# Patient Record
Sex: FEMALE | Race: WHITE | NOT HISPANIC OR LATINO | Employment: OTHER | URBAN - METROPOLITAN AREA
[De-identification: names, ages, dates, MRNs, and addresses within clinical notes are randomized per-mention and may not be internally consistent; named-entity substitution may affect disease eponyms.]

---

## 2017-01-03 ENCOUNTER — OFFICE VISIT (OUTPATIENT)
Dept: PEDIATRICS | Facility: CLINIC | Age: 67
End: 2017-01-03
Payer: COMMERCIAL

## 2017-01-03 VITALS
DIASTOLIC BLOOD PRESSURE: 74 MMHG | SYSTOLIC BLOOD PRESSURE: 118 MMHG | BODY MASS INDEX: 33.47 KG/M2 | HEIGHT: 62 IN | TEMPERATURE: 98.9 F | OXYGEN SATURATION: 97 % | HEART RATE: 65 BPM | WEIGHT: 181.9 LBS

## 2017-01-03 DIAGNOSIS — R53.83 FATIGUE, UNSPECIFIED TYPE: ICD-10-CM

## 2017-01-03 DIAGNOSIS — I10 HYPERTENSION GOAL BP (BLOOD PRESSURE) < 140/90: ICD-10-CM

## 2017-01-03 DIAGNOSIS — E04.1 THYROID NODULE: ICD-10-CM

## 2017-01-03 DIAGNOSIS — N18.30 CKD (CHRONIC KIDNEY DISEASE) STAGE 3, GFR 30-59 ML/MIN (H): ICD-10-CM

## 2017-01-03 DIAGNOSIS — F41.1 GENERALIZED ANXIETY DISORDER: ICD-10-CM

## 2017-01-03 DIAGNOSIS — R73.01 IFG (IMPAIRED FASTING GLUCOSE): ICD-10-CM

## 2017-01-03 DIAGNOSIS — E78.5 HYPERLIPIDEMIA LDL GOAL <100: ICD-10-CM

## 2017-01-03 DIAGNOSIS — E66.9 NON MORBID OBESITY, UNSPECIFIED OBESITY TYPE: ICD-10-CM

## 2017-01-03 DIAGNOSIS — Z00.00 ENCOUNTER FOR ROUTINE ADULT HEALTH EXAMINATION WITHOUT ABNORMAL FINDINGS: Primary | ICD-10-CM

## 2017-01-03 DIAGNOSIS — Z23 NEED FOR PROPHYLACTIC VACCINATION AND INOCULATION AGAINST INFLUENZA: ICD-10-CM

## 2017-01-03 PROCEDURE — 90471 IMMUNIZATION ADMIN: CPT | Performed by: PEDIATRICS

## 2017-01-03 PROCEDURE — 99396 PREV VISIT EST AGE 40-64: CPT | Mod: 25 | Performed by: PEDIATRICS

## 2017-01-03 PROCEDURE — 90686 IIV4 VACC NO PRSV 0.5 ML IM: CPT | Performed by: PEDIATRICS

## 2017-01-03 RX ORDER — PRAVASTATIN SODIUM 20 MG
20 TABLET ORAL DAILY
Qty: 90 TABLET | Refills: 3 | Status: SHIPPED
Start: 2017-01-03 | End: 2017-07-24

## 2017-01-03 RX ORDER — CITALOPRAM HYDROBROMIDE 40 MG/1
40 TABLET ORAL DAILY
Qty: 90 TABLET | Refills: 3 | Status: SHIPPED
Start: 2017-01-03 | End: 2017-07-24

## 2017-01-03 RX ORDER — CALCITRIOL 0.25 UG/1
0.25 CAPSULE, LIQUID FILLED ORAL DAILY
Qty: 90 CAPSULE | Refills: 3 | Status: SHIPPED
Start: 2017-01-03 | End: 2017-07-24

## 2017-01-03 NOTE — MR AVS SNAPSHOT
After Visit Summary   1/3/2017    Angela Story    MRN: [id number]           Patient Information     Date Of Birth          1950        Visit Information        Provider Department      1/3/2017 12:00 PM Tahira [place] MD [facility]        Today's Diagnoses     Encounter for routine adult health examination without abnormal findings    -  1     Hypertension goal BP (blood pressure) < 140/90         Generalized anxiety disorder         IFG (impaired fasting glucose)         CKD (chronic kidney disease) stage 3, GFR 30-59 ml/min         Hyperlipidemia LDL goal <100           Care Instructions    Continue current medications.     Ramp up your exercise as you can.    Plan on urine test at our next visit.    Keep an eye on fatigue and any lung symptoms - come to see me when you get home if things aren't better.  Otherwise, let's meet in June.       Flu shot today    Think about shingles shot.    Pneumonia shot at your next visit        Preventive Health Recommendations  Female Ages 50 - 64    Yearly exam: See your health care provider every year in order to  o Review health changes.   o Discuss preventive care.    o Review your medicines if your doctor has prescribed any.      Get a Pap test every three years (unless you have an abnormal result and your provider advises testing more often).    If you get Pap tests with HPV test, you only need to test every 5 years, unless you have an abnormal result.     You do not need a Pap test if your uterus was removed (hysterectomy) and you have not had cancer.    You should be tested each year for STDs (sexually transmitted diseases) if you're at risk.     Have a mammogram every 1 to 2 years.    Have a colonoscopy at age 50, or have a yearly FIT test (stool test). These exams screen for colon cancer.      Have a cholesterol test every 5 years, or more often if advised.    Have a diabetes test (fasting glucose) every three years. If you are at  risk for diabetes, you should have this test more often.     If you are at risk for osteoporosis (brittle bone disease), think about having a bone density scan (DEXA).    Shots: Get a flu shot each year. Get a tetanus shot every 10 years.    Nutrition:     Eat at least 5 servings of fruits and vegetables each day.    Eat whole-grain bread, whole-wheat pasta and brown rice instead of white grains and rice.    Talk to your provider about Calcium and Vitamin D.     Lifestyle    Exercise at least 150 minutes a week (30 minutes a day, 5 days a week). This will help you control your weight and prevent disease.    Limit alcohol to one drink per day.    No smoking.     Wear sunscreen to prevent skin cancer.     See your dentist every six months for an exam and cleaning.    See your eye doctor every 1 to 2 years.            Follow-ups after your visit        Future tests that were ordered for you today     Open Future Orders        Priority Expected Expires Ordered    Albumin Random Urine Quantitative Routine  1/3/2018 1/3/2017            Who to contact     If you have questions or need follow up information about today's clinic visit or your schedule please contact [facility] directly at [phone].  Normal or non-critical lab and imaging results will be communicated to you by NPRhart, letter or phone within 4 business days after the clinic has received the results. If you do not hear from us within 7 days, please contact the clinic through The Social Coin SLt or phone. If you have a critical or abnormal lab result, we will notify you by phone as soon as possible.  Submit refill requests through SecurActive or call your pharmacy and they will forward the refill request to us. Please allow 3 business days for your refill to be completed.          Additional Information About Your Visit        NPRharJooobz! Information     SecurActive gives you secure access to your electronic health record. If you see a primary care provider, you can  "also send messages to your care team and make appointments. If you have questions, please call your primary care clinic.  If you do not have a primary care provider, please call [phone] and they will assist you.        Care EveryWhere ID     This is your Care EveryWhere ID. This could be used by other organizations to access your [place] medical records  [id number]        Your Vitals Were     Pulse Temperature Height BMI (Body Mass Index) Pulse Oximetry Last Period    65 98.9  F (37.2  C) (Tympanic) 5' 1.61\" (1.565 m) 33.69 kg/m2 97% 07/23/2004       Blood Pressure from Last 3 Encounters:   01/03/17 118/74   12/07/16 126/80   11/21/16 150/76    Weight from Last 3 Encounters:   01/03/17 181 lb 14.4 oz (82.509 kg)   12/07/16 176 lb 2 oz (79.89 kg)   11/21/16 177 lb 4.8 oz (80.423 kg)                 Where to get your medicines      These medications [facility] sent to [facility] [place]  [address], [facility] [address]     Phone:  [phone]    - calcitRIOL 0.25 MCG capsule  - citalopram 40 MG tablet  - pravastatin 20 MG tablet       Primary Care Provider Office Phone # Fax #    Tahira [place] [phone]       [facility] [address] [place] [address]        Thank you!     Thank you for choosing [facility]  for your care. Our goal is always to provide you with excellent care. Hearing back from our patients is one way we can continue to improve our services. Please take a few minutes to complete the written survey that you may receive in the mail after your visit with us. Thank you!             Your Updated Medication List - Protect others around you: Learn how to safely use, store and throw away your medicines at www.disposemymeds.org.          This list is accurate as of: 1/3/17 12:40 PM.  Always use your most recent med list.                   Brand Name Dispense Instructions for use    Blood Pressure " Monitoring Kit     1 kit    1 kit daily       calcitRIOL 0.25 MCG capsule    ROCALTROL    90 capsule    Take 1 capsule (0.25 mcg) by mouth daily       cholecalciferol 1000 UNIT tablet    vitamin D    100 tablet    Take 1 tablet by mouth daily.       citalopram 40 MG tablet    celeXA    90 tablet    Take 1 tablet (40 mg) by mouth daily       fluticasone 50 MCG/ACT spray    FLONASE    1 Bottle    Spray 1 spray into both nostrils daily       losartan 50 MG tablet    COZAAR    90 tablet    Take 1 tablet (50 mg) by mouth daily       pravastatin 20 MG tablet    PRAVACHOL    90 tablet    Take 1 tablet (20 mg) by mouth daily

## 2017-01-03 NOTE — PATIENT INSTRUCTIONS
Continue current medications.     Ramp up your exercise as you can.    Plan on urine test at our next visit.    Keep an eye on fatigue and any lung symptoms - come to see me when you get home if things aren't better.  Otherwise, let's meet in June.       Flu shot today    Think about shingles shot.    Pneumonia shot at your next visit        Preventive Health Recommendations  Female Ages 50 - 64    Yearly exam: See your health care provider every year in order to  o Review health changes.   o Discuss preventive care.    o Review your medicines if your doctor has prescribed any.      Get a Pap test every three years (unless you have an abnormal result and your provider advises testing more often).    If you get Pap tests with HPV test, you only need to test every 5 years, unless you have an abnormal result.     You do not need a Pap test if your uterus was removed (hysterectomy) and you have not had cancer.    You should be tested each year for STDs (sexually transmitted diseases) if you're at risk.     Have a mammogram every 1 to 2 years.    Have a colonoscopy at age 50, or have a yearly FIT test (stool test). These exams screen for colon cancer.      Have a cholesterol test every 5 years, or more often if advised.    Have a diabetes test (fasting glucose) every three years. If you are at risk for diabetes, you should have this test more often.     If you are at risk for osteoporosis (brittle bone disease), think about having a bone density scan (DEXA).    Shots: Get a flu shot each year. Get a tetanus shot every 10 years.    Nutrition:     Eat at least 5 servings of fruits and vegetables each day.    Eat whole-grain bread, whole-wheat pasta and brown rice instead of white grains and rice.    Talk to your provider about Calcium and Vitamin D.     Lifestyle    Exercise at least 150 minutes a week (30 minutes a day, 5 days a week). This will help you control your weight and prevent disease.    Limit alcohol to one  drink per day.    No smoking.     Wear sunscreen to prevent skin cancer.     See your dentist every six months for an exam and cleaning.    See your eye doctor every 1 to 2 years.

## 2017-01-03 NOTE — NURSING NOTE
"Chief Complaint   Patient presents with     Physical       Initial /74 mmHg  Pulse 65  Temp(Src) 98.9  F (37.2  C) (Tympanic)  Ht 5' 1.61\" (1.565 m)  Wt 181 lb 14.4 oz (82.509 kg)  BMI 33.69 kg/m2  SpO2 97%  LMP 07/23/2004 Estimated body mass index is 33.69 kg/(m^2) as calculated from the following:    Height as of this encounter: 5' 1.61\" (1.565 m).    Weight as of this encounter: 181 lb 14.4 oz (82.509 kg).  BP completed using cuff size: large'  "

## 2017-01-03 NOTE — PROGRESS NOTES
SUBJECTIVE:     CC: Angela Story is an 66 year old woman who presents for preventive health visit.     Healthy Habits:    Do you get at least three servings of calcium containing foods daily (dairy, green leafy vegetables, etc.)? yes    Amount of exercise or daily activities, outside of work: none currently, recent foot surgery     Problems taking medications regularly No    Medication side effects: No    Have you had an eye exam in the past two years? yes    Do you see a dentist twice per year? Once a year   Do you have sleep apnea, excessive snoring or daytime drowsiness?no      - fatigue   - recent chest xray, review results    Today's PHQ-2 Score:   PHQ-2 ( 1999 Pfizer) 1/3/2017 12/31/2015   Q1: Little interest or pleasure in doing things 0 0   Q2: Feeling down, depressed or hopeless 0 0   PHQ-2 Score 0 0       Abuse: Current or Past(Physical, Sexual or Emotional)- No  Do you feel safe in your environment - Yes    Social History   Substance Use Topics     Smoking status: Never Smoker      Smokeless tobacco: Never Used     Alcohol Use: Yes      Comment: 1 per week     The patient does not drink >3 drinks per day nor >7 drinks per week.    Recent Labs   Lab Test  09/22/16   0952  12/31/15   0757  12/24/14   0717  04/08/14   0741   CHOL  152  211*  182  190   HDL  61  70  74  66   LDL  76  117*  83  98   TRIG  73  118  125  127   CHOLHDLRATIO   --    --   2.5  2.9   NHDL  91  141*   --    --        Reviewed orders with patient.  Reviewed health maintenance and updated orders accordingly - Yes    Mammo Decision Support:  Patient over age 50, mutual decision to screen reflected in health maintenance.    Pertinent mammograms are reviewed under the imaging tab.  History of abnormal Pap smear: NO - age 30-65 PAP every 5 years with negative HPV co-testing recommended  All Histories reviewed and updated in Epic.    HPI:    Recent right foot surgery - healing well - starting to be able walk and exercise more.  Plans to  "restart her exercise classes in near future.    Back to [place] on Sunday - plans to stay for 3 months - has exercise classes arranged.    Fatigue - currently living with friend who has widely metastatic cancer - emotionally exhausted.  Started to feel more tired after her foot surgery.  Also had a lung infection - reviewed Chest X ray results today - suggests emphesematous changes and hyperinflation.  Patient doesn't think she has every had issues with recurrent bronchitis or wheezing.  Has never been a smoker, but was exposed to a lot of second hand smoke as a child.   No current issues with cough.    Reviewed recent labs - has been doing well.   Medications stable.    HTN - well controlled on current medications without side effects.  No cardiac symptoms.    Anxiety - well controlled on SSRI without side effects.    HL - doing well on statin, no side effects.      ROS:  C: NEGATIVE for fever, chills, change in weight  I: NEGATIVE for worrisome rashes, moles or lesions  E: NEGATIVE for vision changes or irritation  ENT: NEGATIVE for ear, mouth and throat problems  R: NEGATIVE for significant cough or SOB  B: NEGATIVE for masses, tenderness or discharge  CV: NEGATIVE for chest pain, palpitations or peripheral edema  GI: NEGATIVE for nausea, abdominal pain, heartburn, or change in bowel habits  : NEGATIVE for unusual urinary or vaginal symptoms. No vaginal bleeding.  M: NEGATIVE for significant arthralgias or myalgia  N: NEGATIVE for weakness, dizziness or paresthesias  P: NEGATIVE for changes in mood or affect     Problem list, Medication list, Allergies, and Medical/Social/Surgical histories reviewed in [facility] and updated as appropriate.  OBJECTIVE:     /74 mmHg  Pulse 65  Temp(Src) 98.9  F (37.2  C) (Tympanic)  Ht 5' 1.61\" (1.565 m)  Wt 181 lb 14.4 oz (82.509 kg)  BMI 33.69 kg/m2  SpO2 97%  LMP 07/23/2004  EXAM:  GENERAL: healthy, alert and no distress  EYES: Eyes grossly normal to inspection, PERRL and " conjunctivae and sclerae normal  HENT: ear canals and TM's normal, nose and mouth without ulcers or lesions  NECK: no adenopathy, no asymmetry, masses, or scars and thyroid normal to palpation  RESP: lungs clear to auscultation - no rales, rhonchi or wheezes  CV: regular rate and rhythm, normal S1 S2, no S3 or S4, no murmur, click or rub, no peripheral edema and peripheral pulses strong  ABDOMEN: soft, nontender, no hepatosplenomegaly, no masses and bowel sounds normal  MS: no gross musculoskeletal defects noted, no edema  SKIN: no suspicious lesions or rashes  NEURO: Normal strength and tone, mentation intact and speech normal  PSYCH: mentation appears normal, affect normal/bright    ASSESSMENT/PLAN:         ICD-10-CM    1. Encounter for routine adult health examination without abnormal findings Z00.00 Reviewed recent lab work, plan on PTH, vitamin D with next lab draw   2. Hypertension goal BP (blood pressure) < 140/90 I10 Albumin Random Urine Quantitative  Well controlled, continue current medications     3. Generalized anxiety disorder F41.1 citalopram (CELEXA) 40 MG tablet  Well controlled, continue current medications     4. IFG (impaired fasting glucose) R73.01 Continue to follow over time    5. CKD (chronic kidney disease) stage 3, GFR 30-59 ml/min N18.3 calcitRIOL (ROCALTROL) 0.25 MCG capsule   6. Non morbid obesity, unspecified obesity type E66.9 Discussed lifestyle change   7. Fatigue, unspecified type R53.83 Likely multifactorial - caring for friend with advanced cancer, recent lung infection (now recovered), and recent surgery.  No sleep or mood concerns.  Plan [place] and she will MyChart message me or return if symptoms not improving over next few months.   8. Hyperlipidemia LDL goal <100 E78.5 pravastatin (PRAVACHOL) 20 MG tablet  Well controlled, continue current medications     9. Thyroid nodule E04.1 Recently imaged - tiny cyst noted   10. Need for  "prophylactic vaccination and inoculation against influenza Z23 FLU VAC, SPLIT VIRUS IM > 3 YO (QUADRIVALENT) [97946]     Vaccine Administration, Initial [83971]       COUNSELING:   Special attention given to:        Regular exercise       Healthy diet/nutrition       Vision screening       Vaccinated for: Influenza       Osteoporosis Prevention/Bone Health         reports that she has never smoked. She has never used smokeless tobacco.    Estimated body mass index is 33.69 kg/(m^2) as calculated from the following:    Height as of this encounter: 5' 1.61\" (1.565 m).    Weight as of this encounter: 181 lb 14.4 oz (82.509 kg).   Weight management plan: Discussed healthy diet and exercise guidelines and patient will follow up in 6 months in clinic to re-evaluate.    Counseling Resources:  ATP IV Guidelines  Pooled Cohorts Equation Calculator  Breast Cancer Risk Calculator  FRAX Risk Assessment  ICSI Preventive Guidelines  Dietary Guidelines for Americans, 2010  USDA's MyPlate  ASA Prophylaxis  Lung CA Screening    Tahira [place] MD  [facility]  "

## 2017-02-07 DIAGNOSIS — I10 HYPERTENSION GOAL BP (BLOOD PRESSURE) < 140/90: Primary | ICD-10-CM

## 2017-02-07 NOTE — TELEPHONE ENCOUNTER
Cozaar 50mg  Last Written Prescription Date: 12/9/16  Last Fill Quantity: 90, # refills: 3  Last Office Visit with [facility], [facility] or [facility] prescribing provider: 1/3/16       POTASSIUM   Date Value Ref Range Status   09/22/2016 4.2 3.4 - 5.3 mmol/L Final     CREATININE   Date Value Ref Range Status   09/22/2016 0.95 0.52 - 1.04 mg/dL Final     BP Readings from Last 3 Encounters:   01/03/17 118/74   12/07/16 126/80   11/21/16 150/76     *Patient is in [place] for a few months and forgot her medication at home. Pharmacy is requesting that a new script be sent to them to cover her vacation fill. Patient would like enough for 6 weeks.   Demetra Kiser,   [facility]

## 2017-02-10 RX ORDER — LOSARTAN POTASSIUM 50 MG/1
50 TABLET ORAL DAILY
Qty: 60 TABLET | Refills: 0 | Status: CANCELLED | OUTPATIENT
Start: 2017-02-10

## 2017-02-10 NOTE — TELEPHONE ENCOUNTER
Left voicemail for patient to call back the clinic. [facility], does she want mail order or a local pharmacy nearby? Will need to await response before sending.

## 2017-02-10 NOTE — TELEPHONE ENCOUNTER
Patient calling back, she called [facility], they figured it out and sent her the medication already. No further action needed.

## 2017-06-19 ENCOUNTER — MYC MEDICAL ADVICE (OUTPATIENT)
Dept: PEDIATRICS | Facility: CLINIC | Age: 67
End: 2017-06-19

## 2017-07-24 ENCOUNTER — OFFICE VISIT (OUTPATIENT)
Dept: PEDIATRICS | Facility: CLINIC | Age: 67
End: 2017-07-24
Payer: COMMERCIAL

## 2017-07-24 VITALS
BODY MASS INDEX: 33.68 KG/M2 | WEIGHT: 183 LBS | DIASTOLIC BLOOD PRESSURE: 78 MMHG | OXYGEN SATURATION: 97 % | SYSTOLIC BLOOD PRESSURE: 126 MMHG | HEIGHT: 62 IN | HEART RATE: 70 BPM | TEMPERATURE: 98.8 F

## 2017-07-24 DIAGNOSIS — N18.30 CKD (CHRONIC KIDNEY DISEASE) STAGE 3, GFR 30-59 ML/MIN (H): ICD-10-CM

## 2017-07-24 DIAGNOSIS — Z23 NEED FOR TETANUS BOOSTER: ICD-10-CM

## 2017-07-24 DIAGNOSIS — F41.1 GENERALIZED ANXIETY DISORDER: ICD-10-CM

## 2017-07-24 DIAGNOSIS — Z23 NEED FOR PROPHYLACTIC VACCINATION AGAINST STREPTOCOCCUS PNEUMONIAE (PNEUMOCOCCUS): ICD-10-CM

## 2017-07-24 DIAGNOSIS — E78.5 HYPERLIPIDEMIA LDL GOAL <100: ICD-10-CM

## 2017-07-24 DIAGNOSIS — I10 HYPERTENSION GOAL BP (BLOOD PRESSURE) < 140/90: Primary | ICD-10-CM

## 2017-07-24 LAB
ALBUMIN SERPL-MCNC: 4.1 G/DL (ref 3.4–5)
ALP SERPL-CCNC: 73 U/L (ref 40–150)
ALT SERPL W P-5'-P-CCNC: 25 U/L (ref 0–50)
ANION GAP SERPL CALCULATED.3IONS-SCNC: 4 MMOL/L (ref 3–14)
AST SERPL W P-5'-P-CCNC: 22 U/L (ref 0–45)
BILIRUB SERPL-MCNC: 0.7 MG/DL (ref 0.2–1.3)
BUN SERPL-MCNC: 13 MG/DL (ref 7–30)
CALCIUM SERPL-MCNC: 9.2 MG/DL (ref 8.5–10.1)
CHLORIDE SERPL-SCNC: 107 MMOL/L (ref 94–109)
CO2 SERPL-SCNC: 28 MMOL/L (ref 20–32)
CREAT SERPL-MCNC: 1.04 MG/DL (ref 0.52–1.04)
CREAT UR-MCNC: 28 MG/DL
GFR SERPL CREATININE-BSD FRML MDRD: 53 ML/MIN/1.7M2
GLUCOSE SERPL-MCNC: 108 MG/DL (ref 70–99)
MICROALBUMIN UR-MCNC: <5 MG/L
MICROALBUMIN/CREAT UR: NORMAL MG/G CR (ref 0–25)
POTASSIUM SERPL-SCNC: 4.6 MMOL/L (ref 3.4–5.3)
PROT SERPL-MCNC: 7.4 G/DL (ref 6.8–8.8)
SODIUM SERPL-SCNC: 139 MMOL/L (ref 133–144)

## 2017-07-24 PROCEDURE — 80053 COMPREHEN METABOLIC PANEL: CPT | Performed by: PEDIATRICS

## 2017-07-24 PROCEDURE — G0009 ADMIN PNEUMOCOCCAL VACCINE: HCPCS | Mod: 59 | Performed by: PEDIATRICS

## 2017-07-24 PROCEDURE — 90471 IMMUNIZATION ADMIN: CPT | Performed by: PEDIATRICS

## 2017-07-24 PROCEDURE — 90670 PCV13 VACCINE IM: CPT | Performed by: PEDIATRICS

## 2017-07-24 PROCEDURE — 99214 OFFICE O/P EST MOD 30 MIN: CPT | Mod: 25 | Performed by: PEDIATRICS

## 2017-07-24 PROCEDURE — 36415 COLL VENOUS BLD VENIPUNCTURE: CPT | Performed by: PEDIATRICS

## 2017-07-24 PROCEDURE — 90715 TDAP VACCINE 7 YRS/> IM: CPT | Performed by: PEDIATRICS

## 2017-07-24 PROCEDURE — 82043 UR ALBUMIN QUANTITATIVE: CPT | Performed by: PEDIATRICS

## 2017-07-24 RX ORDER — PRAVASTATIN SODIUM 20 MG
20 TABLET ORAL DAILY
Qty: 90 TABLET | Refills: 3 | Status: SHIPPED | OUTPATIENT
Start: 2017-07-24 | End: 2018-03-23

## 2017-07-24 RX ORDER — CITALOPRAM HYDROBROMIDE 40 MG/1
40 TABLET ORAL DAILY
Qty: 90 TABLET | Refills: 3 | Status: SHIPPED | OUTPATIENT
Start: 2017-07-24 | End: 2018-03-23

## 2017-07-24 RX ORDER — CALCITRIOL 0.25 UG/1
0.25 CAPSULE, LIQUID FILLED ORAL DAILY
Qty: 90 CAPSULE | Refills: 3 | Status: SHIPPED | OUTPATIENT
Start: 2017-07-24 | End: 2018-03-23

## 2017-07-24 RX ORDER — LOSARTAN POTASSIUM 50 MG/1
50 TABLET ORAL DAILY
Qty: 90 TABLET | Refills: 3 | Status: SHIPPED | OUTPATIENT
Start: 2017-07-24 | End: 2018-03-23

## 2017-07-24 NOTE — NURSING NOTE
"Chief Complaint   Patient presents with     Anxiety     Hypertension     Lipids     Recheck Medication       Initial /78 (BP Location: Right arm, Patient Position: Right side, Cuff Size: Adult Regular)  Pulse 70  Temp 98.8  F (37.1  C) (Tympanic)  Ht 5' 1.6\" (1.565 m)  Wt 183 lb (83 kg)  LMP 07/23/2004  SpO2 97%  BMI 33.91 kg/m2 Estimated body mass index is 33.91 kg/(m^2) as calculated from the following:    Height as of this encounter: 5' 1.6\" (1.565 m).    Weight as of this encounter: 183 lb (83 kg).  Medication Reconciliation: complete  "

## 2017-07-24 NOTE — MR AVS SNAPSHOT
After Visit Summary   7/24/2017    Angela Story    MRN: [id number]           Patient Information     Date Of Birth          1950        Visit Information        Provider Department      7/24/2017 10:20 AM Tahira [place] MD [facility]        Today's Diagnoses     Hypertension goal BP (blood pressure) < 140/90    -  1    Need for prophylactic vaccination against Streptococcus pneumoniae (pneumococcus)        CKD (chronic kidney disease) stage 3, GFR 30-59 ml/min        Generalized anxiety disorder        Hyperlipidemia LDL goal <100        Need for tetanus booster          Care Instructions    Tetanus (with whooping cough booster) and pneumonia vaccines today    Labs today on the first floor    Medication refills sent to [facility]    Keep up your great exercise!          Follow-ups after your visit        Who to contact     If you have questions or need follow up information about today's clinic visit or your schedule please contact [facility] directly at [phone].  Normal or non-critical lab and imaging results will be communicated to you by MyChart, letter or phone within 4 business days after the clinic has received the results. If you do not hear from us within 7 days, please contact the clinic through Streaming Erahart or phone. If you have a critical or abnormal lab result, we will notify you by phone as soon as possible.  Submit refill requests through AchieveIt Online or call your pharmacy and they will forward the refill request to us. Please allow 3 business days for your refill to be completed.          Additional Information About Your Visit        MyChart Information     AchieveIt Online gives you secure access to your electronic health record. If you see a primary care provider, you can also send messages to your care team and make appointments. If you have questions, please call your primary care clinic.  If you do not have a primary care provider, please call  "[phone] and they will assist you.        Care EveryWhere ID     This is your Care EveryWhere ID. This could be used by other organizations to access your [place] medical records  [id number]        Your Vitals Were     Pulse Temperature Height Last Period Pulse Oximetry BMI (Body Mass Index)    70 98.8  F (37.1  C) (Tympanic) 5' 1.6\" (1.565 m) 07/23/2004 97% 33.91 kg/m2       Blood Pressure from Last 3 Encounters:   07/24/17 126/78   01/03/17 118/74   12/07/16 126/80    Weight from Last 3 Encounters:   07/24/17 183 lb (83 kg)   01/03/17 181 lb 14.4 oz (82.5 kg)   12/07/16 176 lb 2 oz (79.9 kg)              We Performed the Following     ADMIN: Vaccine, Initial (62879)     Albumin Random Urine Quantitative     Comprehensive metabolic panel     Pneumococcal vaccine 13 valent PCV13 IM (Prevnar) [58284]     TDAP VACCINE (ADACEL)     VACCINE ADMINISTRATION, EACH ADDITIONAL          Where to get your medicines      These medications were sent to [facility] - [place], [place] - [address] AT [facility]  [address], [facility] [address]     Phone:  [phone]     calcitRIOL 0.25 MCG capsule    citalopram 40 MG tablet    losartan 50 MG tablet    pravastatin 20 MG tablet          Primary Care Provider Office Phone # Fax #    Tahira [place] [phone]       [facility] [address] [place] [address] [address] [place] [address]        Equal Access to Services     [facility]: Hadii johnson minor Sojerry, waaxda luqadaha, qaybta kaalmayandy dos santos . So [facility] [phone].    ATENCIÓN: Si habla español, tiene a stevens disposición servicios gratuitos de asistencia lingüística. Llame al [phone].    We comply with applicable federal civil rights laws and [place] laws. We do not discriminate on the basis of race, color, national origin, age, disability sex, sexual orientation or gender identity.            Thank you!  "    Thank you for choosing [facility]  for your care. Our goal is always to provide you with excellent care. Hearing back from our patients is one way we can continue to improve our services. Please take a few minutes to complete the written survey that you may receive in the mail after your visit with us. Thank you!             Your Updated Medication List - Protect others around you: Learn how to safely use, store and throw away your medicines at www.disposemymeds.org.          This list is accurate as of: 7/24/17 10:57 AM.  Always use your most recent med list.                   Brand Name Dispense Instructions for use Diagnosis    Blood Pressure Monitoring Kit     1 kit    1 kit daily    Hypertension goal BP (blood pressure) < 140/90       calcitRIOL 0.25 MCG capsule    ROCALTROL    90 capsule    Take 1 capsule (0.25 mcg) by mouth daily    CKD (chronic kidney disease) stage 3, GFR 30-59 ml/min       cholecalciferol 1000 UNIT tablet    vitamin D    100 tablet    Take 1 tablet by mouth daily.    Routine general medical examination at a health care facility       citalopram 40 MG tablet    celeXA    90 tablet    Take 1 tablet (40 mg) by mouth daily    Generalized anxiety disorder       fluticasone 50 MCG/ACT spray    FLONASE    1 Bottle    Spray 1 spray into both nostrils daily    Cough       losartan 50 MG tablet    COZAAR    90 tablet    Take 1 tablet (50 mg) by mouth daily    Hypertension goal BP (blood pressure) < 140/90       pravastatin 20 MG tablet    PRAVACHOL    90 tablet    Take 1 tablet (20 mg) by mouth daily    Hyperlipidemia LDL goal <100

## 2017-07-24 NOTE — PROGRESS NOTES
SUBJECTIVE:                                                    Angela Story is a 67 year old female who presents to clinic today for the following health issues:      Hyperlipidemia Follow-Up      Rate your low fat/cholesterol diet?: fair    Taking statin?  Yes, no muscle aches from statin    Other lipid medications/supplements?:  none    Hypertension Follow-up      Outpatient blood pressures are being checked at home.  Results are 130/80's.    Low Salt Diet: low salt    Anxiety Follow-Up    Status since last visit: stable    Other associated symptoms:None    Complicating factors:   Significant life event: No   Current substance abuse: None  Depression symptoms: No  CICI-7 SCORE 6/23/2014 6/26/2014 7/7/2014   Total Score 12 11 12       GAD7          [place].  Grandson recently born - now 5 weeks old.    Has increased exercising.  Weight stable.    Fatigue from last year has resolved - believes it was related to a combination of life events.    Anxiety - well controlled.  No side effects on medications.  Exercise helps with mood too.    Foot feeling good after surgery last year.  Ambulation not limited.    Due for urine protein check for CKD.    No new cardiac symptoms - denies chest pain, palpitations, LE edema, breathing changes.          Amount of exercise or physical activity: 5-6 days for 1-2 hours daily     Problems taking medications regularly: No    Medication side effects: none  Diet: low salt and low fat/cholesterol      Problem list and histories reviewed & adjusted, as indicated.  Additional history: as documented      Reviewed and updated as needed this visit by clinical staff     Reviewed and updated as needed this visit by Provider         ROS:  Constitutional, psych, cardiovascular, pulmonary, derm, msk systems are negative, except as otherwise noted.      OBJECTIVE:   /78 (BP Location: Right arm, Patient Position: Right side, Cuff Size:  "Adult Regular)  Pulse 70  Temp 98.8  F (37.1  C) (Tympanic)  Ht 5' 1.6\" (1.565 m)  Wt 183 lb (83 kg)  LMP 07/23/2004  SpO2 97%  BMI 33.91 kg/m2  Body mass index is 33.91 kg/(m^2).  GENERAL: healthy, alert and no distress  RESP: lungs clear to auscultation - no rales, rhonchi or wheezes  CV: regular rate and rhythm, normal S1 S2, no S3 or S4, no murmur, click or rub, no peripheral edema and peripheral pulses strong  PSYCH: mentation appears normal, affect normal/bright    Diagnostic Test Results:  pending    ASSESSMENT/PLAN:       ICD-10-CM    1. Hypertension goal BP (blood pressure) < 140/90 I10 Albumin Random Urine Quantitative     losartan (COZAAR) 50 MG tablet     Comprehensive metabolic panel  Well controlled, continue current medications.  Encouraged in lifestyle changes - dietary changes and exercise.     2. CKD (chronic kidney disease) stage 3, GFR 30-59 ml/min N18.3 calcitRIOL (ROCALTROL) 0.25 MCG capsule  Recheck lab work today, including microalbumin   3. Generalized anxiety disorder F41.1 citalopram (CELEXA) 40 MG tablet  Well controlled, continue current medications     4. Hyperlipidemia LDL goal <100 E78.5 pravastatin (PRAVACHOL) 20 MG tablet  Well controlled, continue current medications     5. Need for tetanus booster Z23 TDAP VACCINE (ADACEL)     VACCINE ADMINISTRATION, EACH ADDITIONAL   6. Need for prophylactic vaccination against Streptococcus pneumoniae (pneumococcus) Z23 Pneumococcal vaccine 13 valent PCV13 IM (Prevnar) [33603]     ADMIN: Vaccine, Initial (57731)       Follow up for physical in January.    Tahira Wallis MD  [facility]    "

## 2017-07-24 NOTE — NURSING NOTE
Screening Questionnaire for Adult Immunization    Are you sick today?   No   Do you have allergies to medications, food, a vaccine component or latex?   Yes   Have you ever had a serious reaction after receiving a vaccination?   No   Do you have a long-term health problem with heart disease, lung disease, asthma, kidney disease, metabolic disease (e.g. diabetes), anemia, or other blood disorder?   No   Do you have cancer, leukemia, HIV/AIDS, or any other immune system problem?   No   In the past 3 months, have you taken medications that affect  your immune system, such as prednisone, other steroids, or anticancer drugs; drugs for the treatment of rheumatoid arthritis, Crohn s disease, or psoriasis; or have you had radiation treatments?   No   Have you had a seizure, or a brain or other nervous system problem?   No   During the past year, have you received a transfusion of blood or blood     products, or been given immune (gamma) globulin or antiviral drug?   No   For women: Are you pregnant or is there a chance you could become        pregnant during the next month?   No   Have you received any vaccinations in the past 4 weeks?   No     Immunization questionnaire was positive for at least one answer.  Notified provider.      MNVFC doesn't apply on this patient    Per orders of Dr. Wallis, injection of PCV13 and TDAP given by Danae Rodgers. Patient instructed to remain in clinic for 15 minutes afterwards, and to report any adverse reaction to me immediately.       Screening performed by Danae Rodgers on 7/24/2017 at 11:14 AM.

## 2017-07-24 NOTE — PATIENT INSTRUCTIONS
Tetanus (with whooping cough booster) and pneumonia vaccines today    Labs today on the first floor    [facility]    Keep up your great exercise!

## 2017-10-04 ENCOUNTER — OFFICE VISIT (OUTPATIENT)
Dept: PEDIATRICS | Facility: CLINIC | Age: 67
End: 2017-10-04
Payer: COMMERCIAL

## 2017-10-04 VITALS
DIASTOLIC BLOOD PRESSURE: 84 MMHG | OXYGEN SATURATION: 97 % | TEMPERATURE: 98.2 F | SYSTOLIC BLOOD PRESSURE: 136 MMHG | HEART RATE: 74 BPM | WEIGHT: 190 LBS | HEIGHT: 62 IN | BODY MASS INDEX: 34.96 KG/M2

## 2017-10-04 DIAGNOSIS — Z23 NEED FOR PROPHYLACTIC VACCINATION AND INOCULATION AGAINST INFLUENZA: ICD-10-CM

## 2017-10-04 DIAGNOSIS — R05.9 COUGH: Primary | ICD-10-CM

## 2017-10-04 PROCEDURE — 90662 IIV NO PRSV INCREASED AG IM: CPT | Performed by: INTERNAL MEDICINE

## 2017-10-04 PROCEDURE — 90471 IMMUNIZATION ADMIN: CPT | Performed by: INTERNAL MEDICINE

## 2017-10-04 PROCEDURE — 99213 OFFICE O/P EST LOW 20 MIN: CPT | Mod: GC | Performed by: INTERNAL MEDICINE

## 2017-10-04 RX ORDER — FLUTICASONE PROPIONATE 50 MCG
1-2 SPRAY, SUSPENSION (ML) NASAL DAILY
Qty: 1 BOTTLE | Refills: 1 | Status: SHIPPED | OUTPATIENT
Start: 2017-10-04 | End: 2019-01-21

## 2017-10-04 NOTE — PROGRESS NOTES
Injectable Influenza Immunization Documentation    1.  Is the person to be vaccinated sick today?   No    2. Does the person to be vaccinated have an allergy to a component   of the vaccine?   No - but sensitivity to eggs    3. Has the person to be vaccinated ever had a serious reaction   to influenza vaccine in the past?   No    4. Has the person to be vaccinated ever had Guillain-Barré syndrome?   No    Form completed by pt/Neda Gloria MA 10/4/2017 1:57 PM

## 2017-10-04 NOTE — PATIENT INSTRUCTIONS
- start flonase 2 sprays in each nostril daily for 7 days  - if your symptoms are better in 7 days, change to 1 spray in each nostril for 23 more days (1 full month)  - take 20 mg of omeprazole daily for 2 months  - see Dr. Wallis if your symptoms are not better in 2 weeks  - come back sooner if your symptoms acutely worsen (new chest pain, difficulty breathing, new fevers...)  Cough, Chronic, Uncertain Cause (Adult)    Everyone has had a cough as part of the common cold, flu, or bronchitis. This kind of cough occurs along with an achy feeling, low-grade fever, nasal and sinus congestion, and a scratchy or sore throat. This usually gets better in 2 to 3 weeks. A cough that lasts longer than 3 weeks may be due to other causes.  If your cough does not improve over the next 2 weeks, further testing may be needed. Follow up with your healthcare provider as advised. Cough suppressants may be recommended. Based on your exam today, the exact cause of your cough is not certain. Below are some common causes for persistent cough.  Smokers cough  Smoker s cough doesn t go away. If you continue to smoke, it only gets worse. The cough is from irritation in the air passages. Talk to your healthcare provider about quitting. Medicines or nicotine-replacement products, like gum or the patch, may make quitting easier.  Postnasal drip  A cough that is worse at night may be due to postnasal drip. Excess mucus in the nose drains from the back of your nose to your throat. This triggers the cough reflex. Postnasal drip may be due to a sinus infection or allergy. Common allergens include dust, tobacco smoke (both inhaled and secondhand smoke), environmental pollutants, pollen, mold, pets, cleaning agents, room deodorizers, and chemical fumes. Over-the-counter antihistamines or decongestants may be helpful for allergies. A sinus infection may requires antibiotic treatment. See your healthcare provider if symptoms  continue.  Medicines  Certain prescribed medicines can cause a chronic cough in some people:    ACE inhibitors for high blood pressure. These include benazepril, captopril, enalapril, fosinopril, lisinopril, quinapril, ramipril, and others.    Beta-blockers for high blood pressure and other conditions. These include propranolol, atenolol, metoprolol, nadolol, and others.  Let your healthcare provider know if you are taking any of these.  Asthma  Cough may be the only sign of mild asthma. You may have tests to find out if asthma is causing your cough. You may also take asthma medicine on a trial basis.  Acid reflux (heartburn, GERD)  The esophagus is the tube that carries food from the mouth to the stomach. A valve at its lower end prevents stomach acids from flowing upward. If this valve does not work properly, acid from the stomach enters the esophagus. This may cause a burning pain in the upper abdomen or lower chest, belching, or cough. Symptoms are often worse when lying flat. Avoid eating or drinking before bedtime. Try using extra pillows to raise your upper body, or place 4-inch blocks under the head of your bed. You may try an over-the-counter antacid or an acid-blocking medicine such as famotidine, cimetidine, ranitidine, esomeprazole, lansoprazole, or omeprazole. Stronger medicines for this condition can be prescribed by your healthcare provider.  Follow-up care  Follow up with your healthcare provider, or as advised, if your cough does not improve. Further testing may be needed.  Note: If an X-ray was taken, a specialist will review it. You will be notified of any new findings that may affect your care.  When to seek medical advice  Call your healthcare provider right away if any of these occur:    Mild wheezing or difficulty breathing    Fever of 100.4 F (38 C) or higher, or as directed by your healthcare provider    Unexpected weight loss    Coughing up large amounts of colored sputum    Night sweats  (sheets and pajamas get soaking wet)  Call 911, or get immediate medical care  Contact emergency services right away if any of these occur:    Coughing up blood    Moderate to severe trouble breathing or wheezing  Date Last Reviewed: 9/13/2015 2000-2017 [employer]. [address] [place] [address], [place] [address]. All rights reserved. This information is not intended as a substitute for professional medical care. Always follow your healthcare professional's instructions.

## 2017-10-04 NOTE — PROGRESS NOTES
"  SUBJECTIVE:   Angela Story is a 67 year old female who presents to clinic today for the following health issues:    RESPIRATORY SYMPTOMS    Patient presents for evaluation of cough. She has had a cough productive of green then clear sputum, mostly early AM, for the last 2-3 weeks. Phlegm is better throughout the day, mostly gone by afternoon. She denies worsening cough at night, but does wake \"around 0400\" sometimes with a cough. She denies cold like symptoms before this and even currently. She occasionally has a sensation of needing to clear her throat and possible \"throat tightening\" without any swallowing difficulty or respiratory distress associated with these events. She had one episode of dyspnea with exertion recently, but attributes this to \"walking with a friend who walks fast\" after \"about 3/4 of a mile.\" She denies dyspnea at rest or with ADLs or when \"pushing a shopping cart around.\" She thinks she had a wheeze the other day, but denies this currently. Recent travel includes time split between here and [facility] She returned from [place] recently, but had symptoms while in [place]. No known sick contacts. No known allergies, but + post nasal drip. Had some GERD like symptoms recently. Taking prilosec the last few days with some improvement overall in her symptoms. Symptoms are not worsening. She denies fevers, chills, itchy/watery eyes, otalgia, rhinorrhea, congestions, tooth pain sore throat, chest pain, abd pain, N/V/D, changes in urination, constipation, new rashes, edema. She is a non smoker. No hx of asthma.    Problem list and histories reviewed & adjusted, as indicated.  Additional history: as documented    Patient Active Problem List   Diagnosis     HYPERLIPIDEMIA LDL GOAL <100     CKD (chronic kidney disease) stage 3, GFR 30-59 ml/min     Advanced directives, counseling/discussion     Hypertension goal BP (blood pressure) < 140/90     Esophageal reflux     Right shoulder pain     Osteoarthritis of " acromioclavicular joint     Bilateral bunions     Generalized anxiety disorder     Thyroid nodule     IFG (impaired fasting glucose)     Non morbid obesity, unspecified obesity type     Past Surgical History:   Procedure Laterality Date     BUNIONECTOMY  2014    Procedure: BUNIONECTOMY;  Surgeon: Kevin Rodas DPM;  Location: [facility] OR     C  DELIVERY ONLY      , Low Cervical     C NONSPECIFIC PROCEDURE      lumpectomy, lt     C NONSPECIFIC PROCEDURE      ob      C NONSPECIFIC PROCEDURE      Vaginal delivery x 1     EXCISE MASS BACK  2014    Procedure: EXCISE MASS BACK;  Surgeon: Chaim Lacy MD;  Location: [facility] OR     HC ARTHROTOMY W/OPEN MENISCUS REPAIR      lt knee     HC DILATION/CURETTAGE DIAG/THER NON OB      D & C     TONSILLECTOMY      tonsillectomy       Social History   Substance Use Topics     Smoking status: Never Smoker     Smokeless tobacco: Never Used     Alcohol use Yes      Comment: 1 per week     Family History   Problem Relation Age of Onset     Lipids Mother      Hypertension Mother      CANCER Father      pancreatic     Alcohol/Drug Father      etoh     HEART DISEASE Maternal Grandfather      CEREBROVASCULAR DISEASE Paternal Grandfather      Psychotic Disorder Sister      bi polar     Neurologic Disorder Sister      ms     Neurologic Disorder Sister      cidp         Current Outpatient Prescriptions   Medication Sig Dispense Refill     OMEPRAZOLE PO Take 20 mg by mouth every morning       citalopram (CELEXA) 40 MG tablet Take 1 tablet (40 mg) by mouth daily 90 tablet 3     pravastatin (PRAVACHOL) 20 MG tablet Take 1 tablet (20 mg) by mouth daily 90 tablet 3     calcitRIOL (ROCALTROL) 0.25 MCG capsule Take 1 capsule (0.25 mcg) by mouth daily 90 capsule 3     losartan (COZAAR) 50 MG tablet Take 1 tablet (50 mg) by mouth daily 90 tablet 3     fluticasone (FLONASE) 50 MCG/ACT spray Spray 1 spray into both nostrils daily 1 Bottle 11      "cholecalciferol (VITAMIN D) 1000 UNIT tablet Take 1 tablet by mouth daily. 100 tablet 12     Blood Pressure Monitoring KIT 1 kit daily 1 kit 0     Allergies   Allergen Reactions     Norco [Hydrocodone-Acetaminophen] Nausea and Vomiting     Dizzy     Eggs Nausea and Vomiting and Diarrhea     Lisinopril Cough     Mucinex Hives     Penicillins Rash     Sulfa Drugs Hives     Sulfites Nausea and Vomiting and Diarrhea     BP Readings from Last 3 Encounters:   10/04/17 136/84   07/24/17 126/78   01/03/17 118/74    Wt Readings from Last 3 Encounters:   10/04/17 190 lb (86.2 kg)   07/24/17 183 lb (83 kg)   01/03/17 181 lb 14.4 oz (82.5 kg)         Labs reviewed in EPIC    ROS:  10 point ROS completed and were negative aside from the pertinent findings noted in the HPI.    OBJECTIVE:     /84 (BP Location: Right arm, Cuff Size: Adult Large)  Pulse 74  Temp 98.2  F (36.8  C) (Oral)  Ht 5' 1.5\" (1.562 m)  Wt 190 lb (86.2 kg)  LMP 07/23/2004  SpO2 97%  BMI 35.32 kg/m2  Body mass index is 35.32 kg/(m^2).  GENERAL: healthy, alert and no distress  EYES: Eyes grossly normal to inspection, PERRL and conjunctivae and sclerae normal  HENT: scant clear fluid behind the TM on the left without any bulging or erythema, right TM normal, mild OP erythema, tonsils normal, no obvious cobblestoning  NECK: supple, thyroid normal to palpation, no adenopathy  RESP: lungs clear to auscultation - no rales, rhonchi or wheezes  CV: regular rate and rhythm, normal S1 S2, no S3 or S4, no murmur, click or rub, no peripheral edema and peripheral pulses strong  ABDOMEN: bowel sounds normal  MS: no gross musculoskeletal defects noted  NEURO: Normal strength and tone, mentation intact and speech normal  PSYCH: mentation appears normal, affect normal/bright    ASSESSMENT/PLAN:     (R05) Cough  (primary encounter diagnosis)  Differential includes post nasal drip vs allergies vs GERD vs infectious vs less likely asthma. No fevers, hypoxia or focal " lung findings to suggest PNA. Symptoms are better, including productive cough, as day progresses, thus less likely bronchitis. There was also no preceding URI like symptoms. No sinusitis symptoms. No edema or lung findings to suggest CHF. Given symptoms are worse in AM, possible there is post nasal drip +/- GERD, causing her ongoing symptoms. She is otherwise non toxic appearing, afebrile with stable vitals and feeling well.  Plan:  - flonase x 1 month  - prilosec x 2 months    (Z23) Need for prophylactic vaccination and inoculation against influenza  Plan: FLU VACCINE, INCREASED ANTIGEN, PRESV FREE, AGE        65+ [73208], Vaccine Administration, Initial         [[id number]]    Symptoms to seek immediate medical care reviewed with patient  For additional instructions, see AVS   Patient is traveling to [place] again in 2 weeks, instructed to follow up before then if symptoms are not improving  RTC sooner if symptoms acutely worsen    Pt seen and d/w attending physician, Dr. Wallis, who agrees with plan     Kp Whyte MD  PGY3 Med [facility]3] [place]    I have seen the patient, discussed with the resident and agree with the history, physical and plan as documented above.    Tahira Wallis MD  Internal Medicine - Pediatrics

## 2017-10-04 NOTE — MR AVS SNAPSHOT
After Visit Summary   10/4/2017    Angela Story    MRN: [id number]           Patient Information     Date Of Birth          1950        Visit Information        Provider Department      10/4/2017 1:15 PM Valeriy Whyte MD [facility]        Today's Diagnoses     Cough    -  1      Care Instructions      - start flonase 2 sprays in each nostril daily for 7 days  - if your symptoms are better in 7 days, change to 1 spray in each nostril for 23 more days (1 full month)  - take 20 mg of omeprazole daily for 2 months  - see Dr. Wallis if your symptoms are not better in 2 weeks  - come back sooner if your symptoms acutely worsen (new chest pain, difficulty breathing, new fevers...)  Cough, Chronic, Uncertain Cause (Adult)    Everyone has had a cough as part of the common cold, flu, or bronchitis. This kind of cough occurs along with an achy feeling, low-grade fever, nasal and sinus congestion, and a scratchy or sore throat. This usually gets better in 2 to 3 weeks. A cough that lasts longer than 3 weeks may be due to other causes.  If your cough does not improve over the next 2 weeks, further testing may be needed. Follow up with your healthcare provider as advised. Cough suppressants may be recommended. Based on your exam today, the exact cause of your cough is not certain. Below are some common causes for persistent cough.  Smokers cough  Smoker s cough doesn t go away. If you continue to smoke, it only gets worse. The cough is from irritation in the air passages. Talk to your healthcare provider about quitting. Medicines or nicotine-replacement products, like gum or the patch, may make quitting easier.  Postnasal drip  A cough that is worse at night may be due to postnasal drip. Excess mucus in the nose drains from the back of your nose to your throat. This triggers the cough reflex. Postnasal drip may be due to a sinus infection or allergy. Common allergens include dust, tobacco smoke  (both inhaled and secondhand smoke), environmental pollutants, pollen, mold, pets, cleaning agents, room deodorizers, and chemical fumes. Over-the-counter antihistamines or decongestants may be helpful for allergies. A sinus infection may requires antibiotic treatment. See your healthcare provider if symptoms continue.  Medicines  Certain prescribed medicines can cause a chronic cough in some people:    ACE inhibitors for high blood pressure. These include benazepril, captopril, enalapril, fosinopril, lisinopril, quinapril, ramipril, and others.    Beta-blockers for high blood pressure and other conditions. These include propranolol, atenolol, metoprolol, nadolol, and others.  Let your healthcare provider know if you are taking any of these.  Asthma  Cough may be the only sign of mild asthma. You may have tests to find out if asthma is causing your cough. You may also take asthma medicine on a trial basis.  Acid reflux (heartburn, GERD)  The esophagus is the tube that carries food from the mouth to the stomach. A valve at its lower end prevents stomach acids from flowing upward. If this valve does not work properly, acid from the stomach enters the esophagus. This may cause a burning pain in the upper abdomen or lower chest, belching, or cough. Symptoms are often worse when lying flat. Avoid eating or drinking before bedtime. Try using extra pillows to raise your upper body, or place 4-inch blocks under the head of your bed. You may try an over-the-counter antacid or an acid-blocking medicine such as famotidine, cimetidine, ranitidine, esomeprazole, lansoprazole, or omeprazole. Stronger medicines for this condition can be prescribed by your healthcare provider.  Follow-up care  Follow up with your healthcare provider, or as advised, if your cough does not improve. Further testing may be needed.  Note: If an X-ray was taken, a specialist will review it. You will be notified of any new findings that may affect your  care.  When to seek medical advice  Call your healthcare provider right away if any of these occur:    Mild wheezing or difficulty breathing    Fever of 100.4 F (38 C) or higher, or as directed by your healthcare provider    Unexpected weight loss    Coughing up large amounts of colored sputum    Night sweats (sheets and pajamas get soaking wet)  Call 911, or get immediate medical care  Contact emergency services right away if any of these occur:    Coughing up blood    Moderate to severe trouble breathing or wheezing  Date Last Reviewed: 9/13/2015 2000-2017 [employer]. [address] [place] [address]. All rights reserved. This information is not intended as a substitute for professional medical care. Always follow your healthcare professional's instructions.                Follow-ups after your visit        Who to contact     If you have questions or need follow up information about today's clinic visit or your schedule please contact [facility] directly at [phone].  Normal or non-critical lab and imaging results will be communicated to you by [employer]hart, letter or phone within 4 business days after the clinic has received the results. If you do not hear from us within 7 days, please contact the clinic through [employer] or phone. If you have a critical or abnormal lab result, we will notify you by phone as soon as possible.  Submit refill requests through [employer] or call your pharmacy and they will forward the refill request to us. Please allow 3 business days for your refill to be completed.          Additional Information About Your Visit        [employer] Information     [employer] gives you secure access to your electronic health record. If you see a primary care provider, you can also send messages to your care team and make appointments. If you have questions, please call your primary care clinic.  If you do not have a primary care provider, please call [phone] and they  "will assist you.        Care EveryWhere ID     This is your Care EveryWhere ID. This could be used by other organizations to access your [facility] medical records  [id number]        Your Vitals Were     Pulse Temperature Height Last Period Pulse Oximetry BMI (Body Mass Index)    74 98.2  F (36.8  C) (Oral) 5' 1.5\" (1.562 m) 07/23/2004 97% 35.32 kg/m2       Blood Pressure from Last 3 Encounters:   10/04/17 136/84   07/24/17 126/78   01/03/17 118/74    Weight from Last 3 Encounters:   10/04/17 190 lb (86.2 kg)   07/24/17 183 lb (83 kg)   01/03/17 181 lb 14.4 oz (82.5 kg)              Today, you had the following     No orders found for display         Today's Medication Changes          These changes are accurate as of: 10/4/17  1:52 PM.  If you have any questions, ask your nurse or doctor.               These medicines have changed or have updated prescriptions.        Dose/Directions    * fluticasone 50 MCG/ACT spray   Commonly known as:  FLONASE   This may have changed:  Another medication with the same name was added. Make sure you understand how and when to take each.   Used for:  Cough   Changed by:  Helena Price MD        Dose:  1 spray   Spray 1 spray into both nostrils daily   Quantity:  1 Bottle   Refills:  11       * fluticasone 50 MCG/ACT spray   Commonly known as:  FLONASE   This may have changed:  You were already taking a medication with the same name, and this prescription was added. Make sure you understand how and when to take each.   Used for:  Cough   Changed by:  Valeriy Whyte MD        Dose:  1-2 spray   Spray 1-2 sprays into both nostrils daily   Quantity:  1 Bottle   Refills:  1       * OMEPRAZOLE PO   This may have changed:  Another medication with the same name was added. Make sure you understand how and when to take each.   Changed by:  Valeriy Whyte MD        Dose:  20 mg   Take 20 mg by mouth every morning   Refills:  0       * omeprazole 20 MG CR capsule   Commonly " known as:  priLOSEC   This may have changed:  You were already taking a medication with the same name, and this prescription was added. Make sure you understand how and when to take each.   Used for:  Cough   Changed by:  Valeriy Whyte MD        Dose:  20 mg   Take 1 capsule (20 mg) by mouth daily   Quantity:  30 capsule   Refills:  1       * Notice:  This list has 4 medication(s) that are the same as other medications prescribed for you. Read the directions carefully, and ask your doctor or other care provider to review them with you.         Where to get your medicines      These medications were sent to [id number] - [place] - [address]  AT [address] [facility]  [address] [place] [address]     Phone:  [phone]     fluticasone 50 MCG/ACT spray    omeprazole 20 MG CR capsule                Primary Care Provider Office Phone # Fax #    Tahira Wallis [phone] [address] [place] [address]        Equal Access to Services     [facility]: Hadii aad ku hadasho Soomaali, waaxda luqadaha, qaybta kaalmada adeegyada, waxay idiin haykallin sharlene boss . So [facility] [phone].    ATENCIÓN: Si habla español, tiene a stevens disposición servicios gratuitos de asistencia lingüística. Llame al [phone].    We comply with applicable federal civil rights laws and [place] laws. We do not discriminate on the basis of race, color, national origin, age, disability, sex, sexual orientation, or gender identity.            Thank you!     Thank you for choosing [facility]  for your care. Our goal is always to provide you with excellent care. Hearing back from our patients is one way we can continue to improve our services. Please take a few minutes to complete the written survey that you may receive in the mail after your visit with us. Thank you!             Your Updated Medication List - Protect others around you:  Learn how to safely use, store and throw away your medicines at www.disposemymeds.org.          This list is accurate as of: 10/4/17  1:52 PM.  Always use your most recent med list.                   Brand Name Dispense Instructions for use Diagnosis    Blood Pressure Monitoring Kit     1 kit    1 kit daily    Hypertension goal BP (blood pressure) < 140/90       calcitRIOL 0.25 MCG capsule    ROCALTROL    90 capsule    Take 1 capsule (0.25 mcg) by mouth daily    CKD (chronic kidney disease) stage 3, GFR 30-59 ml/min       cholecalciferol 1000 UNIT tablet    vitamin D    100 tablet    Take 1 tablet by mouth daily.    Routine general medical examination at a health care facility       citalopram 40 MG tablet    celeXA    90 tablet    Take 1 tablet (40 mg) by mouth daily    Generalized anxiety disorder       * fluticasone 50 MCG/ACT spray    FLONASE    1 Bottle    Spray 1 spray into both nostrils daily    Cough       * fluticasone 50 MCG/ACT spray    FLONASE    1 Bottle    Spray 1-2 sprays into both nostrils daily    Cough       losartan 50 MG tablet    COZAAR    90 tablet    Take 1 tablet (50 mg) by mouth daily    Hypertension goal BP (blood pressure) < 140/90       * OMEPRAZOLE PO      Take 20 mg by mouth every morning        * omeprazole 20 MG CR capsule    priLOSEC    30 capsule    Take 1 capsule (20 mg) by mouth daily    Cough       pravastatin 20 MG tablet    PRAVACHOL    90 tablet    Take 1 tablet (20 mg) by mouth daily    Hyperlipidemia LDL goal <100       * Notice:  This list has 4 medication(s) that are the same as other medications prescribed for you. Read the directions carefully, and ask your doctor or other care provider to review them with you.

## 2017-10-04 NOTE — NURSING NOTE
"Chief Complaint   Patient presents with     URI       Initial /84 (BP Location: Right arm, Cuff Size: Adult Large)  Pulse 74  Temp 98.2  F (36.8  C) (Oral)  Ht 5' 1.5\" (1.562 m)  Wt 190 lb (86.2 kg)  LMP 07/23/2004  SpO2 97%  BMI 35.32 kg/m2 Estimated body mass index is 35.32 kg/(m^2) as calculated from the following:    Height as of this encounter: 5' 1.5\" (1.562 m).    Weight as of this encounter: 190 lb (86.2 kg).  Medication Reconciliation: complete     Vira Davidson MA   October 4, 2017,  1:21 PM    "

## 2018-03-23 ENCOUNTER — OFFICE VISIT (OUTPATIENT)
Dept: PEDIATRICS | Facility: CLINIC | Age: 68
End: 2018-03-23
Payer: COMMERCIAL

## 2018-03-23 VITALS
HEIGHT: 62 IN | OXYGEN SATURATION: 97 % | TEMPERATURE: 98.5 F | DIASTOLIC BLOOD PRESSURE: 70 MMHG | WEIGHT: 191.3 LBS | SYSTOLIC BLOOD PRESSURE: 126 MMHG | HEART RATE: 67 BPM | BODY MASS INDEX: 35.2 KG/M2

## 2018-03-23 DIAGNOSIS — F41.1 GENERALIZED ANXIETY DISORDER: ICD-10-CM

## 2018-03-23 DIAGNOSIS — Z12.11 SCREEN FOR COLON CANCER: ICD-10-CM

## 2018-03-23 DIAGNOSIS — R49.0 HOARSENESS: ICD-10-CM

## 2018-03-23 DIAGNOSIS — N18.30 CKD (CHRONIC KIDNEY DISEASE) STAGE 3, GFR 30-59 ML/MIN (H): ICD-10-CM

## 2018-03-23 DIAGNOSIS — E78.5 HYPERLIPIDEMIA LDL GOAL <100: ICD-10-CM

## 2018-03-23 DIAGNOSIS — Z00.00 HEALTHCARE MAINTENANCE: Primary | ICD-10-CM

## 2018-03-23 DIAGNOSIS — I10 HYPERTENSION GOAL BP (BLOOD PRESSURE) < 140/90: ICD-10-CM

## 2018-03-23 DIAGNOSIS — M25.50 ARTHRALGIA, UNSPECIFIED JOINT: ICD-10-CM

## 2018-03-23 DIAGNOSIS — R05.9 COUGH: ICD-10-CM

## 2018-03-23 DIAGNOSIS — K21.9 GASTROESOPHAGEAL REFLUX DISEASE, ESOPHAGITIS PRESENCE NOT SPECIFIED: ICD-10-CM

## 2018-03-23 LAB — HGB BLD-MCNC: 13 G/DL (ref 11.7–15.7)

## 2018-03-23 PROCEDURE — 82043 UR ALBUMIN QUANTITATIVE: CPT | Performed by: NURSE PRACTITIONER

## 2018-03-23 PROCEDURE — 99213 OFFICE O/P EST LOW 20 MIN: CPT | Mod: 25 | Performed by: NURSE PRACTITIONER

## 2018-03-23 PROCEDURE — 84439 ASSAY OF FREE THYROXINE: CPT | Performed by: NURSE PRACTITIONER

## 2018-03-23 PROCEDURE — 80048 BASIC METABOLIC PNL TOTAL CA: CPT | Performed by: NURSE PRACTITIONER

## 2018-03-23 PROCEDURE — 80061 LIPID PANEL: CPT | Performed by: NURSE PRACTITIONER

## 2018-03-23 PROCEDURE — G0402 INITIAL PREVENTIVE EXAM: HCPCS | Performed by: NURSE PRACTITIONER

## 2018-03-23 PROCEDURE — 84443 ASSAY THYROID STIM HORMONE: CPT | Performed by: NURSE PRACTITIONER

## 2018-03-23 PROCEDURE — 36415 COLL VENOUS BLD VENIPUNCTURE: CPT | Performed by: NURSE PRACTITIONER

## 2018-03-23 PROCEDURE — 85018 HEMOGLOBIN: CPT | Performed by: NURSE PRACTITIONER

## 2018-03-23 RX ORDER — LOSARTAN POTASSIUM 50 MG/1
50 TABLET ORAL DAILY
Qty: 90 TABLET | Refills: 3 | Status: SHIPPED | OUTPATIENT
Start: 2018-03-23 | End: 2019-01-21

## 2018-03-23 RX ORDER — FLUTICASONE PROPIONATE 50 MCG
1 SPRAY, SUSPENSION (ML) NASAL DAILY
Qty: 1 BOTTLE | Refills: 11 | Status: CANCELLED | OUTPATIENT
Start: 2018-03-23

## 2018-03-23 RX ORDER — CALCITRIOL 0.25 UG/1
0.25 CAPSULE, LIQUID FILLED ORAL DAILY
Qty: 90 CAPSULE | Refills: 3 | Status: SHIPPED | OUTPATIENT
Start: 2018-03-23 | End: 2019-04-22

## 2018-03-23 RX ORDER — CITALOPRAM HYDROBROMIDE 40 MG/1
40 TABLET ORAL DAILY
Qty: 90 TABLET | Refills: 3 | Status: SHIPPED | OUTPATIENT
Start: 2018-03-23 | End: 2019-01-21

## 2018-03-23 RX ORDER — PRAVASTATIN SODIUM 20 MG
20 TABLET ORAL DAILY
Qty: 90 TABLET | Refills: 3 | Status: SHIPPED | OUTPATIENT
Start: 2018-03-23 | End: 2019-01-21

## 2018-03-23 RX ORDER — FLUTICASONE PROPIONATE 50 MCG
1-2 SPRAY, SUSPENSION (ML) NASAL DAILY
Qty: 1 BOTTLE | Refills: 1 | Status: CANCELLED | OUTPATIENT
Start: 2018-03-23

## 2018-03-23 ASSESSMENT — ACTIVITIES OF DAILY LIVING (ADL)
CURRENT_FUNCTION: NO ASSISTANCE NEEDED
I_NEED_ASSISTANCE_FOR_THE_FOLLOWING_DAILY_ACTIVITIES:: NO ASSISTANCE IS NEEDED

## 2018-03-23 ASSESSMENT — ENCOUNTER SYMPTOMS
HEMATURIA: 0
PALPITATIONS: 0
WEAKNESS: 0
EYE PAIN: 0
DIZZINESS: 0
CHILLS: 0
FEVER: 0
FREQUENCY: 0
DIARRHEA: 0
ABDOMINAL PAIN: 0
SHORTNESS OF BREATH: 0
HEMATOCHEZIA: 0
NERVOUS/ANXIOUS: 0
HEARTBURN: 0
MYALGIAS: 0
ARTHRALGIAS: 1
COUGH: 0
CONSTIPATION: 0
DYSURIA: 0
SORE THROAT: 0
JOINT SWELLING: 0
HEADACHES: 0
PARESTHESIAS: 0
NAUSEA: 0

## 2018-03-23 NOTE — PROGRESS NOTES
SUBJECTIVE:   Angela Story is a 67 year old female who presents for Preventive Visit.      Are you in the first 12 months of your Medicare coverage?  Yes,  Visual Acuity:  Right Eye: 20/20   Left Eye: 20/20  Both Eyes: 20/16    Physical   Annual:     Getting at least 3 servings of Calcium per day::  Yes    Bi-annual eye exam::  Yes    Dental care twice a year::  Yes    Sleep apnea or symptoms of sleep apnea::  Daytime drowsiness    Diet::  Low salt, Low fat/cholesterol and Vegetarian/vegan    Frequency of exercise::  4-5 days/week    Duration of exercise::  45-60 minutes    Taking medications regularly::  Yes    Medication side effects::  None    Additional concerns today::  No    Ability to successfully perform activities of daily living: no assistance needed  Home Safety:  No safety concerns identified  Hearing Impairment: no hearing concerns        Fall risk:  Fallen 2 or more times in the past year?: No  Any fall with injury in the past year?: No    COGNITIVE SCREEN  1) Repeat 3 items (Banana, Sunrise, Chair)    2) Clock draw: NORMAL  3) 3 item recall: Recalls 2 objects   Results: NORMAL clock, 1-2 items recalled: COGNITIVE IMPAIRMENT LESS LIKELY    Mini-CogTM Copyright S Yolanda. Licensed by the author for use in [facility]; reprinted with permission (soharley@[facility]). All rights reserved.        Reviewed and updated as needed this visit by clinical staff         Reviewed and updated as needed this visit by Provider        Social History   Substance Use Topics     Smoking status: Never Smoker     Smokeless tobacco: Never Used     Alcohol use Yes      Comment: 1 per week       No flowsheet data found.        Hoarseness       Duration: 1 year    Description (location/character/radiation): hoarseness     Intensity:  mild    Accompanying signs and symptoms: productive green sputum with cough, runny nose and hoarseness, sensation in throat      History (similar episodes/previous evaluation): hx of getting  cold often in past 2 years     Precipitating or alleviating factors: maybe connected to GERD when drinking diet coke     Therapies tried and outcome: Prilosec, Flonase, liquids (cold treatments)      Comes and goes. Notices it when singing a song with her granddaughter. In the past year has started having episodes of productive cough lasting a few weeks at a time. Gets really hoarse with that.  Gets worse with drinking Diet Coke. Doesn't have any of the other symptoms of GERD.    Today's PHQ-2 Score:   PHQ-2 ( 1999 [employer]) 3/19/2018   Q1: Little interest or pleasure in doing things 0   Q2: Feeling down, depressed or hopeless 0   PHQ-2 Score 0   Q1: Little interest or pleasure in doing things Not at all   Q2: Feeling down, depressed or hopeless Not at all   PHQ-2 Score 0       Do you feel safe in your environment - Yes    Do you have a Health Care Directive?: Yes: Advance Directive has been received and scanned.    Current providers sharing in care for this patient include:   Patient Care Team:  Tahira Wallis MD as PCP - General (Internal Medicine)  Satish Scott as Other (see comments) (Therapist)    The following health maintenance items are reviewed in Epic and correct as of today:  Health Maintenance   Topic Date Due     TSH Q1 YEAR  09/22/2017     HEMOGLOBIN Q1 YR  09/22/2017     LIPID MONITORING Q1 YEAR  09/22/2017     COLON CANCER SCREEN (SYSTEM ASSIGNED)  12/17/2017     BMP Q1 YR  07/24/2018     MICROALBUMIN Q1 YEAR  07/24/2018     PNEUMOCOCCAL (2 of 2 - PPSV23) 07/24/2018     INFLUENZA VACCINE (SYSTEM ASSIGNED)  09/01/2018     MAMMO Q2 YR  09/20/2018     FALL RISK ASSESSMENT  10/04/2018     PNEUMO 5YR BOOST DUE AFTER AGE 65 (NO IB MSG) (2) 04/14/2019     PAP Q5 YEARS  12/31/2020     HPV Q5 YEARS (Complete with PAP)  12/31/2020     ADVANCE DIRECTIVE PLANNING Q5 YRS  12/07/2021     TETANUS IMMUNIZATION (SYSTEM ASSIGNED)  07/24/2027     DEXA SCAN SCREENING (SYSTEM ASSIGNED)  Completed      "HEPATITIS C SCREENING  Completed     Labs reviewed in EPIC    Due for second pneumovax 6/15.    Review of Systems   Constitutional: Negative for chills and fever.   HENT: Negative for congestion, ear pain, hearing loss and sore throat.    Eyes: Negative for pain and visual disturbance.   Respiratory: Negative for cough and shortness of breath.    Cardiovascular: Negative for chest pain, palpitations and peripheral edema.   Gastrointestinal: Negative for abdominal pain, constipation, diarrhea, heartburn, hematochezia and nausea.   Genitourinary: Negative for dysuria, frequency, genital sores, hematuria, pelvic pain, urgency, vaginal bleeding and vaginal discharge.   Musculoskeletal: Positive for arthralgias. Negative for joint swelling and myalgias.   Skin: Negative for rash.   Neurological: Negative for dizziness, weakness, headaches and paresthesias.   Psychiatric/Behavioral: Negative for mood changes. The patient is not nervous/anxious.          OBJECTIVE:   /70  Pulse 67  Temp 98.5  F (36.9  C) (Oral)  Ht 5' 1.54\" (1.563 m)  Wt 191 lb 4.8 oz (86.8 kg)  LMP 07/23/2004  SpO2 97%  BMI 35.52 kg/m2 Estimated body mass index is 35.52 kg/(m^2) as calculated from the following:    Height as of this encounter: 5' 1.54\" (1.563 m).    Weight as of this encounter: 191 lb 4.8 oz (86.8 kg).  Physical Exam  GENERAL: healthy, alert and no distress  EYES: Eyes grossly normal to inspection, PERRL and conjunctivae and sclerae normal  HENT: ear canals and TM's normal, nose and mouth without ulcers or lesions  NECK: no adenopathy, no asymmetry, masses, or scars and thyroid normal to palpation  RESP: lungs clear to auscultation - no rales, rhonchi or wheezes  BREAST: normal without masses, tenderness or nipple discharge and no palpable axillary masses or adenopathy  CV: regular rate and rhythm, normal S1 S2, no S3 or S4, no murmur, click or rub, no peripheral edema and peripheral pulses strong  ABDOMEN: soft, nontender, " no hepatosplenomegaly, no masses and bowel sounds normal  MS: no gross musculoskeletal defects noted, no edema  SKIN: no suspicious lesions or rashes  NEURO: Normal strength and tone, mentation intact and speech normal  PSYCH: mentation appears normal, affect normal/bright    ASSESSMENT / PLAN:   1. Healthcare maintenance  - Hemoglobin  - Lipid panel reflex to direct LDL Fasting  - TSH WITH FREE T4 REFLEX    2. Screen for colon cancer  Has colonoscopy scheduled.   - GASTROENTEROLOGY ADULT REF PROCEDURE ONLY    3. Hyperlipidemia LDL goal <100  Needs recheck  - pravastatin (PRAVACHOL) 20 MG tablet; Take 1 tablet (20 mg) by mouth daily  Dispense: 90 tablet; Refill: 3    4. CKD (chronic kidney disease) stage 3, GFR 30-59 ml/min  Needs monitoring.  - calcitRIOL (ROCALTROL) 0.25 MCG capsule; Take 1 capsule (0.25 mcg) by mouth daily  Dispense: 90 capsule; Refill: 3    5. Hypertension goal BP (blood pressure) < 140/90  Well controlled. Needs labs.   - Albumin Random Urine Quantitative with Creat Ratio  - Basic metabolic panel  - losartan (COZAAR) 50 MG tablet; Take 1 tablet (50 mg) by mouth daily  Dispense: 90 tablet; Refill: 3    6. Gastroesophageal reflux disease, esophagitis presence not specified  She isn't sure if she has true GERD or not. Only takes PPI because she has hoarse voice and thinks it may help. Planning to see ENT to eval for hoarse voice. If ENT thinks this is GERD related, I told her she should have a routine endoscopy. Discussed risks of long term PPI.    7. Generalized anxiety disorder  Well controlled  - citalopram (CELEXA) 40 MG tablet; Take 1 tablet (40 mg) by mouth daily  Dispense: 90 tablet; Refill: 3    8. Arthralgia, unspecified joint  Bilateral knee pain. H/O meniscal injury. States this feels the same.    9. Hoarseness  Longstanding issue. She thinks this may be somewhat relived by PPI but wants to get to the root of the issue. Recent CXR without signs of any masses. HEENT/Neck exam  "normal.  -Referred to [facility]    10. Cough  As above. Patient with intermittent productive cough. CXR showed signs of emphysema but patient without smoking history. No shortness of breath. If cough continues/worsens or she develops shortness of breath we discussed doing PFTs.   - omeprazole (PRILOSEC) 20 MG CR capsule; Take 1 capsule (20 mg) by mouth daily  Dispense: 90 capsule; Refill: 3  - fluticasone (FLONASE) 50 MCG/ACT spray; Spray 1-2 sprays into both nostrils daily  Dispense: 1 Bottle; Refill: 1  - fluticasone (FLONASE) 50 MCG/ACT spray; Spray 1 spray into both nostrils daily  Dispense: 1 Bottle; Refill: 11    End of Life Planning:  Patient currently has an advanced directive: No.  I have verified the patient's ablity to prepare an advanced directive/make health care decisions.  Literature was provided to assist patient in preparing an advanced directive.    COUNSELING:  Reviewed preventive health counseling, as reflected in patient instructions        Estimated body mass index is 35.32 kg/(m^2) as calculated from the following:    Height as of 10/4/17: 5' 1.5\" (1.562 m).    Weight as of 10/4/17: 190 lb (86.2 kg).  Weight management plan: Discussed healthy diet and exercise guidelines and patient will follow up in 12 months in clinic to re-evaluate.   reports that she has never smoked. She has never used smokeless tobacco.      Appropriate preventive services were discussed with this patient, including applicable screening as appropriate for cardiovascular disease, diabetes, osteopenia/osteoporosis, and glaucoma.  As appropriate for age/gender, discussed screening for colorectal cancer, prostate cancer, breast cancer, and cervical cancer. Checklist reviewing preventive services available has been given to the patient.    Reviewed patients plan of care and provided an AVS. The Basic Care Plan (routine screening as documented in Health Maintenance) for Angela meets the Care Plan requirement. This Care Plan has " been established and reviewed with the Patient.    Counseling Resources:  ATP IV Guidelines  Pooled Cohorts Equation Calculator  Breast Cancer Risk Calculator  FRAX Risk Assessment  ICSI Preventive Guidelines  Dietary Guidelines for Americans, 2010  USDA's MyPlate  ASA Prophylaxis  Lung CA Screening    KAVITHA Denny [facility] [place]  Answers for HPI/ROS submitted by the patient on 3/23/2018   PHQ-2 Score: 0

## 2018-03-23 NOTE — MR AVS SNAPSHOT
After Visit Summary   3/23/2018    Angela Story    MRN: [id number]           Patient Information     Date Of Birth          1950        Visit Information        Provider Department      3/23/2018 9:40 AM Sylvia Burks APRN [facility]        Today's Diagnoses     Healthcare maintenance    -  1    Screen for colon cancer        Hyperlipidemia LDL goal <100        CKD (chronic kidney disease) stage 3, GFR 30-59 ml/min        Hypertension goal BP (blood pressure) < 140/90        Gastroesophageal reflux disease, esophagitis presence not specified        Generalized anxiety disorder        Arthralgia, unspecified joint        Hoarseness        Cough          Care Instructions    -After 6/15 get your pneumonia shot  -See ENT (phone number below) regarding your hoarseness      Preventive Health Recommendations    Female Ages 65 +    Yearly exam:     See your health care provider every year in order to  o Review health changes.   o Discuss preventive care.    o Review your medicines if your doctor has prescribed any.      You no longer need a yearly Pap test unless you've had an abnormal Pap test in the past 10 years. If you have vaginal symptoms, such as bleeding or discharge, be sure to talk with your provider about a Pap test.      Every 1 to 2 years, have a mammogram.  If you are over 69, talk with your health care provider about whether or not you want to continue having screening mammograms.      Every 10 years, have a colonoscopy. Or, have a yearly FIT test (stool test). These exams will check for colon cancer.       Have a cholesterol test every 5 years, or more often if your doctor advises it.       Have a diabetes test (fasting glucose) every three years. If you are at risk for diabetes, you should have this test more often.       At age 65, have a bone density scan (DEXA) to check for osteoporosis (brittle bone disease).    Shots:    Get a flu shot each year.    Get a  tetanus shot every 10 years.    Talk to your doctor about your pneumonia vaccines. There are now two you should receive - Pneumovax (PPSV 23) and Prevnar (PCV 13).    Talk to your doctor about the shingles vaccine.    Talk to your doctor about the hepatitis B vaccine.    Nutrition:     Eat at least 5 servings of fruits and vegetables each day.      Eat whole-grain bread, whole-wheat pasta and brown rice instead of white grains and rice.      Talk to your provider about Calcium and Vitamin D.     Lifestyle    Exercise at least 150 minutes a week (30 minutes a day, 5 days a week). This will help you control your weight and prevent disease.      Limit alcohol to one drink per day.      No smoking.       Wear sunscreen to prevent skin cancer.       See your dentist twice a year for an exam and cleaning.      See your eye doctor every 1 to 2 years to screen for conditions such as glaucoma, macular degeneration and cataracts.          Follow-ups after your visit        Additional Services     OTOLARYNGOLOGY REFERRAL       Your provider has referred you to: [facility] [phone]   [email or web address]    Please be aware that coverage of these services is subject to the terms and limitations of your health insurance plan.  Call member services at your health plan with any benefit or coverage questions.      Please bring the following with you to your appointment:    (1) Any X-Rays, CTs or MRIs which have been performed.  Contact the facility where they were done to arrange for  prior to your scheduled appointment.   (2) List of current medications  (3) This referral request   (4) Any documents/labs given to you for this referral                  Your next 10 appointments already scheduled     Mar 27, 2018   Procedure with Greg Lizarraga MD   [facility] ([facility])    [address]  [place] [id number] [phone]         "   [facility] is located at [address] [place] [address] [place]              Who to contact     If you have questions or need follow up information about today's clinic visit or your schedule please contact [facility] directly at [phone].  Normal or non-critical lab and imaging results will be communicated to you by MyChart, letter or phone within 4 business days after the clinic has received the results. If you do not hear from us within 7 days, please contact the clinic through [employer] or phone. If you have a critical or abnormal lab result, we will notify you by phone as soon as possible.  Submit refill requests through [employer] or call your pharmacy and they will forward the refill request to us. Please allow 3 business days for your refill to be completed.          Additional Information About Your Visit        [employer] Information     [employer] gives you secure access to your electronic health record. If you see a primary care provider, you can also send messages to your care team and make appointments. If you have questions, please call your primary care clinic.  If you do not have a primary care provider, please call [phone] and they will assist you.        Care EveryWhere ID     This is your Care EveryWhere ID. This could be used by other organizations to access your [place] medical records  [id number]        Your Vitals Were     Pulse Temperature Height Last Period Pulse Oximetry BMI (Body Mass Index)    67 98.5  F (36.9  C) (Oral) 5' 1.54\" (1.563 m) 07/23/2004 97% 35.52 kg/m2       Blood Pressure from Last 3 Encounters:   03/23/18 126/70   10/04/17 136/84   07/24/17 126/78    Weight from Last 3 Encounters:   03/23/18 191 lb 4.8 oz (86.8 kg)   10/04/17 190 lb (86.2 kg)   07/24/17 183 lb (83 kg)              We Performed the Following     Albumin Random Urine Quantitative with Creat Ratio     Basic metabolic panel     Hemoglobin     Lipid panel reflex to direct LDL Fasting     " OTOLARYNGOLOGY REFERRAL     TSH WITH FREE T4 REFLEX          Today's Medication Changes          These changes are accurate as of 3/23/18 10:24 AM.  If you have any questions, ask your nurse or doctor.               These medicines have changed or have updated prescriptions.        Dose/Directions    omeprazole 20 MG CR capsule   Commonly known as:  priLOSEC   This may have changed:  Another medication with the same name was removed. Continue taking this medication, and follow the directions you see here.   Used for:  Cough   Changed by:  Sylvia Burks APRN CNP        Dose:  20 mg   Take 1 capsule (20 mg) by mouth daily   Quantity:  90 capsule   Refills:  3            Where to get your medicines      These medications were sent to [facility] - [place], [place] - [address] [facility]  [address], [facility] [address]     Phone:  [phone]     calcitRIOL 0.25 MCG capsule    citalopram 40 MG tablet    losartan 50 MG tablet    omeprazole 20 MG CR capsule    pravastatin 20 MG tablet                Primary Care Provider Office Phone # Fax #    Tahira Wallis [phone] [address] [place] [address]        Equal Access to Services     [facility] AH: Hadii aad ku hadasho Soomaali, waaxda luqadaha, qaybta kaalmada adeegyada, yandy laurent. So [facility] [phone].    ATENCIÓN: Si habla español, tiene a stevens disposición servicios gratuitos de asistencia lingüística. Luana al [phone].    We comply with applicable federal civil rights laws and [place] laws. We do not discriminate on the basis of race, color, national origin, age, disability, sex, sexual orientation, or gender identity.            Thank you!     Thank you for choosing [facility]  for your care. Our goal is always to provide you with excellent care. Hearing back from our patients is one way we can continue to improve our  services. Please take a few minutes to complete the written survey that you may receive in the mail after your visit with us. Thank you!             Your Updated Medication List - Protect others around you: Learn how to safely use, store and throw away your medicines at www.disposemymeds.org.          This list is accurate as of 3/23/18 10:24 AM.  Always use your most recent med list.                   Brand Name Dispense Instructions for use Diagnosis    Blood Pressure Monitoring Kit     1 kit    1 kit daily    Hypertension goal BP (blood pressure) < 140/90       calcitRIOL 0.25 MCG capsule    ROCALTROL    90 capsule    Take 1 capsule (0.25 mcg) by mouth daily    CKD (chronic kidney disease) stage 3, GFR 30-59 ml/min       cholecalciferol 1000 UNIT tablet    vitamin D3    100 tablet    Take 1 tablet by mouth daily.    Routine general medical examination at a health care facility       citalopram 40 MG tablet    celeXA    90 tablet    Take 1 tablet (40 mg) by mouth daily    Generalized anxiety disorder       * fluticasone 50 MCG/ACT spray    FLONASE    1 Bottle    Spray 1 spray into both nostrils daily    Cough       * fluticasone 50 MCG/ACT spray    FLONASE    1 Bottle    Spray 1-2 sprays into both nostrils daily    Cough       losartan 50 MG tablet    COZAAR    90 tablet    Take 1 tablet (50 mg) by mouth daily    Hypertension goal BP (blood pressure) < 140/90       omeprazole 20 MG CR capsule    priLOSEC    90 capsule    Take 1 capsule (20 mg) by mouth daily    Cough       pravastatin 20 MG tablet    PRAVACHOL    90 tablet    Take 1 tablet (20 mg) by mouth daily    Hyperlipidemia LDL goal <100       * Notice:  This list has 2 medication(s) that are the same as other medications prescribed for you. Read the directions carefully, and ask your doctor or other care provider to review them with you.

## 2018-03-23 NOTE — PATIENT INSTRUCTIONS
-After 6/15 get your pneumonia shot  -See ENT (phone number below) regarding your hoarseness      Preventive Health Recommendations    Female Ages 65 +    Yearly exam:     See your health care provider every year in order to  o Review health changes.   o Discuss preventive care.    o Review your medicines if your doctor has prescribed any.      You no longer need a yearly Pap test unless you've had an abnormal Pap test in the past 10 years. If you have vaginal symptoms, such as bleeding or discharge, be sure to talk with your provider about a Pap test.      Every 1 to 2 years, have a mammogram.  If you are over 69, talk with your health care provider about whether or not you want to continue having screening mammograms.      Every 10 years, have a colonoscopy. Or, have a yearly FIT test (stool test). These exams will check for colon cancer.       Have a cholesterol test every 5 years, or more often if your doctor advises it.       Have a diabetes test (fasting glucose) every three years. If you are at risk for diabetes, you should have this test more often.       At age 65, have a bone density scan (DEXA) to check for osteoporosis (brittle bone disease).    Shots:    Get a flu shot each year.    Get a tetanus shot every 10 years.    Talk to your doctor about your pneumonia vaccines. There are now two you should receive - Pneumovax (PPSV 23) and Prevnar (PCV 13).    Talk to your doctor about the shingles vaccine.    Talk to your doctor about the hepatitis B vaccine.    Nutrition:     Eat at least 5 servings of fruits and vegetables each day.      Eat whole-grain bread, whole-wheat pasta and brown rice instead of white grains and rice.      Talk to your provider about Calcium and Vitamin D.     Lifestyle    Exercise at least 150 minutes a week (30 minutes a day, 5 days a week). This will help you control your weight and prevent disease.      Limit alcohol to one drink per day.      No smoking.       Wear sunscreen to  prevent skin cancer.       See your dentist twice a year for an exam and cleaning.      See your eye doctor every 1 to 2 years to screen for conditions such as glaucoma, macular degeneration and cataracts.

## 2018-03-24 LAB
ANION GAP SERPL CALCULATED.3IONS-SCNC: 5 MMOL/L (ref 3–14)
BUN SERPL-MCNC: 17 MG/DL (ref 7–30)
CALCIUM SERPL-MCNC: 9.4 MG/DL (ref 8.5–10.1)
CHLORIDE SERPL-SCNC: 108 MMOL/L (ref 94–109)
CHOLEST SERPL-MCNC: 179 MG/DL
CO2 SERPL-SCNC: 27 MMOL/L (ref 20–32)
CREAT SERPL-MCNC: 0.9 MG/DL (ref 0.52–1.04)
CREAT UR-MCNC: 44 MG/DL
GFR SERPL CREATININE-BSD FRML MDRD: 63 ML/MIN/1.7M2
GLUCOSE SERPL-MCNC: 102 MG/DL (ref 70–99)
HDLC SERPL-MCNC: 64 MG/DL
LDLC SERPL CALC-MCNC: 90 MG/DL
MICROALBUMIN UR-MCNC: <5 MG/L
MICROALBUMIN/CREAT UR: NORMAL MG/G CR (ref 0–25)
NONHDLC SERPL-MCNC: 115 MG/DL
POTASSIUM SERPL-SCNC: 4.1 MMOL/L (ref 3.4–5.3)
SODIUM SERPL-SCNC: 140 MMOL/L (ref 133–144)
T4 FREE SERPL-MCNC: 0.91 NG/DL (ref 0.76–1.46)
TRIGL SERPL-MCNC: 123 MG/DL
TSH SERPL DL<=0.005 MIU/L-ACNC: 4.03 MU/L (ref 0.4–4)

## 2018-03-27 ENCOUNTER — HOSPITAL ENCOUNTER (OUTPATIENT)
Facility: CLINIC | Age: 68
Discharge: HOME | End: 2018-03-27
Attending: COLON & RECTAL SURGERY | Admitting: COLON & RECTAL SURGERY
Payer: MEDICARE

## 2018-03-27 ENCOUNTER — MYC MEDICAL ADVICE (OUTPATIENT)
Dept: PEDIATRICS | Facility: CLINIC | Age: 68
End: 2018-03-27

## 2018-03-27 ENCOUNTER — SURGERY (OUTPATIENT)
Age: 68
End: 2018-03-27

## 2018-03-27 VITALS
BODY MASS INDEX: 34.96 KG/M2 | HEIGHT: 62 IN | DIASTOLIC BLOOD PRESSURE: 97 MMHG | OXYGEN SATURATION: 94 % | SYSTOLIC BLOOD PRESSURE: 123 MMHG | RESPIRATION RATE: 17 BRPM | WEIGHT: 190 LBS

## 2018-03-27 LAB — COLONOSCOPY: NORMAL

## 2018-03-27 PROCEDURE — 88305 TISSUE EXAM BY PATHOLOGIST: CPT | Mod: 26 | Performed by: COLON & RECTAL SURGERY

## 2018-03-27 PROCEDURE — 99153 MOD SED SAME PHYS/QHP EA: CPT | Performed by: COLON & RECTAL SURGERY

## 2018-03-27 PROCEDURE — G0500 MOD SEDAT ENDO SERVICE >5YRS: HCPCS

## 2018-03-27 PROCEDURE — 45385 COLONOSCOPY W/LESION REMOVAL: CPT | Performed by: COLON & RECTAL SURGERY

## 2018-03-27 PROCEDURE — 25000128 H RX IP 250 OP 636: Performed by: COLON & RECTAL SURGERY

## 2018-03-27 PROCEDURE — 88305 TISSUE EXAM BY PATHOLOGIST: CPT | Performed by: COLON & RECTAL SURGERY

## 2018-03-27 RX ORDER — NALOXONE HYDROCHLORIDE 0.4 MG/ML
.1-.4 INJECTION, SOLUTION INTRAMUSCULAR; INTRAVENOUS; SUBCUTANEOUS
Status: DISCONTINUED | OUTPATIENT
Start: 2018-03-27 | End: 2018-03-27 | Stop reason: HOSPADM

## 2018-03-27 RX ORDER — FLUMAZENIL 0.1 MG/ML
0.2 INJECTION, SOLUTION INTRAVENOUS
Status: DISCONTINUED | OUTPATIENT
Start: 2018-03-27 | End: 2018-03-27 | Stop reason: HOSPADM

## 2018-03-27 RX ORDER — FENTANYL CITRATE 50 UG/ML
INJECTION, SOLUTION INTRAMUSCULAR; INTRAVENOUS PRN
Status: DISCONTINUED | OUTPATIENT
Start: 2018-03-27 | End: 2018-03-27 | Stop reason: HOSPADM

## 2018-03-27 RX ORDER — ONDANSETRON 2 MG/ML
4 INJECTION INTRAMUSCULAR; INTRAVENOUS
Status: COMPLETED | OUTPATIENT
Start: 2018-03-27 | End: 2018-03-27

## 2018-03-27 RX ORDER — ONDANSETRON 4 MG/1
4 TABLET, ORALLY DISINTEGRATING ORAL EVERY 6 HOURS PRN
Status: DISCONTINUED | OUTPATIENT
Start: 2018-03-27 | End: 2018-03-27 | Stop reason: HOSPADM

## 2018-03-27 RX ORDER — PROCHLORPERAZINE MALEATE 5 MG
5 TABLET ORAL EVERY 6 HOURS PRN
Status: DISCONTINUED | OUTPATIENT
Start: 2018-03-27 | End: 2018-03-27 | Stop reason: HOSPADM

## 2018-03-27 RX ORDER — LIDOCAINE 40 MG/G
CREAM TOPICAL
Status: DISCONTINUED | OUTPATIENT
Start: 2018-03-27 | End: 2018-03-27 | Stop reason: HOSPADM

## 2018-03-27 RX ORDER — ONDANSETRON 2 MG/ML
4 INJECTION INTRAMUSCULAR; INTRAVENOUS EVERY 6 HOURS PRN
Status: DISCONTINUED | OUTPATIENT
Start: 2018-03-27 | End: 2018-03-27 | Stop reason: HOSPADM

## 2018-03-27 RX ADMIN — MIDAZOLAM 2 MG: 1 INJECTION INTRAMUSCULAR; INTRAVENOUS at 07:38

## 2018-03-27 RX ADMIN — FENTANYL CITRATE 100 MCG: 50 INJECTION, SOLUTION INTRAMUSCULAR; INTRAVENOUS at 07:38

## 2018-03-27 RX ADMIN — ONDANSETRON 4 MG: 2 INJECTION INTRAMUSCULAR; INTRAVENOUS at 07:37

## 2018-03-27 NOTE — H&P
Pre-Endoscopy History and Physical   Angela Story MRN# [id number]   YOB: 1950 Age: 67 year old   Date of Procedure: 3/27/2018   Primary care provider: Tahira Wallis   Type of Endoscopy: colonoscopy   Reason for Procedure: screening   Type of Anesthesia Anticipated: Moderate Sedation   HPI:   Angela is a 67 year old female for screening colonoscopy.  She last had a colonoscopy in  which was normal.  She denies BRBPR, abdominal pain, nausea/vomiting, changes in bowel habits or unintentional weight loss.  She denies a FH of CRC.    Allergies   Allergen Reactions     Norco [Hydrocodone-Acetaminophen] Nausea and Vomiting     Dizzy     Eggs Nausea and Vomiting and Diarrhea     Lisinopril Cough     Mucinex Hives     Penicillins Rash     Sulfa Drugs Hives     Sulfites Nausea and Vomiting and Diarrhea      Prior to Admission Medications   Prescriptions Last Dose Informant Patient Reported? Taking?   Blood Pressure Monitoring KIT   No No   Si kit daily   calcitRIOL (ROCALTROL) 0.25 MCG capsule 3/26/2018  No Yes   Sig: Take 1 capsule (0.25 mcg) by mouth daily   cholecalciferol (VITAMIN D) 1000 UNIT tablet 3/26/2018  Yes Yes   Sig: Take 1 tablet by mouth daily.   citalopram (CELEXA) 40 MG tablet 3/26/2018  No Yes   Sig: Take 1 tablet (40 mg) by mouth daily   fluticasone (FLONASE) 50 MCG/ACT spray Unknown  No No   Sig: Spray 1 spray into both nostrils daily   Patient not taking: Reported on 3/23/2018   fluticasone (FLONASE) 50 MCG/ACT spray Unknown  No No   Sig: Spray 1-2 sprays into both nostrils daily   Patient not taking: Reported on 3/23/2018   losartan (COZAAR) 50 MG tablet 3/26/2018  No Yes   Sig: Take 1 tablet (50 mg) by mouth daily   omeprazole (PRILOSEC) 20 MG CR capsule Unknown  No No   Sig: Take 1 capsule (20 mg) by mouth daily   pravastatin (PRAVACHOL) 20 MG tablet 3/26/2018  No Yes   Sig: Take 1 tablet (20 mg) by mouth daily      Facility-Administered Medications: None      Patient  Active Problem List   Diagnosis     HYPERLIPIDEMIA LDL GOAL <100     CKD (chronic kidney disease) stage 3, GFR 30-59 ml/min     Advanced directives, counseling/discussion     Hypertension goal BP (blood pressure) < 140/90     Esophageal reflux     Right shoulder pain     Osteoarthritis of acromioclavicular joint     Bilateral bunions     Generalized anxiety disorder     Thyroid nodule     IFG (impaired fasting glucose)     Non morbid obesity, unspecified obesity type      Past Medical History:   Diagnosis Date     Bilateral bunions 2014     CKD (chronic kidney disease) stage 3, GFR 30-59 ml/min      Gastro-oesophageal reflux disease      Hyperlipidemia LDL goal <100 2010     Hypertension goal BP (blood pressure) < 140/90 3/7/2013     IFG (impaired fasting glucose) 2016     PONV (postoperative nausea and vomiting)      Thyroid nodule 2015      Past Surgical History:   Procedure Laterality Date     BUNIONECTOMY  2014    Procedure: BUNIONECTOMY;  Surgeon: Kevin Rodas DPM;  Location: [facility] OR     C  DELIVERY ONLY      , Low Cervical     C NONSPECIFIC PROCEDURE      lumpectomy, lt     C NONSPECIFIC PROCEDURE      ob      C NONSPECIFIC PROCEDURE      Vaginal delivery x 1     EXCISE MASS BACK  2014    Procedure: EXCISE MASS BACK;  Surgeon: Chaim Lacy MD;  Location: [facility] OR     HC ARTHROTOMY W/OPEN MENISCUS REPAIR      lt knee     HC DILATION/CURETTAGE DIAG/THER NON OB      D & C     TONSILLECTOMY      tonsillectomy      Social History   Substance Use Topics     Smoking status: Never Smoker     Smokeless tobacco: Never Used     Alcohol use Yes      Comment: 1 per week      Family History   Problem Relation Age of Onset     Lipids Mother      Hypertension Mother      CANCER Father      pancreatic     Alcohol/Drug Father      etoh     Other Cancer Father      Depression Father      Substance Abuse Father      HEART DISEASE Maternal Grandfather       "Coronary Artery Disease Maternal Grandfather      CEREBROVASCULAR DISEASE Paternal Grandfather      Psychotic Disorder Sister      bi polar     Neurologic Disorder Sister      ms     Neurologic Disorder Sister      cidp     CEREBROVASCULAR DISEASE Paternal Grandmother       PHYSICAL EXAM:   Resp 16  Ht 1.575 m (5' 2\")  Wt 86.2 kg (190 lb)  LMP 07/23/2004  SpO2 99%  BMI 34.75 kg/m2 Estimated body mass index is 34.75 kg/(m^2) as calculated from the following:    Height as of this encounter: 1.575 m (5' 2\").    Weight as of this encounter: 86.2 kg (190 lb).   RESP: lungs clear to auscultation - no rales, rhonchi or wheezes   CV: regular rates and rhythm   ASA Class 3 - Severe systemic disease, but not incapacitating    Assessment: 68 y/o woman for screening colonoscopy    Plan: Colonoscopy with moderate sedation.  Risks of the procedure were discussed including, but not limited to, bleeding, perforation and missed lesions.  Patient understands and is willing to proceed.    Greg Lizarraga MD ....................  3/27/2018   7:35 AM  Colon and Rectal Surgery Staff  [phone]    "

## 2018-03-28 ENCOUNTER — TRANSFERRED RECORDS (OUTPATIENT)
Dept: HEALTH INFORMATION MANAGEMENT | Facility: CLINIC | Age: 68
End: 2018-03-28

## 2018-03-28 LAB — COPATH REPORT: NORMAL

## 2018-03-30 ENCOUNTER — ALLIED HEALTH/NURSE VISIT (OUTPATIENT)
Dept: NURSING | Facility: CLINIC | Age: 68
End: 2018-03-30
Payer: COMMERCIAL

## 2018-03-30 DIAGNOSIS — Z53.9 ERRONEOUS ENCOUNTER--DISREGARD: Primary | ICD-10-CM

## 2018-03-30 NOTE — MR AVS SNAPSHOT
After Visit Summary   3/30/2018    Angela Story    MRN: [id number]           Patient Information     Date Of Birth          1950        Visit Information        Provider Department      3/30/2018 9:30 AM TAWNYA NURSE AB [facility]        Today's Diagnoses     ERRONEOUS ENCOUNTER--DISREGARD    -  1       Follow-ups after your visit        Who to contact     If you have questions or need follow up information about today's clinic visit or your schedule please contact [facility] directly at [phone].  Normal or non-critical lab and imaging results will be communicated to you by [employer], letter or phone within 4 business days after the clinic has received the results. If you do not hear from us within 7 days, please contact the clinic through [employer] or phone. If you have a critical or abnormal lab result, we will notify you by phone as soon as possible.  Submit refill requests through [employer] or call your pharmacy and they will forward the refill request to us. Please allow 3 business days for your refill to be completed.          Additional Information About Your Visit        MyChart Information     [employer] gives you secure access to your electronic health record. If you see a primary care provider, you can also send messages to your care team and make appointments. If you have questions, please call your primary care clinic.  If you do not have a primary care provider, please call [phone] and they will assist you.        Care EveryWhere ID     This is your Care EveryWhere ID. This could be used by other organizations to access your [place] medical records  [id number]        Your Vitals Were     Last Period                   07/23/2004            Blood Pressure from Last 3 Encounters:   03/27/18 (!) 123/97   03/23/18 126/70   10/04/17 136/84    Weight from Last 3 Encounters:   03/27/18 190 lb (86.2 kg)   03/23/18 191 lb 4.8 oz (86.8 kg)   10/04/17 190 lb (86.2 kg)               Today, you had the following     No orders found for display       Primary Care Provider Office Phone # Fax #    Tahira Wallis [phone] [address] [place] [address]        Equal Access to Services     TOSHA GUSMAN : Hadii johnson ku camerono Sorusselali, waaxda luqadaha, qaybta kaalmada adenewtonyada, yandy elainen sharlene beard latramasia laurent. So [facility] [phone].    ATENCIÓN: Si habla español, tiene a stevens disposición servicios gratuitos de asistencia lingüística. Llame al [phone].    We comply with applicable federal civil rights laws and [place] laws. We do not discriminate on the basis of race, color, national origin, age, disability, sex, sexual orientation, or gender identity.            Thank you!     Thank you for choosing [facility]  for your care. Our goal is always to provide you with excellent care. Hearing back from our patients is one way we can continue to improve our services. Please take a few minutes to complete the written survey that you may receive in the mail after your visit with us. Thank you!             Your Updated Medication List - Protect others around you: Learn how to safely use, store and throw away your medicines at www.disposemymeds.org.          This list is accurate as of 3/30/18  4:13 PM.  Always use your most recent med list.                   Brand Name Dispense Instructions for use Diagnosis    Blood Pressure Monitoring Kit     1 kit    1 kit daily    Hypertension goal BP (blood pressure) < 140/90       calcitRIOL 0.25 MCG capsule    ROCALTROL    90 capsule    Take 1 capsule (0.25 mcg) by mouth daily    CKD (chronic kidney disease) stage 3, GFR 30-59 ml/min       cholecalciferol 1000 UNIT tablet    vitamin D3    100 tablet    Take 1 tablet by mouth daily.    Routine general medical examination at a health care facility       citalopram 40 MG tablet    celeXA    90 tablet    Take 1 tablet (40 mg) by mouth daily     Generalized anxiety disorder       * fluticasone 50 MCG/ACT spray    FLONASE    1 Bottle    Spray 1 spray into both nostrils daily    Cough       * fluticasone 50 MCG/ACT spray    FLONASE    1 Bottle    Spray 1-2 sprays into both nostrils daily    Cough       losartan 50 MG tablet    COZAAR    90 tablet    Take 1 tablet (50 mg) by mouth daily    Hypertension goal BP (blood pressure) < 140/90       omeprazole 20 MG CR capsule    priLOSEC    90 capsule    Take 1 capsule (20 mg) by mouth daily    Cough       pravastatin 20 MG tablet    PRAVACHOL    90 tablet    Take 1 tablet (20 mg) by mouth daily    Hyperlipidemia LDL goal <100       * Notice:  This list has 2 medication(s) that are the same as other medications prescribed for you. Read the directions carefully, and ask your doctor or other care provider to review them with you.

## 2018-03-30 NOTE — PROGRESS NOTES
Please disregard this encounter- patient has Medicare Part D insurance.    Instructed to have vaccine placed at pharmacy.

## 2018-07-20 DIAGNOSIS — F41.1 GENERALIZED ANXIETY DISORDER: ICD-10-CM

## 2018-07-20 NOTE — TELEPHONE ENCOUNTER
Pharmacy note: patient's rx has . Please write new rx  If appropriate.    Requested Prescriptions   Pending Prescriptions Disp Refills     citalopram (CELEXA) 40 MG tablet  Last Written Prescription Date:  2018  Last Fill Quantity: 90 tablet,  # refills: 3   Last office visit: 3/23/2018 with prescribing provider:      Sylvia Burks APRN CNP         Future Office Visit:     90 tablet 3     Sig: Take 1 tablet (40 mg) by mouth daily    There is no refill protocol information for this order       Routing refill request to provider for review/approval because:  Drug not on the [facility], P or [facility] refill protocol or controlled substance

## 2018-07-25 RX ORDER — CITALOPRAM HYDROBROMIDE 40 MG/1
40 TABLET ORAL DAILY
Qty: 90 TABLET | Refills: 3 | OUTPATIENT
Start: 2018-07-25

## 2018-07-25 NOTE — TELEPHONE ENCOUNTER
Refill request from the same pharmacy.  Refills given 3/23/18, dispense 90 with 3 refills.  Denied to the pharmacy with that note.

## 2018-07-29 ENCOUNTER — MYC MEDICAL ADVICE (OUTPATIENT)
Dept: PEDIATRICS | Facility: CLINIC | Age: 68
End: 2018-07-29

## 2018-08-01 ENCOUNTER — OFFICE VISIT (OUTPATIENT)
Dept: PEDIATRICS | Facility: CLINIC | Age: 68
End: 2018-08-01
Payer: COMMERCIAL

## 2018-08-01 VITALS
SYSTOLIC BLOOD PRESSURE: 126 MMHG | HEIGHT: 62 IN | BODY MASS INDEX: 36.4 KG/M2 | OXYGEN SATURATION: 97 % | DIASTOLIC BLOOD PRESSURE: 68 MMHG | TEMPERATURE: 98.5 F | WEIGHT: 197.8 LBS | HEART RATE: 74 BPM

## 2018-08-01 DIAGNOSIS — Z23 NEED FOR PNEUMOCOCCAL VACCINE: ICD-10-CM

## 2018-08-01 DIAGNOSIS — Z87.898 H/O MOTION SICKNESS: Primary | ICD-10-CM

## 2018-08-01 DIAGNOSIS — Z23 NEED FOR VACCINATION: ICD-10-CM

## 2018-08-01 PROCEDURE — 99213 OFFICE O/P EST LOW 20 MIN: CPT | Mod: GE | Performed by: STUDENT IN AN ORGANIZED HEALTH CARE EDUCATION/TRAINING PROGRAM

## 2018-08-01 PROCEDURE — 90732 PPSV23 VACC 2 YRS+ SUBQ/IM: CPT | Performed by: STUDENT IN AN ORGANIZED HEALTH CARE EDUCATION/TRAINING PROGRAM

## 2018-08-01 PROCEDURE — G0009 ADMIN PNEUMOCOCCAL VACCINE: HCPCS | Performed by: STUDENT IN AN ORGANIZED HEALTH CARE EDUCATION/TRAINING PROGRAM

## 2018-08-01 RX ORDER — SCOLOPAMINE TRANSDERMAL SYSTEM 1 MG/1
PATCH, EXTENDED RELEASE TRANSDERMAL
Qty: 4 PATCH | Refills: 11 | Status: SHIPPED | OUTPATIENT
Start: 2018-08-01 | End: 2023-02-06

## 2018-08-01 RX ORDER — SCOLOPAMINE TRANSDERMAL SYSTEM 1 MG/1
1 PATCH, EXTENDED RELEASE TRANSDERMAL
Qty: 10 PATCH | Refills: 3 | Status: SHIPPED | OUTPATIENT
Start: 2018-08-01 | End: 2018-08-01

## 2018-08-01 NOTE — MR AVS SNAPSHOT
"              After Visit Summary   8/1/2018    Angela Story    MRN: [id number]           Patient Information     Date Of Birth          1950        Visit Information        Provider Department      8/1/2018 8:30 AM Brittney Wright MD [facility]        Today's Diagnoses     H/O motion sickness    -  1    Need for pneumococcal vaccine        Need for vaccination           Follow-ups after your visit        Who to contact     If you have questions or need follow up information about today's clinic visit or your schedule please contact [facility] directly at [phone].  Normal or non-critical lab and imaging results will be communicated to you by Hubbl[employer]art, letter or phone within 4 business days after the clinic has received the results. If you do not hear from us within 7 days, please contact the clinic through [employer] or phone. If you have a critical or abnormal lab result, we will notify you by phone as soon as possible.  Submit refill requests through [employer] or call your pharmacy and they will forward the refill request to us. Please allow 3 business days for your refill to be completed.          Additional Information About Your Visit        MyChart Information     [employer] gives you secure access to your electronic health record. If you see a primary care provider, you can also send messages to your care team and make appointments. If you have questions, please call your primary care clinic.  If you do not have a primary care provider, please call [phone] and they will assist you.        Care EveryWhere ID     This is your Care EveryWhere ID. This could be used by other organizations to access your [facility] medical records  [id number]        Your Vitals Were     Pulse Temperature Height Last Period Pulse Oximetry BMI (Body Mass Index)    74 98.5  F (36.9  C) (Oral) 5' 2\" (1.575 m) 07/23/2004 97% 36.18 kg/m2       Blood Pressure from Last 3 Encounters:   08/01/18 126/68 "   03/27/18 (!) 123/97   03/23/18 126/70    Weight from Last 3 Encounters:   08/01/18 197 lb 12.8 oz (89.7 kg)   03/27/18 190 lb (86.2 kg)   03/23/18 191 lb 4.8 oz (86.8 kg)              We Performed the Following     ADMIN MEDICARE: Pneumococcal Vaccine ()     Pneumococcal vaccine 23 valent PPSV23  (Pneumovax) [06053]          Today's Medication Changes          These changes are accurate as of 8/1/18 10:47 AM.  If you have any questions, ask your nurse or doctor.               Start taking these medicines.        Dose/Directions    scopolamine 72 hr patch   Commonly known as:  TRANSDERM   Used for:  H/O motion sickness   Started by:  Brittney Wright MD        Apply 1 patch to hairless area behind one ear at least 4 hours before travel.  Remove old patch and change every 3 days (72 hours).   Quantity:  4 patch   Refills:  11            Where to get your medicines      These medications were sent to [id number] - [place] - [facility]  AT [place] [address] [facility] [place] [address]     Phone:  [phone]     scopolamine 72 hr patch                Primary Care Provider Office Phone # Fax #    Tahira Wallis [phone] [address] [place] [address]        Equal Access to Services     [facility] AH: Hadii johnson barneso Sojerry, waaxda luqadaha, qaybta kaalmanaomi guthrie, yandy boss . So [facility] [phone].    ATENCIÓN: Si habla español, tiene a stevens disposición servicios gratuitos de asistencia lingüística. Luana al [phone].    We comply with applicable federal civil rights laws and [place] laws. We do not discriminate on the basis of race, color, national origin, age, disability, sex, sexual orientation, or gender identity.            Thank you!     Thank you for choosing [facility]  for your care. Our goal is always to provide you with excellent care. Hearing back  from our patients is one way we can continue to improve our services. Please take a few minutes to complete the written survey that you may receive in the mail after your visit with us. Thank you!             Your Updated Medication List - Protect others around you: Learn how to safely use, store and throw away your medicines at www.disposemymeds.org.          This list is accurate as of 8/1/18 10:47 AM.  Always use your most recent med list.                   Brand Name Dispense Instructions for use Diagnosis    Blood Pressure Monitoring Kit     1 kit    1 kit daily    Hypertension goal BP (blood pressure) < 140/90       calcitRIOL 0.25 MCG capsule    ROCALTROL    90 capsule    Take 1 capsule (0.25 mcg) by mouth daily    CKD (chronic kidney disease) stage 3, GFR 30-59 ml/min       cholecalciferol 1000 UNIT tablet    vitamin D3    100 tablet    Take 1 tablet by mouth daily.    Routine general medical examination at a health care facility       citalopram 40 MG tablet    celeXA    90 tablet    Take 1 tablet (40 mg) by mouth daily    Generalized anxiety disorder       * fluticasone 50 MCG/ACT spray    FLONASE    1 Bottle    Spray 1 spray into both nostrils daily    Cough       * fluticasone 50 MCG/ACT spray    FLONASE    1 Bottle    Spray 1-2 sprays into both nostrils daily    Cough       losartan 50 MG tablet    COZAAR    90 tablet    Take 1 tablet (50 mg) by mouth daily    Hypertension goal BP (blood pressure) < 140/90       omeprazole 20 MG CR capsule    priLOSEC    90 capsule    Take 1 capsule (20 mg) by mouth daily    Cough       pravastatin 20 MG tablet    PRAVACHOL    90 tablet    Take 1 tablet (20 mg) by mouth daily    Hyperlipidemia LDL goal <100       scopolamine 72 hr patch    TRANSDERM    4 patch    Apply 1 patch to hairless area behind one ear at least 4 hours before travel.  Remove old patch and change every 3 days (72 hours).    H/O motion sickness       * Notice:  This list has 2 medication(s) that are  the same as other medications prescribed for you. Read the directions carefully, and ask your doctor or other care provider to review them with you.

## 2018-08-01 NOTE — PROGRESS NOTES
SUBJECTIVE:   Angela Story is a 68 year old female who presents to clinic today for the following health issues:    Patient requesting motion patch and pneumonia vaccination. Going on a trip to [place] and a crusie. Worried about motion sickness because she gets it from planes and boats. Has previously used meclizine products without great success.     Needs second pneumonia shot.  She did not have any complications from her first shot.    Today she is feeling well without medical complaint.  Denies chest pain shortness of breath dizziness and weakness.  No recent fevers no sick contacts.     -------------------------------------    Problem list and histories reviewed & adjusted, as indicated.  Additional history: as documented    Patient Active Problem List   Diagnosis     HYPERLIPIDEMIA LDL GOAL <100     CKD (chronic kidney disease) stage 3, GFR 30-59 ml/min     Advanced directives, counseling/discussion     Hypertension goal BP (blood pressure) < 140/90     Esophageal reflux     Right shoulder pain     Osteoarthritis of acromioclavicular joint     Bilateral bunions     Generalized anxiety disorder     Thyroid nodule     IFG (impaired fasting glucose)     Non morbid obesity, unspecified obesity type     Past Surgical History:   Procedure Laterality Date     BUNIONECTOMY  2014    Procedure: BUNIONECTOMY;  Surgeon: Kevin Rodas DPM;  Location: [facility] OR     C  DELIVERY ONLY      , Low Cervical     C NONSPECIFIC PROCEDURE      lumpectomy, lt     C NONSPECIFIC PROCEDURE      ob      C NONSPECIFIC PROCEDURE      Vaginal delivery x 1     EXCISE MASS BACK  2014    Procedure: EXCISE MASS BACK;  Surgeon: Chaim Lacy MD;  Location: [facility] OR     HC ARTHROTOMY W/OPEN MENISCUS REPAIR      lt knee     HC DILATION/CURETTAGE DIAG/THER NON OB      D & C     TONSILLECTOMY      tonsillectomy       Social History   Substance Use Topics     Smoking status: Never Smoker      "Smokeless tobacco: Never Used     Alcohol use Yes      Comment: 1 per week     Family History   Problem Relation Age of Onset     Lipids Mother      Hypertension Mother      Cancer Father      pancreatic     Alcohol/Drug Father      etoh     Other Cancer Father      Depression Father      Substance Abuse Father      HEART DISEASE Maternal Grandfather      Coronary Artery Disease Maternal Grandfather      Cerebrovascular Disease Paternal Grandfather      Psychotic Disorder Sister      bi polar     Neurologic Disorder Sister      ms     Neurologic Disorder Sister      cidp     Cerebrovascular Disease Paternal Grandmother            Reviewed and updated as needed this visit by clinical staff       Reviewed and updated as needed this visit by Provider         ROS:  Constitutional, HEENT, cardiovascular, pulmonary, gi and gu systems are negative, except as otherwise noted.    OBJECTIVE:     /68 (BP Location: Right arm, Patient Position: Chair, Cuff Size: Adult Large)  Pulse 74  Temp 98.5  F (36.9  C) (Oral)  Ht 5' 2\" (1.575 m)  Wt 197 lb 12.8 oz (89.7 kg)  LMP 07/23/2004  SpO2 97%  BMI 36.18 kg/m2  Body mass index is 36.18 kg/(m^2).  GENERAL: healthy, alert and no distress  EYES: Eyes grossly normal to inspection, PERRL and conjunctivae and sclerae normal  RESP: Normal respiratory effort on room air symmetric chest rise.  CV: peripheral pulses strong and no peripheral edema  MS: no gross musculoskeletal defects noted.   NEURO: Speech fluent, gait normal    Diagnostic Test Results:  none     ASSESSMENT/PLAN:       ICD-10-CM    1. H/O motion sickness Z87.898 scopolamine (TRANSDERM) 72 hr patch     DISCONTINUED: scopolamine (TRANSDERM) 72 hr patch   2. Need for pneumococcal vaccine Z23 Pneumococcal vaccine 23 valent PPSV23  (Pneumovax) [05151]     ADMIN MEDICARE: Pneumococcal Vaccine ()   3. Need for vaccination Z23        F/up in clinic as needed.  Advised good hydration with use of scopolamine patch. "     Brittney Wright MD  [facility]      Staff Note:  I discussed this case in depth with Dr. Wright and agree with the key components of the history, assessment and plan.      Jaclyn Gimenez MD  Internal Medicine/Pediatrics

## 2018-08-02 ENCOUNTER — TELEPHONE (OUTPATIENT)
Dept: PEDIATRICS | Facility: CLINIC | Age: 68
End: 2018-08-02

## 2018-08-02 NOTE — TELEPHONE ENCOUNTER
Prior Authorization Approval    Authorization Effective Date: 5/4/2018  Authorization Expiration Date: 8/2/2019  Medication: scopolamine patch  Approved Dose/Quantity: 4 per 12 days  Reference #: [id number]   Insurance Company: [employer] - Phone [phone] Fax [phone]  Which Pharmacy is filling the prescription (Not needed for infusion/clinic administered): [facility] [address] [place] [address] [address] [facility]  AT [facility]  Pharmacy Notified: Yes  Patient Notified: Yes

## 2018-08-02 NOTE — TELEPHONE ENCOUNTER
Prior Authorization Retail Medication Request    Medication/Dose:   ICD code (if different than what is on RX):    Previously Tried and Failed:  meclizine  Rationale:  Needed for motion sickness for travel    Insurance Name:  [facility] [employer]   Insurance ID:  [id number]      Pharmacy Information (if different than what is on RX)  Name:  [facility]   Phone:  [phone]    Adilia Waters LPN

## 2018-08-02 NOTE — TELEPHONE ENCOUNTER
PA Initiation    Medication: scopolamine patch  Insurance Company: [employer] - Phone [phone] Fax [phone]  Pharmacy [facility]illing the Rx[facility] [address] [place] [address]  AT [facility]  Filling Pharmacy Phone: [phone]  Filling Pharmacy Fax:    Start Date: 8/2/2018    Central Prior Authorization Team   Phone: [phone]

## 2018-08-06 ENCOUNTER — TELEPHONE (OUTPATIENT)
Dept: PEDIATRICS | Facility: CLINIC | Age: 68
End: 2018-08-06

## 2018-12-27 NOTE — PROGRESS NOTES
Injectable Influenza Immunization Documentation    1.  Is the person to be vaccinated sick today?  No    2. Does the person to be vaccinated have an allergy to eggs or to a component of the vaccine? YES, allergy to eggs    3. Has the person to be vaccinated today ever had a serious reaction to influenza vaccine in the past? No, first flu vaccine     4. Has the person to be vaccinated ever had Guillain-Cromwell syndrome? No but sister has     Form completed by patient        Patient reports to ED c/c constipation. Pt reports diarrhea tonight, last normal BM was Sunday.

## 2019-01-21 ENCOUNTER — HOSPITAL ENCOUNTER (OUTPATIENT)
Dept: MAMMOGRAPHY | Facility: CLINIC | Age: 69
Discharge: HOME | End: 2019-01-21
Admitting: RADIOLOGY
Payer: COMMERCIAL

## 2019-01-21 ENCOUNTER — OFFICE VISIT (OUTPATIENT)
Dept: FAMILY MEDICINE | Facility: CLINIC | Age: 69
End: 2019-01-21
Payer: COMMERCIAL

## 2019-01-21 VITALS
SYSTOLIC BLOOD PRESSURE: 131 MMHG | HEIGHT: 61 IN | TEMPERATURE: 97.7 F | HEART RATE: 77 BPM | DIASTOLIC BLOOD PRESSURE: 82 MMHG | OXYGEN SATURATION: 95 % | WEIGHT: 186 LBS | BODY MASS INDEX: 35.12 KG/M2

## 2019-01-21 DIAGNOSIS — R79.89 ELEVATED TSH: ICD-10-CM

## 2019-01-21 DIAGNOSIS — I10 HYPERTENSION GOAL BP (BLOOD PRESSURE) < 140/90: ICD-10-CM

## 2019-01-21 DIAGNOSIS — N18.30 CKD (CHRONIC KIDNEY DISEASE) STAGE 3, GFR 30-59 ML/MIN (H): Primary | ICD-10-CM

## 2019-01-21 DIAGNOSIS — Z12.31 VISIT FOR SCREENING MAMMOGRAM: ICD-10-CM

## 2019-01-21 DIAGNOSIS — E66.9 NON MORBID OBESITY, UNSPECIFIED OBESITY TYPE: ICD-10-CM

## 2019-01-21 DIAGNOSIS — E78.5 HYPERLIPIDEMIA LDL GOAL <100: ICD-10-CM

## 2019-01-21 DIAGNOSIS — R73.09 ELEVATED GLUCOSE: ICD-10-CM

## 2019-01-21 DIAGNOSIS — K21.9 GASTROESOPHAGEAL REFLUX DISEASE WITHOUT ESOPHAGITIS: ICD-10-CM

## 2019-01-21 DIAGNOSIS — F41.1 GENERALIZED ANXIETY DISORDER: ICD-10-CM

## 2019-01-21 DIAGNOSIS — Z13.0 SCREENING FOR DEFICIENCY ANEMIA: ICD-10-CM

## 2019-01-21 DIAGNOSIS — Z13.29 SCREENING FOR THYROID DISORDER: ICD-10-CM

## 2019-01-21 DIAGNOSIS — E55.9 VITAMIN D DEFICIENCY: ICD-10-CM

## 2019-01-21 DIAGNOSIS — D17.30 LIPOMA OF SKIN AND SUBCUTANEOUS TISSUE: ICD-10-CM

## 2019-01-21 LAB
ERYTHROCYTE [DISTWIDTH] IN BLOOD BY AUTOMATED COUNT: 12.9 % (ref 10–15)
HBA1C MFR BLD: 5.6 % (ref 0–5.6)
HCT VFR BLD AUTO: 41.5 % (ref 35–47)
HGB BLD-MCNC: 13.7 G/DL (ref 11.7–15.7)
MCH RBC QN AUTO: 30.3 PG (ref 26.5–33)
MCHC RBC AUTO-ENTMCNC: 33 G/DL (ref 31.5–36.5)
MCV RBC AUTO: 92 FL (ref 78–100)
PLATELET # BLD AUTO: 169 10E9/L (ref 150–450)
PTH-INTACT SERPL-MCNC: 37 PG/ML (ref 18–80)
RBC # BLD AUTO: 4.52 10E12/L (ref 3.8–5.2)
WBC # BLD AUTO: 5.8 10E9/L (ref 4–11)

## 2019-01-21 PROCEDURE — 83970 ASSAY OF PARATHORMONE: CPT | Performed by: INTERNAL MEDICINE

## 2019-01-21 PROCEDURE — 77067 SCR MAMMO BI INCL CAD: CPT

## 2019-01-21 PROCEDURE — 80061 LIPID PANEL: CPT | Performed by: INTERNAL MEDICINE

## 2019-01-21 PROCEDURE — 84443 ASSAY THYROID STIM HORMONE: CPT | Performed by: INTERNAL MEDICINE

## 2019-01-21 PROCEDURE — 85027 COMPLETE CBC AUTOMATED: CPT | Performed by: INTERNAL MEDICINE

## 2019-01-21 PROCEDURE — 36415 COLL VENOUS BLD VENIPUNCTURE: CPT | Performed by: INTERNAL MEDICINE

## 2019-01-21 PROCEDURE — 84100 ASSAY OF PHOSPHORUS: CPT | Performed by: INTERNAL MEDICINE

## 2019-01-21 PROCEDURE — 83036 HEMOGLOBIN GLYCOSYLATED A1C: CPT | Performed by: INTERNAL MEDICINE

## 2019-01-21 PROCEDURE — 80053 COMPREHEN METABOLIC PANEL: CPT | Performed by: INTERNAL MEDICINE

## 2019-01-21 PROCEDURE — 99215 OFFICE O/P EST HI 40 MIN: CPT | Performed by: INTERNAL MEDICINE

## 2019-01-21 PROCEDURE — 84439 ASSAY OF FREE THYROXINE: CPT | Performed by: INTERNAL MEDICINE

## 2019-01-21 PROCEDURE — 82306 VITAMIN D 25 HYDROXY: CPT | Performed by: INTERNAL MEDICINE

## 2019-01-21 RX ORDER — LOSARTAN POTASSIUM 50 MG/1
50 TABLET ORAL DAILY
Qty: 90 TABLET | Refills: 3 | Status: SHIPPED | OUTPATIENT
Start: 2019-01-21 | End: 2020-03-23

## 2019-01-21 RX ORDER — CITALOPRAM HYDROBROMIDE 40 MG/1
40 TABLET ORAL DAILY
Qty: 90 TABLET | Refills: 3 | Status: SHIPPED | OUTPATIENT
Start: 2019-01-21 | End: 2020-03-23

## 2019-01-21 RX ORDER — PRAVASTATIN SODIUM 20 MG
20 TABLET ORAL DAILY
Qty: 90 TABLET | Refills: 3 | Status: SHIPPED | OUTPATIENT
Start: 2019-01-21 | End: 2020-03-23

## 2019-01-21 ASSESSMENT — MIFFLIN-ST. JEOR: SCORE: 1325.03

## 2019-01-21 NOTE — PROGRESS NOTES
"  SUBJECTIVE:   Angela Story is a 68 year old female who presents to clinic today for the following health issues:    Pre- Diabetes    Had physical last March  She had pre-diabetic lab results  Was told to come back in 3 months  However, patient came in today to re-check    Problem list and histories reviewed & adjusted, as indicated.  Additional history: as documented    Medications and labs reviewed in EPIC    Reviewed and updated as needed this visit by clinical staff  Tobacco  Allergies  Meds       Reviewed and updated as needed this visit by Provider       ROS:  Constitutional, HEENT, cardiovascular, pulmonary, GI, , musculoskeletal, neuro, skin, endocrine and psych systems are negative, except as otherwise noted.    This document serves as a record of the services and decisions personally performed and made by Merary Geller MD. It was created on her behalf by Earnestine Bonds, a trained medical scribe. The creation of this document is based on the provider's statements to the medical scribe.  Earnestine Bonds 11:16 AM January 21, 2019    OBJECTIVE:     /82 (BP Location: Right arm, Patient Position: Chair, Cuff Size: Adult Large)   Pulse 77   Temp 97.7  F (36.5  C) (Tympanic)   Ht 1.556 m (5' 1.25\")   Wt 84.4 kg (186 lb)   LMP 07/23/2004   SpO2 95%   BMI 34.86 kg/m    Body mass index is 34.86 kg/m .    GENERAL APPEARANCE: overweight, alert and no distress  EYES: Eyes grossly normal to inspection, PERRL and conjunctivae and sclerae normal  HENT: ear canals and TM's normal and nose and mouth without ulcers or lesions  NECK: no adenopathy  RESP: lungs clear to auscultation - no rales, rhonchi or wheezes  CV: regular rates and rhythm, normal S1 S2, no S3  SKIN: lesion consistent with lipoma on lower part of back neck below cervical spine, 4-5 cm in size  PSYCH: mentation appears normal, affect normal/bright    Diagnostic Test Results:  No results found for this or any previous visit (from the " past 24 hour(s)).    Reviewed and discussed labs done on 03/23/18, elevated glucose  Reviewed and discussed mammogram done on 09/20/16  Reviewed and discussed colonoscopy done on 0327/18, recommended follow up in 5 years  Reviewed and discussed DX scan done on 03/06/15  ASSESSMENT/PLAN:     Angela was seen today for pre diabetes.    Diagnoses and all orders for this visit:    CKD (chronic kidney disease) stage 3, GFR 30-59 ml/min (H)  -     Parathyroid Hormone Intact  -     Phosphorus  Stable    Hypertension goal BP (blood pressure) < 140/90  -     Comprehensive metabolic panel  -     losartan (COZAAR) 50 MG tablet; Take 1 tablet (50 mg) by mouth daily  Doing well  Compliant with medication    Gastroesophageal reflux disease without esophagitis  Stable  Uses Prilosec  Last DX done in 2015 was normal    Hyperlipidemia LDL goal <100  -     Lipid panel reflex to direct LDL Fasting  -     Comprehensive metabolic panel  -     pravastatin (PRAVACHOL) 20 MG tablet; Take 1 tablet (20 mg) by mouth daily  Doing well  Compliant with medication    Non morbid obesity, unspecified obesity type  Advised healthy eating and exercise habits    Elevated glucose  -     Hemoglobin A1c  Has history of elevated glucose in 03/18  Re-check today    Elevated TSH  Has history of elevated TSH  Re-check today    Screening for deficiency anemia  -     CBC with platelets    Vitamin D deficiency  -     Vitamin D Deficiency  Advised calcium and Vitamin D supplements daily  Explained that Prilosec can affect absorption    Screening for thyroid disorder  -     TSH with free T4 reflex    Generalized anxiety disorder  -     citalopram (CELEXA) 40 MG tablet; Take 1 tablet (40 mg) by mouth daily  Doing well  Compliant with medication    Visit for screening mammogram  -     *MA Screening Digital Bilateral; Future  Due for mammogram  She will schedule appointment    Lipoma of skin and subcutaneous tissue  Patient has a lipoma below her cervical spine on  the posterior part of her neck  Advised continued monitoring  Advised medical attention if it grows or becomes symptomatic    I spent extensive amount of time collecting medical, past, family, social, immunization, and surgical history    The total visit time was 40 minutes more  than 50% was spent in counseling and coordination of care as discussed above.    Patient Instructions   Labs today  I refilled your prescriptions  Use debrox ear wax removing drops  Mammogram is done in [address]  Please call the following number to make appointment at your earliest convenience:  [phone]  There is a new Shingles vaccine called SHINGRIX  It's is a series of two shots, 2-6 months apart  It is considered more than 90% effective  Please go to our pharmacy located in [address] to get the vaccine  Follow up in  Seek sooner medical attention if there is any worsening of symptoms or problems    The information in this document, created by the medical scribe for me, accurately reflects the services I personally performed and the decisions made by me. I have reviewed and approved this document for accuracy prior to leaving the patient care area.  January 21, 2019 11:36 AM    Merary Geller MD  [facility]

## 2019-01-21 NOTE — PATIENT INSTRUCTIONS
Labs today  I refilled your prescriptions  Use debrox ear wax removing drops  Mammogram is done in [address]  Please call the following number to make appointment at your earliest convenience:  [phone]  There is a new Shingles vaccine called SHINGRIX  It's is a series of two shots, 2-6 months apart  It is considered more than 90% effective  Please go to our pharmacy located in [address] to get the vaccine  Follow up in 3 months  Seek sooner medical attention if there is any worsening of symptoms or problems

## 2019-01-21 NOTE — RESULT ENCOUNTER NOTE
Jessica Miller,    This is to inform you regarding your test result.    Mammogram result is satisfactory .  Recommendation: annual mammography      Sincerely,      Dr.Nasima Yimi MD,FACP

## 2019-01-22 LAB
ALBUMIN SERPL-MCNC: 4.4 G/DL (ref 3.4–5)
ALP SERPL-CCNC: 68 U/L (ref 40–150)
ALT SERPL W P-5'-P-CCNC: 23 U/L (ref 0–50)
ANION GAP SERPL CALCULATED.3IONS-SCNC: 8 MMOL/L (ref 3–14)
AST SERPL W P-5'-P-CCNC: 23 U/L (ref 0–45)
BILIRUB SERPL-MCNC: 0.7 MG/DL (ref 0.2–1.3)
BUN SERPL-MCNC: 12 MG/DL (ref 7–30)
CALCIUM SERPL-MCNC: 10 MG/DL (ref 8.5–10.1)
CHLORIDE SERPL-SCNC: 104 MMOL/L (ref 94–109)
CHOLEST SERPL-MCNC: 167 MG/DL
CO2 SERPL-SCNC: 26 MMOL/L (ref 20–32)
CREAT SERPL-MCNC: 1.01 MG/DL (ref 0.52–1.04)
DEPRECATED CALCIDIOL+CALCIFEROL SERPL-MC: 42 UG/L (ref 20–75)
GFR SERPL CREATININE-BSD FRML MDRD: 58 ML/MIN/{1.73_M2}
GLUCOSE SERPL-MCNC: 96 MG/DL (ref 70–99)
HDLC SERPL-MCNC: 60 MG/DL
LDLC SERPL CALC-MCNC: 80 MG/DL
NONHDLC SERPL-MCNC: 107 MG/DL
PHOSPHATE SERPL-MCNC: 3.5 MG/DL (ref 2.5–4.5)
POTASSIUM SERPL-SCNC: 4.2 MMOL/L (ref 3.4–5.3)
PROT SERPL-MCNC: 7.5 G/DL (ref 6.8–8.8)
SODIUM SERPL-SCNC: 138 MMOL/L (ref 133–144)
T4 FREE SERPL-MCNC: 0.84 NG/DL (ref 0.76–1.46)
TRIGL SERPL-MCNC: 134 MG/DL
TSH SERPL DL<=0.005 MIU/L-ACNC: 4.64 MU/L (ref 0.4–4)

## 2019-01-23 DIAGNOSIS — R79.89 ELEVATED TSH: Primary | ICD-10-CM

## 2019-01-23 DIAGNOSIS — E04.1 THYROID NODULE: ICD-10-CM

## 2019-01-23 NOTE — RESULT ENCOUNTER NOTE
Jessica Miller,    This is to inform you regarding your test result.    Vitamin D level is normal.  TSH which is thyroid hormone is slightly elevated but free T 4 level is normal  You do not need medication at this point.  But we need to check your level again in 3 months  Your total cholesterol is normal.  HDL which is called good cholesterol is normal.  Your LDL cholesterol is normal.  This is often call bad cholesterol and high levels increase the risk for heart attacks and strokes.  Your triglycerides are normal.  The testing of your kidney function, liver function and electrolytes was satisfactory   Phosphorus was normal.  Parathyroid hormone level is normal.  CBC result which includes white count Hemoglobin and  Platelet Counts is normal.   HbA1c which is average glucose during last 3 months is normal.      Sincerely,      Dr.Nasima Yimi MD,FACP

## 2019-03-12 ENCOUNTER — OFFICE VISIT (OUTPATIENT)
Dept: URGENT CARE | Facility: URGENT CARE | Age: 69
End: 2019-03-12
Payer: COMMERCIAL

## 2019-03-12 VITALS — DIASTOLIC BLOOD PRESSURE: 74 MMHG | HEART RATE: 67 BPM | TEMPERATURE: 98.2 F | SYSTOLIC BLOOD PRESSURE: 136 MMHG

## 2019-03-12 DIAGNOSIS — J06.9 VIRAL URI: Primary | ICD-10-CM

## 2019-03-12 DIAGNOSIS — R07.0 THROAT PAIN: ICD-10-CM

## 2019-03-12 LAB
DEPRECATED S PYO AG THROAT QL EIA: NORMAL
SPECIMEN SOURCE: NORMAL

## 2019-03-12 PROCEDURE — 99213 OFFICE O/P EST LOW 20 MIN: CPT | Performed by: PHYSICIAN ASSISTANT

## 2019-03-12 PROCEDURE — 87081 CULTURE SCREEN ONLY: CPT | Performed by: PHYSICIAN ASSISTANT

## 2019-03-12 PROCEDURE — 87880 STREP A ASSAY W/OPTIC: CPT | Performed by: PHYSICIAN ASSISTANT

## 2019-03-12 ASSESSMENT — ENCOUNTER SYMPTOMS
ABDOMINAL PAIN: 0
VOMITING: 0
COUGH: 1
HEADACHES: 0
SHORTNESS OF BREATH: 0
FEVER: 0
SORE THROAT: 1
NAUSEA: 0
FOCAL WEAKNESS: 0
DIARRHEA: 0
CHILLS: 0

## 2019-03-12 NOTE — PROGRESS NOTES
HPI  2019    HPI: Angela Story is a 68 year old female who complains of moderate sore throat, postnasal drip, cough, & congestion onset 10 days ago. Symptoms are constant in duration. No treatments tried. Denies fever/chills, HA, CP, SOB, abd pain, N/V/D, rash, or any other symptoms. Patient's grandchildren have been sick with colds.    Past Medical History:   Diagnosis Date     Bilateral bunions 2014     CKD (chronic kidney disease) stage 3, GFR 30-59 ml/min (H)      Gastro-oesophageal reflux disease      Hyperlipidemia LDL goal <100 2010     Hypertension goal BP (blood pressure) < 140/90 3/7/2013     IFG (impaired fasting glucose) 2016     PONV (postoperative nausea and vomiting)      Thyroid nodule 2015     Past Surgical History:   Procedure Laterality Date     BUNIONECTOMY  2014    Procedure: BUNIONECTOMY;  Surgeon: Kevin Rodas DPM;  Location: [facility] OR     C  DELIVERY ONLY      , Low Cervical     C NONSPECIFIC PROCEDURE      lumpectomy, lt     C NONSPECIFIC PROCEDURE      ob      C NONSPECIFIC PROCEDURE      Vaginal delivery x 1     EXCISE MASS BACK  2014    Procedure: EXCISE MASS BACK;  Surgeon: Chaim Lacy MD;  Location: [facility] OR     HC ARTHROTOMY W/OPEN MENISCUS REPAIR      lt knee     HC DILATION/CURETTAGE DIAG/THER NON OB      D & C     TONSILLECTOMY      tonsillectomy     Social History     Tobacco Use     Smoking status: Never Smoker     Smokeless tobacco: Never Used   Substance Use Topics     Alcohol use: Yes     Comment: 1 per week     Drug use: No     Patient Active Problem List   Diagnosis     HYPERLIPIDEMIA LDL GOAL <100     CKD (chronic kidney disease) stage 3, GFR 30-59 ml/min (H)     Advanced directives, counseling/discussion     Hypertension goal BP (blood pressure) < 140/90     Esophageal reflux     Right shoulder pain     Osteoarthritis of acromioclavicular joint     Bilateral bunions     Generalized anxiety  disorder     Thyroid nodule     IFG (impaired fasting glucose)     Non morbid obesity, unspecified obesity type     Elevated TSH     Family History   Problem Relation Age of Onset     Lipids Mother      Hypertension Mother      Cancer Father         pancreatic     Alcohol/Drug Father         etoh     Other Cancer Father      Depression Father      Substance Abuse Father      Heart Disease Maternal Grandfather      Coronary Artery Disease Maternal Grandfather      Cerebrovascular Disease Paternal Grandfather      Psychotic Disorder Sister         bi polar     Neurologic Disorder Sister         ms     Neurologic Disorder Sister         cidp     Cerebrovascular Disease Paternal Grandmother         Problem list, Medication list, Allergies, and Medical/Social/Surgical histories reviewed in [facility] and updated as appropriate.      Review of Systems   Constitutional: Negative for chills and fever.   HENT: Positive for congestion and sore throat.         Postnasal drip   Respiratory: Positive for cough. Negative for shortness of breath.    Cardiovascular: Negative for chest pain.   Gastrointestinal: Negative for abdominal pain, diarrhea, nausea and vomiting.   Skin: Negative for rash.   Neurological: Negative for focal weakness and headaches.   All other systems reviewed and are negative.        Physical Exam   Constitutional: She is oriented to person, place, and time.   HENT:   Head: Normocephalic and atraumatic.   Right Ear: Tympanic membrane and external ear normal.   Left Ear: Tympanic membrane and external ear normal.   Nose: Nose normal.   Mouth/Throat: Uvula is midline. Posterior oropharyngeal erythema present. No oropharyngeal exudate, posterior oropharyngeal edema or tonsillar abscesses.   Cardiovascular: Normal rate, regular rhythm and normal heart sounds.   Pulmonary/Chest: Effort normal and breath sounds normal.   Musculoskeletal: Normal range of motion.   Neurological: She is alert and oriented to person, place,  and time.   Skin: Skin is warm and dry.   Nursing note and vitals reviewed.    Vital Signs  /74   Pulse 67   Temp 98.2  F (36.8  C) (Oral)   LMP 07/23/2004      Diagnostic Test Results:  Results for orders placed or performed in visit on 03/12/19 (from the past 24 hour(s))   Strep, Rapid Screen   Result Value Ref Range    Specimen Description Throat     Rapid Strep A Screen       NEGATIVE: No Group A streptococcal antigen detected by immunoassay, await culture report.       ASSESSMENT/PLAN      ICD-10-CM    1. Viral URI J06.9    2. Throat pain R07.0 Strep, Rapid Screen     Beta strep group A culture      Strep negative. C/w viral URI- supportive treatments discussed.      I have discussed any lab or imaging results, the patient's diagnosis, and my plan of treatment with the patient and/or family. Patient is aware to come back in if with worsening symptoms or if no relief despite treatment plan.  Patient voiced understanding and had no further questions.       Follow Up: Return in about 1 week (around 3/19/2019) for Follow up w/ PCP if not better.    NATHANIEL Ashley, PA-C  [facility]

## 2019-03-13 LAB
BACTERIA SPEC CULT: NORMAL
SPECIMEN SOURCE: NORMAL

## 2019-04-22 ENCOUNTER — OFFICE VISIT (OUTPATIENT)
Dept: FAMILY MEDICINE | Facility: CLINIC | Age: 69
End: 2019-04-22
Payer: COMMERCIAL

## 2019-04-22 VITALS
HEIGHT: 61 IN | BODY MASS INDEX: 32.1 KG/M2 | SYSTOLIC BLOOD PRESSURE: 119 MMHG | DIASTOLIC BLOOD PRESSURE: 69 MMHG | OXYGEN SATURATION: 98 % | HEART RATE: 58 BPM | WEIGHT: 170 LBS | TEMPERATURE: 99 F

## 2019-04-22 DIAGNOSIS — R05.9 COUGH: ICD-10-CM

## 2019-04-22 DIAGNOSIS — Z00.00 ROUTINE HISTORY AND PHYSICAL EXAMINATION OF ADULT: Primary | ICD-10-CM

## 2019-04-22 DIAGNOSIS — E04.1 THYROID NODULE: ICD-10-CM

## 2019-04-22 DIAGNOSIS — R79.89 ELEVATED TSH: Primary | ICD-10-CM

## 2019-04-22 DIAGNOSIS — N18.30 CKD (CHRONIC KIDNEY DISEASE) STAGE 3, GFR 30-59 ML/MIN (H): ICD-10-CM

## 2019-04-22 LAB
CREAT UR-MCNC: 113 MG/DL
MICROALBUMIN UR-MCNC: 5 MG/L
MICROALBUMIN/CREAT UR: 4.84 MG/G CR (ref 0–25)
T4 FREE SERPL-MCNC: 0.9 NG/DL (ref 0.76–1.46)
TSH SERPL DL<=0.005 MIU/L-ACNC: 4.67 MU/L (ref 0.4–4)

## 2019-04-22 PROCEDURE — 86376 MICROSOMAL ANTIBODY EACH: CPT | Performed by: INTERNAL MEDICINE

## 2019-04-22 PROCEDURE — 84443 ASSAY THYROID STIM HORMONE: CPT | Performed by: INTERNAL MEDICINE

## 2019-04-22 PROCEDURE — 36415 COLL VENOUS BLD VENIPUNCTURE: CPT | Performed by: INTERNAL MEDICINE

## 2019-04-22 PROCEDURE — 82043 UR ALBUMIN QUANTITATIVE: CPT | Performed by: INTERNAL MEDICINE

## 2019-04-22 PROCEDURE — G0438 PPPS, INITIAL VISIT: HCPCS | Performed by: INTERNAL MEDICINE

## 2019-04-22 PROCEDURE — 84439 ASSAY OF FREE THYROXINE: CPT | Performed by: INTERNAL MEDICINE

## 2019-04-22 PROCEDURE — 99212 OFFICE O/P EST SF 10 MIN: CPT | Mod: 25 | Performed by: INTERNAL MEDICINE

## 2019-04-22 RX ORDER — CALCITRIOL 0.25 UG/1
0.25 CAPSULE, LIQUID FILLED ORAL DAILY
Qty: 90 CAPSULE | Refills: 3 | Status: SHIPPED | OUTPATIENT
Start: 2019-04-22 | End: 2020-06-11

## 2019-04-22 ASSESSMENT — ACTIVITIES OF DAILY LIVING (ADL): CURRENT_FUNCTION: NO ASSISTANCE NEEDED

## 2019-04-22 ASSESSMENT — MIFFLIN-ST. JEOR: SCORE: 1248.49

## 2019-04-22 NOTE — PATIENT INSTRUCTIONS
Patient Education   Personalized Prevention Plan  You are due for the preventive services outlined below.  Your care team is available to assist you in scheduling these services.  If you have already completed any of these items, please share that information with your care team to update in your medical record.  Health Maintenance Due   Topic Date Due     Microalbumin Lab - yearly  03/23/2019     Annual Wellness Visit  03/23/2019         Your TSH levels in January were slightly elevated.   We will repeat labs today.   I refilled your prescriptions.  Let me know if your grand-daughter tests positive for strep or if you develop any symptoms.   Follow up in 6 months  Seek sooner medical attention if there is any worsening of symptoms or problems.

## 2019-04-22 NOTE — PROGRESS NOTES
"SUBJECTIVE:   Angela Story is a 68 year old female who presents for Preventive Visit.    Are you in the first 12 months of your Medicare coverage?  No    Healthy Habits:     In general, how would you rate your overall health?  Very good    Frequency of exercise:  4-5 days/week    Duration of exercise:: 60 on average.    Do you usually eat at least 4 servings of fruit and vegetables a day, include whole grains    & fiber and avoid regularly eating high fat or \"junk\" foods?  Yes    Taking medications regularly:  No    Barriers to taking medications:  None    Medication side effects:  None    Ability to successfully perform activities of daily living:  No assistance needed    Home Safety:  No safety concerns identified    Hearing Impairment:  No hearing concerns    In the past 6 months, have you been bothered by leaking of urine?  No    In general, how would you rate your overall mental or emotional health?  Excellent      PHQ-2 Total Score: 0    Additional concerns today:  No    Do you feel safe in your environment? YES    Do you have a Health Care Directive? Yes: Advance Directive has been received and scanned.    Fall risk  Fallen 2 or more times in the past year?: No  Any fall with injury in the past year?: No    Cognitive Screening   1) Repeat 3 items (Leader, Season, Table)    2) Clock draw: NORMAL  3) 3 item recall: Recalls 3 objects  Results: 3 items recalled: COGNITIVE IMPAIRMENT LESS LIKELY    Mini-CogTM Copyright S Yolanda. Licensed by the author for use in [facility]; reprinted with permission (fifi@.[facility]). All rights reserved.      Do you have sleep apnea, excessive snoring or daytime drowsiness?: no     Breast discomfort  She will experience an occasional pain/ache in her breast.   She had a mass removed from this same breast years ago.   She wears supportive bras.       Her grand-daughter is going to be tested for strep today.   Patient has no symptoms.     Reviewed and updated as needed " this visit by clinical staff  Tobacco  Allergies  Meds         Reviewed and updated as needed this visit by Provider        Social History     Tobacco Use     Smoking status: Never Smoker     Smokeless tobacco: Never Used   Substance Use Topics     Alcohol use: Yes     Comment: 1 per week     If you drink alcohol do you typically have >3 drinks per day or >7 drinks per week? No    Alcohol Use 4/22/2019   Prescreen: >3 drinks/day or >7 drinks/week? -   Prescreen: >3 drinks/day or >7 drinks/week? No     Current providers sharing in care for this patient include:   Patient Care Team:  Merary Geller MD as PCP - General (Internal Medicine)  Satish Scott as Other (see comments) (Therapist)  Merary Geller MD as Assigned PCP    The following health maintenance items are reviewed in Epic and correct as of today:  Health Maintenance   Topic Date Due     MICROALBUMIN Q1 YEAR  03/23/2019     MEDICARE ANNUAL WELLNESS VISIT  03/23/2019     TSH Q1 YEAR  01/21/2020     BMP Q1 YR  01/21/2020     HEMOGLOBIN Q1 YR  01/21/2020     LIPID MONITORING Q1 YEAR  01/21/2020     FALL RISK ASSESSMENT  04/22/2020     MAMMO Q2 YR  01/21/2021     ADVANCE DIRECTIVE PLANNING Q5 YRS  12/07/2021     DTAP/TDAP/TD IMMUNIZATION (3 - Td) 07/24/2027     COLON CANCER SCREEN (SYSTEM ASSIGNED)  03/27/2028     DEXA SCAN SCREENING (SYSTEM ASSIGNED)  Completed     PHQ-2  Completed     INFLUENZA VACCINE  Completed     ZOSTER IMMUNIZATION  Completed     HEPATITIS C SCREENING  Completed     IPV IMMUNIZATION  Aged Out     MENINGITIS IMMUNIZATION  Aged Out     Labs reviewed in EPIC  Patient Active Problem List   Diagnosis     HYPERLIPIDEMIA LDL GOAL <100     CKD (chronic kidney disease) stage 3, GFR 30-59 ml/min (H)     Advanced directives, counseling/discussion     Hypertension goal BP (blood pressure) < 140/90     Esophageal reflux     Right shoulder pain     Osteoarthritis of acromioclavicular joint     Bilateral bunions     Generalized anxiety  disorder     Thyroid nodule     IFG (impaired fasting glucose)     Non morbid obesity, unspecified obesity type     Elevated TSH     Past Surgical History:   Procedure Laterality Date     BUNIONECTOMY  2014    Procedure: BUNIONECTOMY;  Surgeon: Kevin Rodas DPM;  Location: [facility] OR     C  DELIVERY ONLY      , Low Cervical     C NONSPECIFIC PROCEDURE      lumpectomy, lt     C NONSPECIFIC PROCEDURE      ob      C NONSPECIFIC PROCEDURE      Vaginal delivery x 1     EXCISE MASS BACK  2014    Procedure: EXCISE MASS BACK;  Surgeon: Chaim Lacy MD;  Location: [facility] OR     HC ARTHROTOMY W/OPEN MENISCUS REPAIR      lt knee     HC DILATION/CURETTAGE DIAG/THER NON OB      D & C     TONSILLECTOMY      tonsillectomy       Social History     Tobacco Use     Smoking status: Never Smoker     Smokeless tobacco: Never Used   Substance Use Topics     Alcohol use: Yes     Comment: 1 per week     Family History   Problem Relation Age of Onset     Lipids Mother      Hypertension Mother      Cancer Father         pancreatic     Alcohol/Drug Father         etoh     Other Cancer Father      Depression Father      Substance Abuse Father      Heart Disease Maternal Grandfather      Coronary Artery Disease Maternal Grandfather      Cerebrovascular Disease Paternal Grandfather      Psychotic Disorder Sister         bi polar     Neurologic Disorder Sister         ms     Neurologic Disorder Sister         cidp     Cerebrovascular Disease Paternal Grandmother          Current Outpatient Medications   Medication Sig Dispense Refill     Blood Pressure Monitoring KIT 1 kit daily 1 kit 0     calcitRIOL (ROCALTROL) 0.25 MCG capsule Take 1 capsule (0.25 mcg) by mouth daily 90 capsule 3     cholecalciferol (VITAMIN D) 1000 UNIT tablet Take 1 tablet by mouth daily. 100 tablet 12     citalopram (CELEXA) 40 MG tablet Take 1 tablet (40 mg) by mouth daily 90 tablet 3     losartan (COZAAR) 50 MG tablet  Take 1 tablet (50 mg) by mouth daily 90 tablet 3     omeprazole (PRILOSEC) 20 MG DR capsule Take 1 capsule (20 mg) by mouth daily as needed 90 capsule 3     pravastatin (PRAVACHOL) 20 MG tablet Take 1 tablet (20 mg) by mouth daily 90 tablet 3     scopolamine (TRANSDERM) 72 hr patch Apply 1 patch to hairless area behind one ear at least 4 hours before travel.  Remove old patch and change every 3 days (72 hours). (Patient taking differently: Place 1 patch onto the skin as needed Apply 1 patch to hairless area behind one ear at least 4 hours before travel.  Remove old patch and change every 3 days (72 hours).) 4 patch 11     Allergies   Allergen Reactions     Norco [Hydrocodone-Acetaminophen] Nausea and Vomiting     Dizzy     Eggs Nausea and Vomiting and Diarrhea     Lisinopril Cough     Mucinex Hives     Penicillins Rash     Sulfa Drugs Hives     Sulfites Nausea and Vomiting and Diarrhea     Mammogram Screening: Mammogram Screening: Patient over age 50, mutual decision to screen reflected in health maintenance.    Review of Systems  CONSTITUTIONAL: NEGATIVE for fever, chills, change in weight  INTEGUMENTARY/SKIN: NEGATIVE for worrisome rashes, moles or lesions  EYES: NEGATIVE for vision changes or irritation  ENT/MOUTH: NEGATIVE for ear, mouth and throat problems  RESP: NEGATIVE for significant cough or SOB  BREAST: NEGATIVE for masses, tenderness or discharge  CV: NEGATIVE for chest pain, palpitations or peripheral edema  GI: NEGATIVE for nausea, abdominal pain, heartburn, or change in bowel habits  : NEGATIVE for frequency, dysuria, or hematuria  MUSCULOSKELETAL: NEGATIVE for significant arthralgias or myalgia  NEURO: NEGATIVE for weakness, dizziness or paresthesias  ENDOCRINE: NEGATIVE for temperature intolerance, skin/hair changes  HEME: NEGATIVE for bleeding problems  PSYCHIATRIC: NEGATIVE for changes in mood or affect    This document serves as a record of the services and decisions personally performed and  "made by Merary Geller MD. It was created on his behalf by Erin Schaffer, a trained medical scribe. The creation of this document is based on the provider's statements to the medical scribe.  Erin Schaffer April 22, 2019 9:07 AM    OBJECTIVE:   /69 (BP Location: Right arm, Patient Position: Sitting, Cuff Size: Adult Large)   Pulse 58   Temp 99  F (37.2  C) (Oral)   Ht 1.549 m (5' 1\")   Wt 77.1 kg (170 lb)   LMP 07/23/2004   SpO2 98%   Breastfeeding? No   BMI 32.12 kg/m   Estimated body mass index is 32.12 kg/m  as calculated from the following:    Height as of this encounter: 1.549 m (5' 1\").    Weight as of this encounter: 77.1 kg (170 lb).     Physical Exam  GENERAL APPEARANCE: healthy, alert and no distress  EYES: Eyes grossly normal to inspection, PERRL and conjunctivae and sclerae normal  HENT: ear canals and TM's normal, nose and mouth without ulcers or lesions, oropharynx clear and oral mucous membranes moist  NECK: no adenopathy,   RESP: lungs clear to auscultation - no rales, rhonchi or wheezes  BREAST: normal without masses, tenderness or nipple discharge and no palpable axillary masses or adenopathy  CV: regular rate and rhythm, normal S1 S2, no S3 or S4, no murmur, click or rub, no peripheral edema and peripheral pulses strong  ABDOMEN: soft, nontender, no hepatosplenomegaly, no masses and bowel sounds normal  : Pelvic exam not performed.   MS: Slight leg edema bilaterally and spider veins on left leg. Otherwise, no musculoskeletal defects are noted and gait is age appropriate without ataxia  SKIN: no suspicious lesions or rashes  NEURO: Normal strength and tone, sensory exam grossly normal, mentation intact and speech normal  PSYCH: mentation appears normal and affect normal/bright    Diagnostic Test Results:  Results for orders placed or performed in visit on 04/22/19 (from the past 24 hour(s))   TSH with free T4 reflex   Result Value Ref Range    TSH 4.67 (H) 0.40 - 4.00 mU/L   T4 " free   Result Value Ref Range    T4 Free 0.90 0.76 - 1.46 ng/dL       Reviewed labs from 1/21/2019--TSH levels were high.   ASSESSMENT / PLAN:   Angela was seen today for physical.    Diagnoses and all orders for this visit:    Routine history and physical examination of adult  Up to date on immunizations.   Up to date on mammogram.   Up to date on colonoscopy.  Due for repeat 2023.   Hx of abnormal pap smear 30 years ago.   She last had a pap smear 12/31/2015--normal.  She is no longer due for pap smears because of age.   She did report last Pap smear was no more than 1-2 year ago    CKD (chronic kidney disease) stage 3, GFR 30-59 ml/min (H)  Stable.   -     Albumin Random Urine Quantitative with Creat Ratio  -     calcitRIOL (ROCALTROL) 0.25 MCG capsule; Take 1 capsule (0.25 mcg) by mouth daily    Cough  Stable.   Takes medication prn.   -     omeprazole (PRILOSEC) 20 MG DR capsule; Take 1 capsule (20 mg) by mouth daily as needed    Thyroid nodule  Patient has a personal hx of thyroid nodule.   She had 3 imagings performed 6 months apart with no change.   No family hx of thyroid disease.   Three years ago her thyroid levels were slightly abnormal but have been fine since.   TSH levels 1/21/2019 were slightly above normal.   Repeating labs today.   -     Thyroid peroxidase antibody  -     TSH with free T4 reflex    Congratulated patient on her intentional weight loss.   She is in [employer].     Patient Instructions       Patient Education   Personalized Prevention Plan  You are due for the preventive services outlined below.  Your care team is available to assist you in scheduling these services.  If you have already completed any of these items, please share that information with your care team to update in your medical record.  Health Maintenance Due   Topic Date Due     Microalbumin Lab - yearly  03/23/2019     Annual Wellness Visit  03/23/2019         Your TSH levels in January were slightly elevated.  "  We will repeat labs today.   I refilled your prescriptions.  Let me know if your grand-daughter tests positive for strep or if you develop any symptoms.   Follow up in 6 months  Seek sooner medical attention if there is any worsening of symptoms or problems.            End of Life Planning:  Patient currently has an advanced directive: Yes.  Practitioner is supportive of decision.    COUNSELING:  Reviewed preventive health counseling, as reflected in patient instructions       Regular exercise       Healthy diet/nutrition    BP Readings from Last 1 Encounters:   04/22/19 119/69     Estimated body mass index is 32.12 kg/m  as calculated from the following:    Height as of this encounter: 1.549 m (5' 1\").    Weight as of this encounter: 77.1 kg (170 lb).  Weight management plan: Discussed healthy diet and exercise guidelines   reports that she has never smoked. She has never used smokeless tobacco.    Appropriate preventive services were discussed with this patient, including applicable screening as appropriate for cardiovascular disease, diabetes, osteopenia/osteoporosis, and glaucoma.  As appropriate for age/gender, discussed screening for colorectal cancer, prostate cancer, breast cancer, and cervical cancer. Checklist reviewing preventive services available has been given to the patient.    Reviewed patients plan of care and provided an AVS. The Basic Care Plan (routine screening as documented in Health Maintenance) for Angela meets the Care Plan requirement. This Care Plan has been established and reviewed with the Patient.    Counseling Resources:  ATP IV Guidelines  Pooled Cohorts Equation Calculator  Breast Cancer Risk Calculator  FRAX Risk Assessment  ICSI Preventive Guidelines  Dietary Guidelines for Americans, 2010  USDA's MyPlate  ASA Prophylaxis  Lung CA Screening    The information in this document, created by the medical scribe for me, accurately reflects the services I personally performed and the " decisions made by me. I have reviewed and approved this document for accuracy prior to leaving the patient care area.  April 22, 2019 9:22 AM    Merary Geller MD  [facility]    Identified Health Risks:

## 2019-04-23 DIAGNOSIS — E04.1 THYROID NODULE: ICD-10-CM

## 2019-04-23 DIAGNOSIS — R76.8 ANTI-TPO ANTIBODIES PRESENT: Primary | ICD-10-CM

## 2019-04-23 LAB — THYROPEROXIDASE AB SERPL-ACNC: 69 IU/ML

## 2019-04-23 NOTE — RESULT ENCOUNTER NOTE
Jessica Miller,    This is to inform you regarding your test result.    I spoke to you on phone but sending you a result note also.  Your thyroid peroxidase antibody is positive  That means you are at risk of developing underactive thyroid called hypothyroidism and would need medications in future.  Will have low threshold to start you on medication  At this point your TSH is only slightly elevated and free T4 is normal  I would like to recheck your TSH in 2 months  Please make lab only appointment  We will continue to monitor your TSH every 4 to 6 months  And if TSH continues to go on higher side we would consider starting you on medication    Sincerely,      Dr.Nasima Yimi MD,FACP

## 2019-04-23 NOTE — RESULT ENCOUNTER NOTE
Jessica Miller,    This is to inform you regarding your test result.      Urine for Microalbumin is normal.  TSH which is thyroid hormone is elevated but free T 4 level is normal  You do not need medication at this point.  But we need to check your level again in 4-6 months    Sincerely,      Dr.Nasima Yimi MD,FACP

## 2019-06-24 DIAGNOSIS — E04.1 THYROID NODULE: ICD-10-CM

## 2019-06-24 LAB
T4 FREE SERPL-MCNC: 0.83 NG/DL (ref 0.76–1.46)
TSH SERPL DL<=0.005 MIU/L-ACNC: 5.64 MU/L (ref 0.4–4)

## 2019-06-24 PROCEDURE — 84443 ASSAY THYROID STIM HORMONE: CPT | Performed by: INTERNAL MEDICINE

## 2019-06-24 PROCEDURE — 84439 ASSAY OF FREE THYROXINE: CPT | Performed by: INTERNAL MEDICINE

## 2019-06-24 PROCEDURE — 36415 COLL VENOUS BLD VENIPUNCTURE: CPT | Performed by: INTERNAL MEDICINE

## 2019-06-25 DIAGNOSIS — E03.9 HYPOTHYROIDISM, UNSPECIFIED TYPE: Primary | ICD-10-CM

## 2019-06-25 DIAGNOSIS — R76.8 ANTI-TPO ANTIBODIES PRESENT: ICD-10-CM

## 2019-06-25 DIAGNOSIS — E06.3 HYPOTHYROIDISM DUE TO HASHIMOTO'S THYROIDITIS: Primary | ICD-10-CM

## 2019-06-25 RX ORDER — LEVOTHYROXINE SODIUM 50 UG/1
50 TABLET ORAL DAILY
Qty: 90 TABLET | Refills: 0 | Status: SHIPPED | OUTPATIENT
Start: 2019-06-25 | End: 2019-08-29

## 2019-06-25 NOTE — RESULT ENCOUNTER NOTE
Jessica Miller,    This is to inform you regarding your test result.    I spoke to you on phone but sending you a result note also.  Your TSH is slowly going up  Your TPO antibodies were positive previously  Per our discussion we will start you on a low-dose levothyroxine  Start taking levothyroxine 50 mcg once daily  You should take first thing in the morning with empty stomach as it is absorbed better with empty stomach.  Recheck your TSH in 2 months  Make lab only appointment  Your prescription is sent to the [facility] in [place]      Sincerely,      Dr.Nasima Yimi MD,FACP

## 2019-07-10 ENCOUNTER — TRANSFERRED RECORDS (OUTPATIENT)
Dept: HEALTH INFORMATION MANAGEMENT | Facility: CLINIC | Age: 69
End: 2019-07-10

## 2019-08-29 DIAGNOSIS — E06.3 HYPOTHYROIDISM DUE TO HASHIMOTO'S THYROIDITIS: ICD-10-CM

## 2019-08-29 DIAGNOSIS — R76.8 ANTI-TPO ANTIBODIES PRESENT: ICD-10-CM

## 2019-08-29 LAB — TSH SERPL DL<=0.005 MIU/L-ACNC: 1.3 MU/L (ref 0.4–4)

## 2019-08-29 PROCEDURE — 36415 COLL VENOUS BLD VENIPUNCTURE: CPT | Performed by: INTERNAL MEDICINE

## 2019-08-29 PROCEDURE — 84443 ASSAY THYROID STIM HORMONE: CPT | Performed by: INTERNAL MEDICINE

## 2019-08-29 RX ORDER — LEVOTHYROXINE SODIUM 50 UG/1
50 TABLET ORAL DAILY
Qty: 90 TABLET | Refills: 1 | Status: SHIPPED | OUTPATIENT
Start: 2019-08-29 | End: 2019-09-03

## 2019-08-30 NOTE — RESULT ENCOUNTER NOTE
Jessica Miller,    This is to inform you regarding your test result.    TSH which is thyroid hormone is normal.  Stay on current dose of levothyroxine.  Recheck TSH in 6 months      Sincerely,      Dr.Nasima Yimi MD,FACP

## 2019-10-04 ENCOUNTER — HEALTH MAINTENANCE LETTER (OUTPATIENT)
Age: 69
End: 2019-10-04

## 2019-11-18 ENCOUNTER — OFFICE VISIT (OUTPATIENT)
Dept: FAMILY MEDICINE | Facility: CLINIC | Age: 69
End: 2019-11-18
Payer: COMMERCIAL

## 2019-11-18 VITALS
OXYGEN SATURATION: 99 % | BODY MASS INDEX: 36.28 KG/M2 | SYSTOLIC BLOOD PRESSURE: 136 MMHG | DIASTOLIC BLOOD PRESSURE: 74 MMHG | TEMPERATURE: 98.9 F | WEIGHT: 192 LBS | HEART RATE: 76 BPM

## 2019-11-18 DIAGNOSIS — B34.9 VIRAL INFECTION: Primary | ICD-10-CM

## 2019-11-18 PROBLEM — E66.01 MORBID OBESITY (H): Status: ACTIVE | Noted: 2019-11-18

## 2019-11-18 PROCEDURE — 99213 OFFICE O/P EST LOW 20 MIN: CPT | Performed by: NURSE PRACTITIONER

## 2019-11-18 NOTE — PROGRESS NOTES
Subjective     Angela Story is a 69 year old female who presents to clinic today for the following health issues:    HPI   Eye(s) Problem      Duration: 1 week ago developed bilateral upper and lower eye swelling after having had a cold for about 7 days.  BUt this morning they were fine.  Still has mild sinus congestion and somewhat fatigued, but no cough or SOB, no mucopurulent sinus drainage    Description:  Location: bilateral  Pain: no  Redness: no  Discharge: no    Accompanying signs and symptoms: eyelids have been very puffy, sometimes itchy    History (Trauma, foreign body exposure,): None    Precipitating or alleviating factors (contact use): taking care of multiple sick grandchildren    Therapies tried and outcome: cucumber, black tea bags     Patient Active Problem List   Diagnosis     HYPERLIPIDEMIA LDL GOAL <100     CKD (chronic kidney disease) stage 3, GFR 30-59 ml/min (H)     Advanced directives, counseling/discussion     Hypertension goal BP (blood pressure) < 140/90     Esophageal reflux     Right shoulder pain     Osteoarthritis of acromioclavicular joint     Bilateral bunions     Generalized anxiety disorder     Thyroid nodule     IFG (impaired fasting glucose)     Non morbid obesity, unspecified obesity type     Elevated TSH     Anti-TPO antibodies present     Hypothyroidism, unspecified type     Obesity (BMI 35.0-39.9) with comorbidity (H)     Past Surgical History:   Procedure Laterality Date     BUNIONECTOMY  2014    Procedure: BUNIONECTOMY;  Surgeon: Kevin Rodas DPM;  Location: [facility] OR     C  DELIVERY ONLY      , Low Cervical     C NONSPECIFIC PROCEDURE      lumpectomy, lt     C NONSPECIFIC PROCEDURE      ob      C NONSPECIFIC PROCEDURE      Vaginal delivery x 1     EXCISE MASS BACK  2014    Procedure: EXCISE MASS BACK;  Surgeon: Chaim Lacy MD;  Location: [facility] OR     HC ARTHROTOMY W/OPEN MENISCUS REPAIR      lt knee     HC DILATION/CURETTAGE  DIAG/THER NON OB  1982    D & C     TONSILLECTOMY  1966    tonsillectomy       Social History     Tobacco Use     Smoking status: Never Smoker     Smokeless tobacco: Never Used   Substance Use Topics     Alcohol use: Yes     Comment: 1 per week     Family History   Problem Relation Age of Onset     Lipids Mother      Hypertension Mother      Cancer Father         pancreatic     Alcohol/Drug Father         etoh     Other Cancer Father      Depression Father      Substance Abuse Father      Heart Disease Maternal Grandfather      Coronary Artery Disease Maternal Grandfather      Cerebrovascular Disease Paternal Grandfather      Psychotic Disorder Sister         bi polar     Neurologic Disorder Sister         ms     Neurologic Disorder Sister         cidp     Cerebrovascular Disease Paternal Grandmother          Current Outpatient Medications   Medication Sig Dispense Refill     Blood Pressure Monitoring KIT 1 kit daily 1 kit 0     calcitRIOL (ROCALTROL) 0.25 MCG capsule Take 1 capsule (0.25 mcg) by mouth daily 90 capsule 3     cholecalciferol (VITAMIN D) 1000 UNIT tablet Take 1 tablet by mouth daily. 100 tablet 12     citalopram (CELEXA) 40 MG tablet Take 1 tablet (40 mg) by mouth daily 90 tablet 3     levothyroxine (SYNTHROID/LEVOTHROID) 50 MCG tablet Take 1 tablet (50 mcg) by mouth daily 90 tablet 1     losartan (COZAAR) 50 MG tablet Take 1 tablet (50 mg) by mouth daily 90 tablet 3     omeprazole (PRILOSEC) 20 MG DR capsule Take 1 capsule (20 mg) by mouth daily as needed 90 capsule 3     pravastatin (PRAVACHOL) 20 MG tablet Take 1 tablet (20 mg) by mouth daily 90 tablet 3     scopolamine (TRANSDERM) 72 hr patch Apply 1 patch to hairless area behind one ear at least 4 hours before travel.  Remove old patch and change every 3 days (72 hours). (Patient taking differently: Place 1 patch onto the skin as needed Apply 1 patch to hairless area behind one ear at least 4 hours before travel.  Remove old patch and change  every 3 days (72 hours).) 4 patch 11     Allergies   Allergen Reactions     Norco [Hydrocodone-Acetaminophen] Nausea and Vomiting     Dizzy     Eggs Nausea and Vomiting and Diarrhea     Lisinopril Cough     Mucinex Hives     Penicillins Rash     Sulfa Drugs Hives     Sulfites Nausea and Vomiting and Diarrhea       Reviewed and updated as needed this visit by Provider       Review of Systems   ROS COMP: Constitutional, HEENT, cardiovascular, pulmonary, gi and gu systems are negative, except as otherwise noted.      Objective      /74 (BP Location: Right arm, Cuff Size: Adult Regular)   Pulse 76   Temp 98.9  F (37.2  C) (Tympanic)   Wt 87.1 kg (192 lb)   LMP 07/23/2004   SpO2 99%   BMI 36.28 kg/m      Physical Exam   GENERAL: healthy, alert and no distress  EYES: Eyes grossly normal to inspection, PERRL and conjunctivae and sclerae normal  HENT: ear canals and TM's normal, nose and mouth without ulcers or lesions  NECK: no adenopathy, no asymmetry, masses, or scars and thyroid normal to palpation  RESP: lungs clear to auscultation - no rales, rhonchi or wheezes  CV: regular rate and rhythm, normal S1 S2, no S3 or S4, no murmur, click or rub, no peripheral edema and peripheral pulses strong  MS: no gross musculoskeletal defects noted, no edema    Diagnostic Test Results:  Labs reviewed in Epic        Assessment & Plan       ICD-10-CM    1. Viral infection B34.9      She will begin taking zyrtec or benadryl if her eyes get puffy again  Will continue with fluids and tylenol  Unable to tolerate mucinex      KAVITHA Sosa [facility]

## 2019-11-29 ENCOUNTER — OFFICE VISIT (OUTPATIENT)
Dept: FAMILY MEDICINE | Facility: CLINIC | Age: 69
End: 2019-11-29
Payer: COMMERCIAL

## 2019-11-29 VITALS
OXYGEN SATURATION: 95 % | TEMPERATURE: 98.9 F | SYSTOLIC BLOOD PRESSURE: 120 MMHG | DIASTOLIC BLOOD PRESSURE: 70 MMHG | BODY MASS INDEX: 36.23 KG/M2 | HEART RATE: 79 BPM | WEIGHT: 191.9 LBS | HEIGHT: 61 IN

## 2019-11-29 DIAGNOSIS — E06.3 HYPOTHYROIDISM DUE TO HASHIMOTO'S THYROIDITIS: ICD-10-CM

## 2019-11-29 DIAGNOSIS — R76.8 ANTI-TPO ANTIBODIES PRESENT: ICD-10-CM

## 2019-11-29 DIAGNOSIS — H57.89 EYE SWOLLEN, BILATERAL: Primary | ICD-10-CM

## 2019-11-29 LAB — T3 SERPL-MCNC: 128 NG/DL (ref 60–181)

## 2019-11-29 PROCEDURE — 84439 ASSAY OF FREE THYROXINE: CPT | Performed by: INTERNAL MEDICINE

## 2019-11-29 PROCEDURE — 84480 ASSAY TRIIODOTHYRONINE (T3): CPT | Performed by: INTERNAL MEDICINE

## 2019-11-29 PROCEDURE — 84443 ASSAY THYROID STIM HORMONE: CPT | Performed by: INTERNAL MEDICINE

## 2019-11-29 PROCEDURE — 36415 COLL VENOUS BLD VENIPUNCTURE: CPT | Performed by: INTERNAL MEDICINE

## 2019-11-29 PROCEDURE — 99213 OFFICE O/P EST LOW 20 MIN: CPT | Performed by: INTERNAL MEDICINE

## 2019-11-29 ASSESSMENT — MIFFLIN-ST. JEOR: SCORE: 1342.83

## 2019-11-29 NOTE — PROGRESS NOTES
Subjective     Angela Story is a 69 year old female who presents to clinic today for the following health issues:    HPI   Bilateral eye problem x 3 weeks    69-year-old female diagnosed with Hashimoto's thyroiditis in 2019 on levothyroxine therapy with normal thyroid axis lab tests in 2019 presents with a 3-week history of puffiness around her bilateral eyes without eye pain, visual impairment or excessive tearing or eye itchiness.  She saw a nurse practitioner in this clinic and the putative diagnosis at the time was a viral infection.  She later saw an ophthalmologist who suggested that her symptoms might be related to allergies.  However, her symptoms did not improve with over-the-counter allergy medication.  She is frustrated by the persistence of her symptoms and seeks another opinion as to how to manage her eyelid puffiness.  She denies fevers or chills.  She denies other URI symptoms.  Her friend told her that her symptoms may be related to her thyroid medication.    Patient Active Problem List   Diagnosis     HYPERLIPIDEMIA LDL GOAL <100     CKD (chronic kidney disease) stage 3, GFR 30-59 ml/min (H)     Advanced directives, counseling/discussion     Hypertension goal BP (blood pressure) < 140/90     Esophageal reflux     Right shoulder pain     Osteoarthritis of acromioclavicular joint     Bilateral bunions     Generalized anxiety disorder     Thyroid nodule     IFG (impaired fasting glucose)     Non morbid obesity, unspecified obesity type     Elevated TSH     Anti-TPO antibodies present     Hypothyroidism, unspecified type     Obesity (BMI 35.0-39.9) with comorbidity (H)     Past Surgical History:   Procedure Laterality Date     BUNIONECTOMY  2014    Procedure: BUNIONECTOMY;  Surgeon: Kevin Rodas DPM;  Location: [facility]     C  DELIVERY ONLY      , Low Cervical     C NONSPECIFIC PROCEDURE      lumpectomy, lt     C NONSPECIFIC PROCEDURE      ob      C  NONSPECIFIC PROCEDURE  1983    Vaginal delivery x 1     EXCISE MASS BACK  4/30/2014    Procedure: EXCISE MASS BACK;  Surgeon: Chaim Lacy MD;  Location: [facility] OR     HC ARTHROTOMY W/OPEN MENISCUS REPAIR  2007    lt knee     HC DILATION/CURETTAGE DIAG/THER NON OB  1982    D & C     TONSILLECTOMY  1966    tonsillectomy       Social History     Tobacco Use     Smoking status: Never Smoker     Smokeless tobacco: Never Used   Substance Use Topics     Alcohol use: Yes     Comment: 1 per week     Family History   Problem Relation Age of Onset     Lipids Mother      Hypertension Mother      Cancer Father         pancreatic     Alcohol/Drug Father         etoh     Other Cancer Father      Depression Father      Substance Abuse Father      Heart Disease Maternal Grandfather      Coronary Artery Disease Maternal Grandfather      Cerebrovascular Disease Paternal Grandfather      Psychotic Disorder Sister         bi polar     Neurologic Disorder Sister         ms     Neurologic Disorder Sister         cidp     Cerebrovascular Disease Paternal Grandmother          Current Outpatient Medications   Medication Sig Dispense Refill     Blood Pressure Monitoring KIT 1 kit daily 1 kit 0     calcitRIOL (ROCALTROL) 0.25 MCG capsule Take 1 capsule (0.25 mcg) by mouth daily 90 capsule 3     cholecalciferol (VITAMIN D) 1000 UNIT tablet Take 1 tablet by mouth daily. 100 tablet 12     citalopram (CELEXA) 40 MG tablet Take 1 tablet (40 mg) by mouth daily 90 tablet 3     levothyroxine (SYNTHROID/LEVOTHROID) 50 MCG tablet Take 1 tablet (50 mcg) by mouth daily 90 tablet 1     losartan (COZAAR) 50 MG tablet Take 1 tablet (50 mg) by mouth daily 90 tablet 3     omeprazole (PRILOSEC) 20 MG DR capsule Take 1 capsule (20 mg) by mouth daily as needed 90 capsule 3     pravastatin (PRAVACHOL) 20 MG tablet Take 1 tablet (20 mg) by mouth daily 90 tablet 3     scopolamine (TRANSDERM) 72 hr patch Apply 1 patch to hairless area behind one ear at least 4 hours  "before travel.  Remove old patch and change every 3 days (72 hours). (Patient taking differently: Place 1 patch onto the skin as needed Apply 1 patch to hairless area behind one ear at least 4 hours before travel.  Remove old patch and change every 3 days (72 hours).) 4 patch 11     Allergies   Allergen Reactions     Norco [Hydrocodone-Acetaminophen] Nausea and Vomiting     Dizzy     Eggs Nausea and Vomiting and Diarrhea     Lisinopril Cough     Mucinex Hives     Penicillins Rash     Sulfa Drugs Hives     Sulfites Nausea and Vomiting and Diarrhea         Reviewed and updated as needed this visit by Provider         Review of Systems   ROS COMP: Pertinent positive negatives are reviewed in history of present illness      Objective    /70 (BP Location: Right arm, Cuff Size: Adult Large)   Pulse 79   Temp 98.9  F (37.2  C) (Oral)   Ht 1.549 m (5' 1\")   Wt 87 kg (191 lb 14.4 oz)   LMP 07/23/2004   SpO2 95%   BMI 36.26 kg/m    Body mass index is 36.26 kg/m .  Physical Exam   General: This is a well-appearing female in no acute distress.  There is mild to moderate periorbital edema noted bilaterally, extraocular movements are intact, visual acuity is intact, pupils are equal and round and reactive to light, the conjunctiva are normal, the posterior pharynx appears normal, the neck is supple without adenopathy, the thyroid gland feels normal and is not tender.  There is no lid lag or proptosis that is observable.    Thyroid axis lab tests are pending        Assessment & Plan     1. Eye swollen, bilateral  The etiology of her persistent eyelid swelling is unclear to me.  It could represent worsening hypothyroid disease and resultant myxedema?  That would be manifest by abnormal thyroid axis lab tests which were drawn today.  If the thyroid axis lab tests are normal, I think she should seek a third opinion or fourth opinion from a ophthalmologist to see if there are any other specific pathologies that could " contribute to her persistent eyelid problem.  I recommended Dr. Watkins from [facility].  Certainly, if her thyroid lab test suggest that we are under treating her Hashimoto's disease, adjustment in her levothyroxine dose would likely improve her facial swelling.  This was discussed with her in detail.  - OPHTHALMOLOGY ADULT REFERRAL    2. Hypothyroidism due to Hashimoto's thyroiditis    - TSH with free T4 reflex  - T3 total  - T4 free           Return in about 5 months (around 4/23/2020) for Preventive Visit; with Dr. Geller; or sooner pending labs./ symptoms     Anil Iyer MD  [facility]    Total face to face contact time was greater than 15 minutes of which more than 50% of this time was spent counseling and coordinating care regarding the above topics.

## 2019-11-30 LAB
T4 FREE SERPL-MCNC: 1.24 NG/DL (ref 0.76–1.46)
TSH SERPL DL<=0.005 MIU/L-ACNC: 0.09 MU/L (ref 0.4–4)

## 2019-11-30 RX ORDER — LEVOTHYROXINE SODIUM 25 UG/1
50 TABLET ORAL DAILY
Qty: 90 TABLET | Refills: 3 | Status: SHIPPED | OUTPATIENT
Start: 2019-11-30 | End: 2020-06-23

## 2019-12-01 ENCOUNTER — MYC MEDICAL ADVICE (OUTPATIENT)
Dept: FAMILY MEDICINE | Facility: CLINIC | Age: 69
End: 2019-12-01

## 2019-12-18 ENCOUNTER — TRANSFERRED RECORDS (OUTPATIENT)
Dept: HEALTH INFORMATION MANAGEMENT | Facility: CLINIC | Age: 69
End: 2019-12-18

## 2019-12-18 LAB — TSH SERPL-ACNC: 0.44 UIU/ML (ref 0.3–5)

## 2019-12-20 ENCOUNTER — HOSPITAL ENCOUNTER (OUTPATIENT)
Dept: ULTRASOUND IMAGING | Facility: CLINIC | Age: 69
Discharge: HOME | End: 2019-12-20
Attending: SPECIALIST | Admitting: SPECIALIST
Payer: COMMERCIAL

## 2019-12-20 DIAGNOSIS — E04.1 THYROID CYST: ICD-10-CM

## 2019-12-20 PROCEDURE — 76536 US EXAM OF HEAD AND NECK: CPT

## 2019-12-26 ENCOUNTER — TRANSFERRED RECORDS (OUTPATIENT)
Dept: HEALTH INFORMATION MANAGEMENT | Facility: CLINIC | Age: 69
End: 2019-12-26

## 2020-01-27 ENCOUNTER — OFFICE VISIT (OUTPATIENT)
Dept: OPHTHALMOLOGY | Facility: CLINIC | Age: 70
End: 2020-01-27
Attending: OPHTHALMOLOGY
Payer: COMMERCIAL

## 2020-01-27 DIAGNOSIS — E06.3 HYPOTHYROIDISM DUE TO HASHIMOTO'S THYROIDITIS: ICD-10-CM

## 2020-01-27 DIAGNOSIS — H57.89 THYROID EYE DISEASE: Primary | ICD-10-CM

## 2020-01-27 DIAGNOSIS — E07.9 THYROID EYE DISEASE: Primary | ICD-10-CM

## 2020-01-27 PROCEDURE — G0463 HOSPITAL OUTPT CLINIC VISIT: HCPCS | Mod: 25,ZF

## 2020-01-27 PROCEDURE — 92285 EXTERNAL OCULAR PHOTOGRAPHY: CPT | Mod: ZF | Performed by: OPHTHALMOLOGY

## 2020-01-27 ASSESSMENT — VISUAL ACUITY
OS_CC+: -1
OD_CC: 20/20
METHOD: SNELLEN - LINEAR
CORRECTION_TYPE: GLASSES
OS_CC: 20/20

## 2020-01-27 ASSESSMENT — TONOMETRY
OS_IOP_MMHG: 17
OD_IOP_MMHG: 17
OS_IOP_MMHG: 14
OD_IOP_MMHG: 12

## 2020-01-27 ASSESSMENT — CONF VISUAL FIELD
OS_NORMAL: 1
OD_NORMAL: 1
METHOD: COUNTING FINGERS

## 2020-01-27 ASSESSMENT — REFRACTION_WEARINGRX
OD_CYLINDER: +2.00
OD_SPHERE: -4.50
OD_AXIS: 180
OS_SPHERE: -4.00
OS_AXIS: 180
OS_ADD: +2.75
OD_ADD: +2.75
OS_CYLINDER: +1.00
SPECS_TYPE: TRIFOCAL

## 2020-01-27 ASSESSMENT — CUP TO DISC RATIO
OS_RATIO: 0.2
OD_RATIO: 0.2

## 2020-01-27 ASSESSMENT — SLIT LAMP EXAM - LIDS: COMMENTS: DERMATOCHALASIS,

## 2020-01-27 NOTE — NURSING NOTE
Chief Complaint(s) and History of Present Illness(es)     Thyroid Disease     Laterality: both eyes    Onset: sudden    Location: peripheral vision    Severity: mild    Frequency: infrequently    Course: stable    Associated symptoms: redness.  Negative for double vision and eye pain    Treatments tried: eye drops and glasses    Pain scale: 0/10              Comments     TSH 0.44 (12/18/2019). Referred from endocrinologist for evaluation of thyroid eye disease. Diagnosed with hypothyroidism and hashimotos in June 2019. Takes levothyroxine 25 mg everyday. Since November 2019 her eyes and lids are red, puffy and irritated. Eye swelling as improved since decrease of Levo. In early December. Has tried taking zyrtec and always to help with red and puffy eyes, but nothing has helped. Vision seems that she is looking through a rain drop. No double vision. Feels eye motility is more difficult than before (looking up and down). Prev. MRI in [facility].

## 2020-01-27 NOTE — LETTER
2020         RE:  :  MRN: Angela Story  1950  [id number]     Dear Dr. Sanabria,    Thank you for asking me to see your very pleasant patient, Angela Story, in neuro-ophthalmic consultation.  I would like to thank you for sending your records and I have summarized them in the history of present illness.  My assessment and plan are below.  For further details, please see my attached clinic note.      Assessment & Plan     HPI: Patient is a 69 year old female diagnosed with Hashimoto's thyroiditis in 2019 and started on levothyroxine. In early 2019 she notes that she was out walking in the cold and had a upper respiratory infection at night and woke up with swelling of both shala-orbital areas. She saw a NP and was told she had allergies and was told to take Claritin. She saw an ophthalmologist at [facility] and was told again that she had allergies and told her to start Zyrtec, flonase, and anti-allergy drops. She did not have any improvement in her symptoms. She was seen by a family practice provider and found to be hyperthyroid based upon TSH level. She saw Dr. Sanabria and was then referred. She last had shala-ocular swelling around .     Non-smoker    Referring physician: Dr. Sanabria    Thyroid history:  Diagnosed when? 2019 with Hashimoto's  BROOKS: NA  Thyroidectomy: NA    TSI (date): 410 % baseline (normal < 140%)      Previous results:NA    Eye symptoms (since when):   Proptosis (better/worse/same since last visit): No, initial visit   Diplopia(better/worse/same since last visit): No, initial visit   Eyelid retraction(better/worse/same since last visit): No, initial  Tearing(better/worse/same since last visit): Mild, initial  Redness (better/worse/same since last visit): Mild, initial  Pain ((better/worse/same since last visit): None, initial  Pain to move the eyes (better/worse/same since last visit): None, initial  Blurred vision: None    Ocular history:   Orbital  decompression (date, details): NA  Strabismus surgery (date, details): NA  Eyelid surgery (date, details): NA    Exam:   Loulou (base):100/19/21     Better/worse same: Initial  Strabismus (better/worse/same): Ortho at distance and near, initial  Eyelid retraction (better/worse/same): None    Angela Story is a 69 year old female with the following diagnoses:   1. Thyroid eye disease       Angela is a 69 year old female with a history of hypothyroidism diagnosed in 2018 sent by Dr. Singh for possible thyroid eye disease. On review of photographs she has significant shala-ocular edema in the morning that improves over the course of a day. She has mild shala-ocular edema in the afternoon, mild conjunctival injection, and an elevated TSI consistent with hypothyroid thyroid eye disease. She appears mildly active. Recommend follow up in 4-6 months.      Again, thank you for allowing me to participate in the care of your patient.      Sincerely,    Raphael Aguero MD  Professor  Ophthalmology Residency   Director of Neuro-Ophthalmology  [facility] Chair  Departments of Ophthalmology, Neurology, and Neurosurgery  [address]  [address]  T - [phone]  F - [phone]  [email or web address] [email or web address]      CC: CHASITY Sanabria MD  [facility]  [address]  [facility] [address]  VIA Mail     Satish Scott  [facility]  [address]  [facility] [address]  VIA In Basket     Merary Geller MD  [address]  [facility] [address]  VIA In Basket       DX =Thyroid eye disease

## 2020-01-27 NOTE — PROGRESS NOTES
Assessment & Plan     HPI: Patient is a 69 year old female diagnosed with Hashimoto's thyroiditis in June 2019 and started on levothyroxine. In early November 2019 she notes that she was out walking in the cold and had a upper respiratory infection at night and woke up with swelling of both shala-orbital areas. She saw a NP and was told she had allergies and was told to take Claritin. She saw an ophthalmologist at [facility] and was told again that she had allergies and told her to start Zyrtec, flonase, and anti-allergy drops. She did not have any improvement in her symptoms. She was seen by a family practice provider and found to be hyperthyroid based upon TSH level. She saw Dr. Sanabria and was then referred. She last had shala-ocular swelling around November 1st.     Non-smoker    Referring physician: Dr. Sanabria    Thyroid history:  Diagnosed when? June 2019 with Hashimoto's  BROOKS: NA  Thyroidectomy: NA    TSI (date): 410 % baseline (normal < 140%)      Previous results:NA    Eye symptoms (since when):   Proptosis (better/worse/same since last visit): No, initial visit   Diplopia(better/worse/same since last visit): No, initial visit   Eyelid retraction(better/worse/same since last visit): No, initial  Tearing(better/worse/same since last visit): Mild, initial  Redness (better/worse/same since last visit): Mild, initial  Pain ((better/worse/same since last visit): None, initial  Pain to move the eyes (better/worse/same since last visit): None, initial  Blurred vision: None    Ocular history:   Orbital decompression (date, details): NA  Strabismus surgery (date, details): NA  Eyelid surgery (date, details): NA    Exam:   Loulou (base):100/19/21     Better/worse same: Initial  Strabismus (better/worse/same): Ortho at distance and near, initial  Eyelid retraction (better/worse/same): None    Angela Story is a 69 year old female with the following diagnoses:   1. Thyroid eye disease       Angela is a 69  year old female with a history of hypothyroidism diagnosed in 2018 sent by Dr. Singh for possible thyroid eye disease. On review of photographs she has significant shala-ocular edema in the morning that improves over the course of a day. She has mild shala-ocular edema in the afternoon, mild conjunctival injection, and an elevated TSI consistent with hypothyroid thyroid eye disease. She appears mildly active. Recommend follow up in 4-6 months.             Attending Physician Attestation:  Complete documentation of historical and exam elements from today's encounter can be found in the full encounter summary report (not reduplicated in this progress note).  I personally obtained the chief complaint(s) and history of present illness.  I confirmed and edited as necessary the review of systems, past medical/surgical history, family history, social history, and examination findings as documented by others; and I examined the patient myself.  I personally reviewed the relevant tests, images, and reports as documented above.  I formulated and edited as necessary the assessment and plan and discussed the findings and management plan with the patient and family. - Raphael Nazario MD  Ophthalmology, PGY-5  Neuro-Ophthalmology Fellow

## 2020-01-27 NOTE — Clinical Note
1/27/2020       RE: Angela Story  [address]  [address]  [place] [address]     Dear Colleague,    Thank you for referring your patient, Angela Story, to the [facility] PEDS EYE GENERAL at [facility]. Please see a copy of my visit note below.           Assessment & Plan     HPI: Patient is a 69 year old female diagnosed with Hashimoto's thyroiditis in June 2019 and started on levothyroxine. In early November 2019 she notes that she was out walking in the cold and had a upper respiratory infection at night and woke up with swelling of both shala-orbital areas. She saw a NP and was told she had allergies and was told to take Claritin. She saw an ophthalmologist at [facility] and was told again that she had allergies and told her to start Zyrtec, flonase, and anti-allergy drops. She did not have any improvement in her symptoms. She was seen by a family practice provider and found to be hyperthyroid based upon TSH level. She saw Dr. Sanabria and was then referred. She last had shala-ocular swelling around January 1st.     Non-smoker    Referring physician: Dr. Sanabria    Thyroid history:  Diagnosed when? June 2019 with Hashimoto's  BROOKS: NA  Thyroidectomy: NA    TSI (date): 410 % baseline (normal < 140%)      Previous results:NA    Eye symptoms (since when):   Proptosis (better/worse/same since last visit): No, initial visit   Diplopia(better/worse/same since last visit): No, initial visit   Eyelid retraction(better/worse/same since last visit): No, initial  Tearing(better/worse/same since last visit): Mild, initial  Redness (better/worse/same since last visit): Mild, initial  Pain ((better/worse/same since last visit): None, initial  Pain to move the eyes (better/worse/same since last visit): None, initial  Blurred vision: None    Ocular history:   Orbital decompression (date, details): NA  Strabismus surgery (date, details): NA  Eyelid surgery (date, details): NA    Exam:   Loulou  (base):100/19/21     Better/worse same: Initial  Strabismus (better/worse/same): Ortho at distance and near, initial  Eyelid retraction (better/worse/same): None    Angela Story is a 69 year old female with the following diagnoses:   1. Thyroid eye disease       Angela is a 69 year old female with a history of hypothyroidism diagnosed in 2018 sent by Dr. Singh for possible thyroid eye disease. On review of photographs she has significant shala-ocular edema in the morning that improves over the course of a day. She has mild shala-ocular edema in the afternoon, mild conjunctival injection, and an elevated TSI consistent with hypothyroid thyroid eye disease. She appears mildly active. Recommend follow up in 4 months.    Raphael Nazario MD  Ophthalmology, PGY-5  Neuro-Ophthalmology Fellow                @ATT@             Assessment & Plan     HPI: Patient is a 69 year old female diagnosed with Hashimoto's thyroiditis in June 2019 and started on levothyroxine. In early November 2019 she notes that she was out walking in the cold and had a upper respiratory infection at night and woke up with swelling of both shala-orbital areas. She saw a NP and was told she had allergies and was told to take Claritin. She saw an ophthalmologist at [facility] and was told again that she had allergies and told her to start Zyrtec, flonase, and anti-allergy drops. She did not have any improvement in her symptoms. She was seen by a family practice provider and found to be hyperthyroid based upon TSH level. She saw Dr. Sanabria and was then referred. She last had shala-ocular swelling around November 1st.     Non-smoker    Referring physician: Dr. Sanabria    Thyroid history:  Diagnosed when? June 2019 with Hashimoto's  BROOKS: NA  Thyroidectomy: NA    TSI (date): 410 % baseline (normal < 140%)      Previous results:NA    Eye symptoms (since when):   Proptosis (better/worse/same since last visit): No, initial visit    Diplopia(better/worse/same since last visit): No, initial visit   Eyelid retraction(better/worse/same since last visit): No, initial  Tearing(better/worse/same since last visit): Mild, initial  Redness (better/worse/same since last visit): Mild, initial  Pain ((better/worse/same since last visit): None, initial  Pain to move the eyes (better/worse/same since last visit): None, initial  Blurred vision: None    Ocular history:   Orbital decompression (date, details): NA  Strabismus surgery (date, details): NA  Eyelid surgery (date, details): NA    Exam:   Loulou (base):100/19/21     Better/worse same: Initial  Strabismus (better/worse/same): Ortho at distance and near, initial  Eyelid retraction (better/worse/same): None    Angela Story is a 69 year old female with the following diagnoses:   1. Thyroid eye disease       Angela is a 69 year old female with a history of hypothyroidism diagnosed in 2018 sent by Dr. Singh for possible thyroid eye disease. On review of photographs she has significant shala-ocular edema in the morning that improves over the course of a day. She has mild shala-ocular edema in the afternoon, mild conjunctival injection, and an elevated TSI consistent with hypothyroid thyroid eye disease. She appears mildly active. Recommend follow up in 4-6 months.             Attending Physician Attestation:  Complete documentation of historical and exam elements from today's encounter can be found in the full encounter summary report (not reduplicated in this progress note).  I personally obtained the chief complaint(s) and history of present illness.  I confirmed and edited as necessary the review of systems, past medical/surgical history, family history, social history, and examination findings as documented by others; and I examined the patient myself.  I personally reviewed the relevant tests, images, and reports as documented above.  I formulated and edited as necessary the assessment and plan and  discussed the findings and management plan with the patient and family. - Raphael Nazario MD  Ophthalmology, PGY-5  Neuro-Ophthalmology Fellow    Again, thank you for allowing me to participate in the care of your patient.      Sincerely,    Raphael Aguero MD

## 2020-03-21 DIAGNOSIS — I10 HYPERTENSION GOAL BP (BLOOD PRESSURE) < 140/90: ICD-10-CM

## 2020-03-21 DIAGNOSIS — E78.5 HYPERLIPIDEMIA LDL GOAL <100: ICD-10-CM

## 2020-03-21 DIAGNOSIS — F41.1 GENERALIZED ANXIETY DISORDER: ICD-10-CM

## 2020-03-21 NOTE — TELEPHONE ENCOUNTER
Refill request:    Citalopram 40 mg tablets..Qty 90     Losartan 50 mg tablets. Qty 90.     Pravastatin 20 mg tablets. Qty 90.

## 2020-03-23 RX ORDER — PRAVASTATIN SODIUM 20 MG
20 TABLET ORAL DAILY
Qty: 90 TABLET | Refills: 3 | Status: SHIPPED | OUTPATIENT
Start: 2020-03-23 | End: 2020-03-23

## 2020-03-23 RX ORDER — CITALOPRAM HYDROBROMIDE 40 MG/1
40 TABLET ORAL DAILY
Qty: 90 TABLET | Refills: 1 | Status: SHIPPED | OUTPATIENT
Start: 2020-03-23 | End: 2020-03-23

## 2020-03-23 RX ORDER — LOSARTAN POTASSIUM 50 MG/1
50 TABLET ORAL DAILY
Qty: 90 TABLET | Refills: 3 | Status: SHIPPED | OUTPATIENT
Start: 2020-03-23 | End: 2020-03-23

## 2020-03-23 NOTE — TELEPHONE ENCOUNTER
"citalopram (CELEXA) 40 MG tablet  90 tablet  3  1/21/2019      losartan (COZAAR) 50 MG tablet  90 tablet  3  1/21/2019      pravastatin (PRAVACHOL) 20 MG tablet  90 tablet  3  1/21/2019      Last Written Prescription Date:  1/21/2019  Last Fill Quantity: 90,  # refills: 3   Last office visit: 11/29/2019 with prescribing provider: CHASITY Iyer    Future Office Visit: Unknown     Requested Prescriptions   Pending Prescriptions Disp Refills     citalopram (CELEXA) 40 MG tablet 90 tablet 3     Sig: Take 1 tablet (40 mg) by mouth daily       SSRIs Protocol Passed - 3/21/2020  9:02 AM        Passed - Recent (12 mo) or future (30 days) visit within the authorizing provider's specialty     Patient has had an office visit with the authorizing provider or a provider within the authorizing providers department within the previous 12 mos or has a future within next 30 days. See \"Patient Info\" tab in inbasket, or \"Choose Columns\" in Meds & Orders section of the refill encounter.              Passed - Medication is active on med list        Passed - Patient is age 18 or older        Passed - No active pregnancy on record        Passed - No positive pregnancy test in last 12 months           losartan (COZAAR) 50 MG tablet 90 tablet 3     Sig: Take 1 tablet (50 mg) by mouth daily       Angiotensin-II Receptors Failed - 3/21/2020  9:02 AM        Failed - Normal serum creatinine on file in past 12 months     Recent Labs   Lab Test 01/21/19  1141   CR 1.01       Ok to refill medication if creatinine is low          Failed - Normal serum potassium on file in past 12 months     Recent Labs   Lab Test 01/21/19  1141   POTASSIUM 4.2                    Passed - Last blood pressure under 140/90 in past 12 months     BP Readings from Last 3 Encounters:   11/29/19 120/70   11/18/19 136/74   04/22/19 119/69                 Passed - Recent (12 mo) or future (30 days) visit within the authorizing provider's specialty     Patient has had an office " "visit with the authorizing provider or a provider within the authorizing providers department within the previous 12 mos or has a future within next 30 days. See \"Patient Info\" tab in inbasket, or \"Choose Columns\" in Meds & Orders section of the refill encounter.              Passed - Medication is active on med list        Passed - Patient is age 18 or older        Passed - No active pregnancy on record        Passed - No positive pregnancy test in past 12 months           pravastatin (PRAVACHOL) 20 MG tablet 90 tablet 3     Sig: Take 1 tablet (20 mg) by mouth daily       Statins Protocol Failed - 3/21/2020  9:02 AM        Failed - LDL on file in past 12 months     Recent Labs   Lab Test 01/21/19  1141   LDL 80             Passed - No abnormal creatine kinase in past 12 months     No lab results found.             Passed - Recent (12 mo) or future (30 days) visit within the authorizing provider's specialty     Patient has had an office visit with the authorizing provider or a provider within the authorizing providers department within the previous 12 mos or has a future within next 30 days. See \"Patient Info\" tab in inAAMPPet, or \"Choose Columns\" in Meds & Orders section of the refill encounter.              Passed - Medication is active on med list        Passed - Patient is age 18 or older        Passed - No active pregnancy on record        Passed - No positive pregnancy test in past 12 months             "

## 2020-05-15 ENCOUNTER — TELEPHONE (OUTPATIENT)
Dept: OPHTHALMOLOGY | Facility: CLINIC | Age: 70
End: 2020-05-15

## 2020-05-15 NOTE — TELEPHONE ENCOUNTER
Spoke to patient who confirmed the appointment for 5/18/20. They were advised of the changes due to Covid-19 (Visitor Restrictions, screening, etc.)     -Alysia David

## 2020-05-18 ENCOUNTER — OFFICE VISIT (OUTPATIENT)
Dept: OPHTHALMOLOGY | Facility: CLINIC | Age: 70
End: 2020-05-18
Attending: OPHTHALMOLOGY
Payer: COMMERCIAL

## 2020-05-18 DIAGNOSIS — H57.89 THYROID EYE DISEASE: Primary | ICD-10-CM

## 2020-05-18 DIAGNOSIS — E07.9 THYROID EYE DISEASE: Primary | ICD-10-CM

## 2020-05-18 PROCEDURE — G0463 HOSPITAL OUTPT CLINIC VISIT: HCPCS | Mod: ZF | Performed by: TECHNICIAN/TECHNOLOGIST

## 2020-05-18 ASSESSMENT — VISUAL ACUITY
OS_CC: 20/20
OD_CC+: -2
CORRECTION_TYPE: GLASSES
METHOD: SNELLEN - LINEAR
OD_CC: 20/20
OS_CC+: -2

## 2020-05-18 ASSESSMENT — REFRACTION_WEARINGRX
OD_SPHERE: -4.50
OS_CYLINDER: +1.00
SPECS_TYPE: TRIFOCAL
OS_ADD: +2.75
OD_CYLINDER: +2.00
OS_AXIS: 180
OS_SPHERE: -4.00
OD_ADD: +2.75
OD_AXIS: 180

## 2020-05-18 ASSESSMENT — TONOMETRY
OD_IOP_MMHG: 17
IOP_METHOD: ICARE
OD_IOP_MMHG: 27
OS_IOP_MMHG: 23
OS_IOP_MMHG: 17

## 2020-05-18 ASSESSMENT — LAGOPHTHALMOS
OS_LAGOPHTHALMOS: 0
OD_LAGOPHTHALMOS: 0

## 2020-05-18 ASSESSMENT — CUP TO DISC RATIO
OD_RATIO: 0.2
OS_RATIO: 0.2

## 2020-05-18 ASSESSMENT — MARGIN REFLEX DISTANCE
OS_MRD2: 5
OD_MRD1: 3
OD_MRD2: 5
OS_MRD1: 3

## 2020-05-18 NOTE — PROGRESS NOTES
Assessment & Plan     HPI: Patient is a 69 year old female diagnosed with Hashimoto's thyroiditis in June 2019 and started on levothyroxine. In early November 2019 she notes that she was out walking in the cold and had a upper respiratory infection at night and woke up with swelling of both shala-orbital areas. She saw a NP and was told she had allergies and was told to take Claritin. She saw an ophthalmologist at [facility] and was told again that she had allergies and told her to start Zyrtec, flonase, and anti-allergy drops. She did not have any improvement in her symptoms. She was seen by a family practice provider and found to be hyperthyroid based upon TSH level. She saw Dr. Sanabria and was then referred. She last had shala-ocular swelling around November 1st.     Interval history: Notes significant improvement in periorbital edema since LV in 1/2020. No VA changes, no diplopia, at times feels she has difficulty looking up. She is on levothyroxine 25mcg daily since 12/2019 (was previously on 50mcg daily). Non-smoker.    Referring physician: Dr. Sanabria    Thyroid history:  Diagnosed when? June 2019 with Hashimoto's  BROOKS: NA  Thyroidectomy: NA    TSI (date): 410 % baseline (normal < 140%) (12/2019)    Previous results:NA    Eye symptoms (since 1/2020):   Proptosis (better/worse/same since last visit): None  Diplopia(better/worse/same since last visit): None  Eyelid retraction(better/worse/same since last visit): None  Tearing(better/worse/same since last visit): Better  Redness (better/worse/same since last visit): Better  Pain ((better/worse/same since last visit): None  Pain to move the eyes (better/worse/same since last visit): None  Blurred vision: None    Ocular history:   Orbital decompression (date, details): NA  Strabismus surgery (date, details): NA  Eyelid surgery (date, details): NA    Exam:   Loulou (base):100    Better/worse same: Same  Strabismus (better/worse/same): Ortho at distance  and near  Eyelid retraction (better/worse/same): None    Angela Story is a 69 year old female with the following diagnoses:   1. Thyroid eye disease         Angela is a 69 year old female with a history of Hashimotos diagnosed in 6/2019 who presents for f/u thyroid eye disease. At her initial visit in thyroid eye disease clinic in 1/2020 she had significant shala-ocular edema and elevated TSI consistent with hypothyroid active thyroid eye disease.     She appears mildly active but periorbital edema is much improved since last visit in 1/2020. Strabismus exam stable, Loulou stable, no diplopia. No optic neuropathy.    -Continue follow up with endocrinology  -Start selenium 200mcg daily  -ATs as needed  -Continue to avoid smoke  - follow up as needed for worsening symptoms         Attending Physician Attestation:  Complete documentation of historical and exam elements from today's encounter can be found in the full encounter summary report (not reduplicated in this progress note).  I personally obtained the chief complaint(s) and history of present illness.  I confirmed and edited as necessary the review of systems, past medical/surgical history, family history, social history, and examination findings as documented by others; and I examined the patient myself.  I personally reviewed the relevant tests, images, and reports as documented above.  I formulated and edited as necessary the assessment and plan and discussed the findings and management plan with the patient and family. - Raphael Hoang MD  Oculoplastic Surgery Fellow

## 2020-05-18 NOTE — NURSING NOTE
Chief Complaint(s) and History of Present Illness(es)     Thyroid Disease     Laterality: both eyes    Associated symptoms: redness.  Negative for eye pain, tearing, pain with eye movement, photophobia and discharge    Treatments tried: eye drops    Response to treatment: moderate improvement    Comments: Improvement in appearance of eyes since LV, no VA changes, no diplopia, at times feels she has difficulty looking up

## 2020-06-11 DIAGNOSIS — N18.30 CKD (CHRONIC KIDNEY DISEASE) STAGE 3, GFR 30-59 ML/MIN (H): ICD-10-CM

## 2020-06-11 RX ORDER — CALCITRIOL 0.25 UG/1
0.25 CAPSULE, LIQUID FILLED ORAL DAILY
Qty: 90 CAPSULE | Refills: 0 | Status: SHIPPED | OUTPATIENT
Start: 2020-06-11 | End: 2020-07-17

## 2020-06-11 NOTE — TELEPHONE ENCOUNTER
Paper Fax from [facility] for refill on calcitrol 0.25 mg 1 capsule daily by mouth.    Last filled 04/22/2019 for a year

## 2020-06-11 NOTE — TELEPHONE ENCOUNTER
Routing refill request to provider for review/approval because:  Drug not on the [facility] refill protocol   A break in medication- last rx would have been complete 4/22 per med list.  Pt is scheduled 7/1 with PCP.  Please authorize if appropriate.  Thanks,  Lydia Garcia RN              Requested Prescriptions   Pending Prescriptions Disp Refills     calcitRIOL (ROCALTROL) 0.25 MCG capsule 90 capsule 3     Sig: Take 1 capsule (0.25 mcg) by mouth daily       There is no refill protocol information for this order

## 2020-06-23 DIAGNOSIS — E03.9 HYPOTHYROIDISM, UNSPECIFIED TYPE: ICD-10-CM

## 2020-06-23 DIAGNOSIS — Z13.0 SCREENING FOR DEFICIENCY ANEMIA: ICD-10-CM

## 2020-06-23 DIAGNOSIS — E06.3 HYPOTHYROIDISM DUE TO HASHIMOTO'S THYROIDITIS: ICD-10-CM

## 2020-06-23 DIAGNOSIS — R76.8 ANTI-TPO ANTIBODIES PRESENT: ICD-10-CM

## 2020-06-23 DIAGNOSIS — N18.30 CKD (CHRONIC KIDNEY DISEASE) STAGE 3, GFR 30-59 ML/MIN (H): ICD-10-CM

## 2020-06-23 DIAGNOSIS — R73.09 ELEVATED GLUCOSE: ICD-10-CM

## 2020-06-23 DIAGNOSIS — E78.5 HYPERLIPIDEMIA LDL GOAL <100: ICD-10-CM

## 2020-06-23 LAB
ALBUMIN SERPL-MCNC: 3.8 G/DL (ref 3.4–5)
ALP SERPL-CCNC: 76 U/L (ref 40–150)
ALT SERPL W P-5'-P-CCNC: 24 U/L (ref 0–50)
ANION GAP SERPL CALCULATED.3IONS-SCNC: 8 MMOL/L (ref 3–14)
AST SERPL W P-5'-P-CCNC: 19 U/L (ref 0–45)
BILIRUB SERPL-MCNC: 0.5 MG/DL (ref 0.2–1.3)
BUN SERPL-MCNC: 14 MG/DL (ref 7–30)
CALCIUM SERPL-MCNC: 9.1 MG/DL (ref 8.5–10.1)
CHLORIDE SERPL-SCNC: 107 MMOL/L (ref 94–109)
CHOLEST SERPL-MCNC: 192 MG/DL
CO2 SERPL-SCNC: 25 MMOL/L (ref 20–32)
CREAT SERPL-MCNC: 0.96 MG/DL (ref 0.52–1.04)
ERYTHROCYTE [DISTWIDTH] IN BLOOD BY AUTOMATED COUNT: 12.8 % (ref 10–15)
GFR SERPL CREATININE-BSD FRML MDRD: 61 ML/MIN/{1.73_M2}
GLUCOSE SERPL-MCNC: 106 MG/DL (ref 70–99)
HBA1C MFR BLD: 5.6 % (ref 0–5.6)
HCT VFR BLD AUTO: 42.4 % (ref 35–47)
HDLC SERPL-MCNC: 61 MG/DL
HGB BLD-MCNC: 14.2 G/DL (ref 11.7–15.7)
LDLC SERPL CALC-MCNC: 102 MG/DL
MCH RBC QN AUTO: 30.6 PG (ref 26.5–33)
MCHC RBC AUTO-ENTMCNC: 33.5 G/DL (ref 31.5–36.5)
MCV RBC AUTO: 91 FL (ref 78–100)
NONHDLC SERPL-MCNC: 131 MG/DL
PLATELET # BLD AUTO: 168 10E9/L (ref 150–450)
POTASSIUM SERPL-SCNC: 4.8 MMOL/L (ref 3.4–5.3)
PROT SERPL-MCNC: 7.4 G/DL (ref 6.8–8.8)
RBC # BLD AUTO: 4.64 10E12/L (ref 3.8–5.2)
SODIUM SERPL-SCNC: 140 MMOL/L (ref 133–144)
T4 FREE SERPL-MCNC: 1 NG/DL (ref 0.76–1.46)
TRIGL SERPL-MCNC: 145 MG/DL
TSH SERPL DL<=0.005 MIU/L-ACNC: 5.62 MU/L (ref 0.4–4)
WBC # BLD AUTO: 6.8 10E9/L (ref 4–11)

## 2020-06-23 PROCEDURE — 83036 HEMOGLOBIN GLYCOSYLATED A1C: CPT | Performed by: INTERNAL MEDICINE

## 2020-06-23 PROCEDURE — 84439 ASSAY OF FREE THYROXINE: CPT | Performed by: INTERNAL MEDICINE

## 2020-06-23 PROCEDURE — 84443 ASSAY THYROID STIM HORMONE: CPT | Performed by: INTERNAL MEDICINE

## 2020-06-23 PROCEDURE — 36415 COLL VENOUS BLD VENIPUNCTURE: CPT | Performed by: INTERNAL MEDICINE

## 2020-06-23 PROCEDURE — 85027 COMPLETE CBC AUTOMATED: CPT | Performed by: INTERNAL MEDICINE

## 2020-06-23 PROCEDURE — 80061 LIPID PANEL: CPT | Performed by: INTERNAL MEDICINE

## 2020-06-23 PROCEDURE — 80053 COMPREHEN METABOLIC PANEL: CPT | Performed by: INTERNAL MEDICINE

## 2020-06-23 RX ORDER — LEVOTHYROXINE SODIUM 25 UG/1
25 TABLET ORAL DAILY
Qty: 90 TABLET | Refills: 3
Start: 2020-06-23 | End: 2020-07-17

## 2020-06-24 NOTE — RESULT ENCOUNTER NOTE
Jessica Miller,    This is to inform you regarding your test result.    TSH which is thyroid hormone is elevated   Increase levothyroxine to 50 mcg po daily  You confirmed that your are on 25  mcg currently and 50 mcg is error in our medication  list  so we are recommending to increase to 50 mcg po daily.  As you are following endocrinologist .  Please discuss this with him and let me know what is his recommendation.  So we can update our records.  HbA1c which is average glucose during last 3 months is normal.  Glucose which is your blood sugar is slightly elevated.  The testing of your kidney function, liver function and electrolytes was satisfactory   Your total cholesterol is normal.  HDL which is called good cholesterol is normal.  Your LDL cholesterol is elevated   Your triglycerides are normal.  Eat low cholesterol low fat  diet and do regular physical activity. Avoid high sugar containing food.  CBC result which includes white count Hemoglobin and  Platelet Counts is normal.         Sincerely,      Dr.Nasima Yimi MD,FACP

## 2020-07-06 ENCOUNTER — TRANSFERRED RECORDS (OUTPATIENT)
Dept: HEALTH INFORMATION MANAGEMENT | Facility: CLINIC | Age: 70
End: 2020-07-06

## 2020-07-17 ENCOUNTER — OFFICE VISIT (OUTPATIENT)
Dept: FAMILY MEDICINE | Facility: CLINIC | Age: 70
End: 2020-07-17
Payer: COMMERCIAL

## 2020-07-17 VITALS
BODY MASS INDEX: 35.68 KG/M2 | TEMPERATURE: 97.1 F | SYSTOLIC BLOOD PRESSURE: 130 MMHG | OXYGEN SATURATION: 96 % | HEIGHT: 61 IN | WEIGHT: 189 LBS | DIASTOLIC BLOOD PRESSURE: 70 MMHG | HEART RATE: 72 BPM

## 2020-07-17 DIAGNOSIS — Z29.9 PREVENTIVE MEASURE: ICD-10-CM

## 2020-07-17 DIAGNOSIS — Z00.00 ENCOUNTER FOR MEDICARE ANNUAL WELLNESS EXAM: Primary | ICD-10-CM

## 2020-07-17 DIAGNOSIS — N18.30 CKD (CHRONIC KIDNEY DISEASE) STAGE 3, GFR 30-59 ML/MIN (H): ICD-10-CM

## 2020-07-17 DIAGNOSIS — E06.3 HYPOTHYROIDISM DUE TO HASHIMOTO'S THYROIDITIS: ICD-10-CM

## 2020-07-17 DIAGNOSIS — R21 RASH: ICD-10-CM

## 2020-07-17 DIAGNOSIS — E66.01 MORBID OBESITY (H): ICD-10-CM

## 2020-07-17 DIAGNOSIS — F41.1 GENERALIZED ANXIETY DISORDER: ICD-10-CM

## 2020-07-17 DIAGNOSIS — R76.8 ANTI-TPO ANTIBODIES PRESENT: ICD-10-CM

## 2020-07-17 DIAGNOSIS — I10 HYPERTENSION GOAL BP (BLOOD PRESSURE) < 140/90: ICD-10-CM

## 2020-07-17 PROCEDURE — 99397 PER PM REEVAL EST PAT 65+ YR: CPT | Performed by: INTERNAL MEDICINE

## 2020-07-17 PROCEDURE — 99213 OFFICE O/P EST LOW 20 MIN: CPT | Mod: 25 | Performed by: INTERNAL MEDICINE

## 2020-07-17 RX ORDER — NYSTATIN 100000 [USP'U]/G
POWDER TOPICAL 2 TIMES DAILY
Qty: 60 G | Refills: 1 | Status: SHIPPED | OUTPATIENT
Start: 2020-07-17 | End: 2022-11-14 | Stop reason: DRUGHIGH

## 2020-07-17 RX ORDER — LEVOTHYROXINE SODIUM 25 UG/1
TABLET ORAL
Qty: 90 TABLET | Refills: 3 | COMMUNITY
Start: 2020-07-17 | End: 2021-01-18

## 2020-07-17 RX ORDER — LOSARTAN POTASSIUM 50 MG/1
50 TABLET ORAL DAILY
Qty: 90 TABLET | Refills: 1 | Status: SHIPPED | OUTPATIENT
Start: 2020-07-17 | End: 2021-01-18

## 2020-07-17 RX ORDER — CITALOPRAM HYDROBROMIDE 20 MG/1
30 TABLET ORAL DAILY
Qty: 135 TABLET | Refills: 1 | Status: SHIPPED | OUTPATIENT
Start: 2020-07-17 | End: 2021-01-18

## 2020-07-17 RX ORDER — CHOLECALCIFEROL (VITAMIN D3) 50 MCG
1 TABLET ORAL DAILY
COMMUNITY
Start: 2020-07-17

## 2020-07-17 ASSESSMENT — MIFFLIN-ST. JEOR: SCORE: 1324.68

## 2020-07-17 ASSESSMENT — ACTIVITIES OF DAILY LIVING (ADL): CURRENT_FUNCTION: NO ASSISTANCE NEEDED

## 2020-07-17 NOTE — PROGRESS NOTES
"SUBJECTIVE:   Angela Story is a 70 year old female who presents for Preventive Visit.      Are you in the first 12 months of your Medicare coverage?  No    Healthy Habits:    In general, how would you rate your overall health?  Very good    Frequency of exercise:  4-5 days/week    Duration of exercise:  30-45 minutes    Do you usually eat at least 4 servings of fruit and vegetables a day, include whole grains    & fiber and avoid regularly eating high fat or \"junk\" foods?  Yes    Taking medications regularly:  Yes    Barriers to taking medications:  None    Medication side effects:  None    Ability to successfully perform activities of daily living:  No assistance needed    Home Safety:  No safety concerns identified    Hearing Impairment:  No hearing concerns    In the past 6 months, have you been bothered by leaking of urine?  No    In general, how would you rate your overall mental or emotional health?  Very good      PHQ-2 Total Score:    Additional concerns today:  No    Do you feel safe in your environment? Yes    Have you ever done Advance Care Planning? (For example, a Health Directive, POLST, or a discussion with a medical provider or your loved ones about your wishes): Yes, advance care planning is on file.      Fall risk  Fallen 2 or more times in the past year?: No  Any fall with injury in the past year?: No    Cognitive Screening   1) Repeat 3 items (Leader, Season, Table)    2) Clock draw: NORMAL  3) 3 item recall: Recalls 3 objects  Results: 3 items recalled: COGNITIVE IMPAIRMENT LESS LIKELY    Mini-CogTM Copyright ROSARIO Guerrero. Licensed by the author for use in [facility]; reprinted with permission (fifi@.[facility]). All rights reserved.      Do you have sleep apnea, excessive snoring or daytime drowsiness?: no    Reviewed and updated as needed this visit by clinical staff  Tobacco  Allergies  Meds  Problems         Reviewed and updated as needed this visit by Provider  Allergies  Meds  " "Problems        Social History     Tobacco Use     Smoking status: Never Smoker     Smokeless tobacco: Never Used   Substance Use Topics     Alcohol use: Yes     Comment: 1 per week     If you drink alcohol do you typically have >3 drinks per day or >7 drinks per week? No    Alcohol Use 7/17/2020   Prescreen: >3 drinks/day or >7 drinks/week? -   Prescreen: >3 drinks/day or >7 drinks/week? No               Current providers sharing in care for this patient include:   Patient Care Team:  Merary Geller MD as PCP - General (Internal Medicine)  Satish Scott as Other (see comments) (Therapist)  Raphael Aguero MD as MD (Ophthalmology)  Anil Iyer MD as Assigned PCP    The following health maintenance items are reviewed in Epic and correct as of today:  Health Maintenance   Topic Date Due     MEDICARE ANNUAL WELLNESS VISIT  04/22/2020     MICROALBUMIN  04/22/2020     FALL RISK ASSESSMENT  04/22/2020     INFLUENZA VACCINE (1) 09/01/2020     MAMMO SCREENING  01/21/2021     BMP  06/23/2021     LIPID  06/23/2021     TSH W/FREE T4 REFLEX  06/23/2021     ADVANCE CARE PLANNING  12/07/2021     DTAP/TDAP/TD IMMUNIZATION (3 - Td) 07/24/2027     COLORECTAL CANCER SCREENING  03/27/2028     DEXA  Completed     HEPATITIS C SCREENING  Completed     PHQ-2  Completed     PNEUMOCOCCAL IMMUNIZATION 65+ LOW/MEDIUM RISK  Completed     ZOSTER IMMUNIZATION  Completed     IPV IMMUNIZATION  Aged Out     MENINGITIS IMMUNIZATION  Aged Out     HEPATITIS B IMMUNIZATION  Aged Out     Labs reviewed in EPIC      Review of Systems  Constitutional, HEENT, cardiovascular, pulmonary, GI, , musculoskeletal, neuro, skin, endocrine and psych systems are negative, except as otherwise noted.  Per patient citalopram was to lower Blood Pressure as anxiety was contributing factor.    OBJECTIVE:   /70   Pulse 72   Temp 97.1  F (36.2  C) (Temporal)   Ht 1.549 m (5' 1\")   Wt 85.7 kg (189 lb)   LMP 07/23/2004   SpO2 96%   BMI " "35.71 kg/m   Estimated body mass index is 35.71 kg/m  as calculated from the following:    Height as of this encounter: 1.549 m (5' 1\").    Weight as of this encounter: 85.7 kg (189 lb).  Physical Exam  GENERAL: healthy, alert and no distress  EYES: Eyes grossly normal to inspection, PERRL and conjunctivae and sclerae normal  HENT: ear canals and TM's normal, nose and mouth without ulcers or lesions  NECK: no adenopathy,   RESP: lungs clear to auscultation - no rales, rhonchi or wheezes  BREAST: normal without masses, tenderness or nipple discharge and no palpable axillary masses or adenopathy  She rash under both breasts consists of fungal infection   CV: regular rate and rhythm, normal S1 S2, no S3 or S4, no murmur, click or rub, she has  peripheral edema and peripheral pulses strong  ABDOMEN: soft, nontender, no hepatosplenomegaly, no masses and bowel sounds normal  MS: no gross musculoskeletal defects noted, she has  edema  SKIN: no suspicious lesions or rashes  She has freckles and some moles  NEURO: Normal strength and tone, mentation intact and speech normal  PSYCH: mentation appears normal, affect normal/bright        ASSESSMENT / PLAN:   Angela was seen today for physical.    Diagnoses and all orders for this visit:    Encounter for Medicare annual wellness exam  Preventive health counseling was also done.    Hypothyroidism due to Hashimoto's thyroiditis  Follows endocrinology   She discussed the results with her endocrinologist and he recommended 50 mcg alternating with 25  She did not increase it to 50 mcg as recommended by me as she says that she is very sensitive to change in dosage  She sent my chart message to her endocrinologist  She is a scheduled for repeat testing    Anti-TPO antibodies present    Morbid obesity (H)  She is working on healthy eating and regular physical activity    CKD (chronic kidney disease) stage 3, GFR 30-59 ml/min (H)  Stable  Patient wants to discontinue calcitriol  She will " "continue vitamin D 2000 international unit daily  Angela was seen today for physical.      Generalized anxiety disorder  -     citalopram (CELEXA) 20 MG tablet; Take 1.5 tablets (30 mg) by mouth daily Dose is changed   patient is on 40 mg she will lower it to 30 mg and then to 20 mg  Patient verbalized the understanding    Hypertension goal BP (blood pressure) < 140/90  -     losartan (COZAAR) 50 MG tablet; Take 1 tablet (50 mg) by mouth daily for Blood Pressure    Preventive measure  -     vitamin D3 (CHOLECALCIFEROL) 50 mcg (2000 units) tablet; Take 1 tablet (50 mcg) by mouth daily  She discontinued calcitriol    Rash due to candidiasis  Comments:  under breast  Orders:  -     nystatin (MYCOSTATIN) 171250 UNIT/GM external powder; Apply topically 2 times daily        COUNSELING:  Reviewed preventive health counseling, as reflected in patient instructions       Regular exercise       Healthy diet/nutrition    Estimated body mass index is 35.71 kg/m  as calculated from the following:    Height as of this encounter: 1.549 m (5' 1\").    Weight as of this encounter: 85.7 kg (189 lb).    Weight management plan: Discussed healthy diet and exercise guidelines     reports that she has never smoked. She has never used smokeless tobacco.      Appropriate preventive services were discussed with this patient, including applicable screening as appropriate for cardiovascular disease, diabetes, osteopenia/osteoporosis, and glaucoma.  As appropriate for age/gender, discussed screening for colorectal cancer, prostate cancer, breast cancer, and cervical cancer. Checklist reviewing preventive services available has been given to the patient.    Reviewed patients plan of care and provided an AVS. The Basic Care Plan (routine screening as documented in Health Maintenance) for Angela meets the Care Plan requirement. This Care Plan has been established and reviewed with the Patient.    Counseling Resources:  ATP IV Guidelines  Pooled " Cohorts Equation Calculator  Breast Cancer Risk Calculator  FRAX Risk Assessment  ICSI Preventive Guidelines  Dietary Guidelines for Americans, 2010  USDA's MyPlate  ASA Prophylaxis  Lung CA Screening    Merary Geller MD  [facility]    Identified Health Risks:

## 2020-07-17 NOTE — PATIENT INSTRUCTIONS
You are due for mammogram.  Please call the following number to make appointment :  [phone]  It is located in [address]  Get vit D level done with your next lab appointment .  Discontinue calcitriol  Lower the dose of citalopram from 40 mg to 30 mg daily  If you desire then you can go to 20 mg daily after 3-4 weeks  Monitor your blood pressure once a week  at home.  Bring those readings on your next visit.  Notify us if your blood pressure readings consistently stays greater than 140/90.    Follow up in 6 months      Patient Education   Personalized Prevention Plan  You are due for the preventive services outlined below.  Your care team is available to assist you in scheduling these services.  If you have already completed any of these items, please share that information with your care team to update in your medical record.  Health Maintenance Due   Topic Date Due     Annual Wellness Visit  04/22/2020     Kidney Microalbumin Urine Test  04/22/2020     FALL RISK ASSESSMENT  04/22/2020     Preventive Health Recommendations    See your health care provider every year to    Review health changes.     Discuss preventive care.      Review your medicines if your doctor has prescribed any.    You no longer need a yearly Pap test unless you've had an abnormal Pap test in the past 10 years. If you have vaginal symptoms, such as bleeding or discharge, be sure to talk with your provider about a Pap test.    Every 1 to 2 years, have a mammogram.  If you are over 69, talk with your health care provider about whether or not you want to continue having screening mammograms.    Every 10 years, have a colonoscopy. Or, have a yearly FIT test (stool test). These exams will check for colon cancer.     Have a cholesterol test every 5 years, or more often if your doctor advises it.     Have a diabetes test (fasting glucose) every three years. If you are at risk for diabetes, you should have this test more often.     At age 65, have  a bone density scan (DEXA) to check for osteoporosis (brittle bone disease).    Shots:    Get a flu shot each year.    Get a tetanus shot every 10 years.    Talk to your doctor about your pneumonia vaccines. There are now two you should receive - Pneumovax (PPSV 23) and Prevnar (PCV 13).    Talk to your pharmacist about the shingles vaccine.    Talk to your doctor about the hepatitis B vaccine.    Nutrition:     Eat at least 5 servings of fruits and vegetables each day.    Eat whole-grain bread, whole-wheat pasta and brown rice instead of white grains and rice.    Get adequate Calcium and Vitamin D.     Lifestyle    Exercise at least 150 minutes a week (30 minutes a day, 5 days a week). This will help you control your weight and prevent disease.    Limit alcohol to one drink per day.    No smoking.     Wear sunscreen to prevent skin cancer.     See your dentist twice a year for an exam and cleaning.    See your eye doctor every 1 to 2 years to screen for conditions such as glaucoma, macular degeneration and cataracts.    Personalized Prevention Plan  You are due for the preventive services outlined below.  Your care team is available to assist you in scheduling these services.  If you have already completed any of these items, please share that information with your care team to update in your medical record.  Health Maintenance Due   Topic Date Due     Annual Wellness Visit  04/22/2020     Kidney Microalbumin Urine Test  04/22/2020     FALL RISK ASSESSMENT  04/22/2020

## 2020-08-03 ENCOUNTER — HOSPITAL ENCOUNTER (OUTPATIENT)
Dept: MAMMOGRAPHY | Facility: CLINIC | Age: 70
Discharge: HOME | End: 2020-08-03
Attending: INTERNAL MEDICINE | Admitting: INTERNAL MEDICINE
Payer: COMMERCIAL

## 2020-08-03 DIAGNOSIS — Z12.31 SCREENING MAMMOGRAM, ENCOUNTER FOR: ICD-10-CM

## 2020-08-03 PROCEDURE — 77067 SCR MAMMO BI INCL CAD: CPT

## 2020-11-13 ENCOUNTER — TELEPHONE (OUTPATIENT)
Dept: OPHTHALMOLOGY | Facility: CLINIC | Age: 70
End: 2020-11-13

## 2020-11-16 ENCOUNTER — OFFICE VISIT (OUTPATIENT)
Dept: OPHTHALMOLOGY | Facility: CLINIC | Age: 70
End: 2020-11-16
Attending: OPHTHALMOLOGY
Payer: COMMERCIAL

## 2020-11-16 DIAGNOSIS — H02.834 DERMATOCHALASIS OF BOTH UPPER EYELIDS: ICD-10-CM

## 2020-11-16 DIAGNOSIS — H02.831 DERMATOCHALASIS OF BOTH UPPER EYELIDS: ICD-10-CM

## 2020-11-16 DIAGNOSIS — E07.9 THYROID EYE DISEASE: Primary | ICD-10-CM

## 2020-11-16 DIAGNOSIS — H57.89 THYROID EYE DISEASE: Primary | ICD-10-CM

## 2020-11-16 PROCEDURE — G0463 HOSPITAL OUTPT CLINIC VISIT: HCPCS

## 2020-11-16 PROCEDURE — 92012 INTRM OPH EXAM EST PATIENT: CPT | Mod: GC | Performed by: OPHTHALMOLOGY

## 2020-11-16 ASSESSMENT — TONOMETRY
OD_IOP_MMHG: 15
IOP_METHOD: ICARE
OS_IOP_MMHG: 15

## 2020-11-16 ASSESSMENT — REFRACTION_WEARINGRX
OS_CYLINDER: +1.00
OS_SPHERE: -4.00
OD_AXIS: 180
OD_ADD: +2.75
OD_SPHERE: -4.50
OS_AXIS: 180
OD_CYLINDER: +2.00
SPECS_TYPE: TRIFOCAL
OS_ADD: +2.75

## 2020-11-16 ASSESSMENT — VISUAL ACUITY
OD_CC: 20/20
CORRECTION_TYPE: GLASSES
OS_CC: 20/20
METHOD: SNELLEN - LINEAR

## 2020-11-16 ASSESSMENT — CUP TO DISC RATIO
OS_RATIO: 0.2
OD_RATIO: 0.2

## 2020-11-16 NOTE — NURSING NOTE
Chief Complaint(s) and History of Present Illness(es)     Thyroid Disease     Laterality: both eyes    Associated symptoms: Negative for eye pain, redness and tearing              Comments     Since last visit, Angela had HAs for 5 weeks on the roll, wondering if due to HAYDEE. HAs improved on their own. She feels like vision is a little distorted. Uses AT once in a while.   No diplopia

## 2020-11-16 NOTE — NURSING NOTE
Chief Complaint(s) and History of Present Illness(es)     Thyroid Disease     Laterality: both eyes    Associated symptoms: Negative for eye pain, redness and tearing              Comments     Since last visit, Angela had HAs for 5 weeks on the roll, wondering if due to HAYDEE. HAs improved on their own. She feels like vision is a little distorted. Uses AT once in a while.   Angela has been seeing a nutritionist who took her off gluten, per pt,  due to the association between gluten and thyroid. Pt has been feeling much better since.  No diplopia

## 2020-11-16 NOTE — PROGRESS NOTES
Assessment & Plan     HPI: Patient is a 69 year old female diagnosed with Hashimoto's thyroiditis in June 2019 and started on levothyroxine. In early November 2019 she notes that she was out walking in the cold and had a upper respiratory infection at night and woke up with swelling of both shala-orbital areas. She saw a NP and was told she had allergies and was told to take Claritin. She saw an ophthalmologist at [facility] and was told again that she had allergies and told her to start Zyrtec, flonase, and anti-allergy drops. She did not have any improvement in her symptoms. She was seen by a family practice provider and found to be hyperthyroid based upon TSH level. She saw Dr. Sanabria and was then referred. She last had shala-ocular swelling around November 1st.      Interval history: Had an episode of low-grade headache that persisted for about 6 weeks starting in August.  Resolved spontaneously but found no attributable cause.  However, after going more gluten-free diet around October, she did start feeling better all-around as well as her eye symptoms and headaches.    She is on levothyroxine 25mcg daily since 12/2019 (was previously on 50mcg daily). Non-smoker.    Referring physician:  Dr. Sanabria     Thyroid history:  Diagnosed when? June 2019 with Hashimoto's  BROOKS: NA  Thyroidectomy: NA     TSI (date): 410 % baseline (normal < 140%) (12/2019)    Previous results:NA    Eye symptoms (since 1/2020):   Proptosis (better/worse/same since last visit): None  Diplopia(better/worse/same since last visit): None  Eyelid retraction(better/worse/same since last visit): None  Tearing(better/worse/same since last visit): Same  Redness (better/worse/same since last visit): Better  Pain ((better/worse/same since last visit): None  Pain to move the eyes (better/worse/same since last visit): None  Blurred vision: None     Ocular history:   Orbital decompression (date, details): NA  Strabismus surgery (date,  details): NA  Eyelid surgery (date, details): NA    Exam:   Loulou (base):19/19@100     Better/worse same: same  Strabismus (better/worse/same): same  Eyelid retraction (better/worse/same): none    ABDULLAHI:  1. Spontaneous orbital pain.     no  2. Gaze evoked orbital pain.     no  3. Eyelid swelling due to active thyroid eye disease  no  4. Eyelid erythema.       no  5. Conjunctival redness due to active thyroid eye disease . no  6. Chemosis.        no  7. Inflammation of caruncle OR plica.    no    Patients assessed after follow-up can be scored out of 10 by  including items 8-10.    8. Increase of > 2mm in proptosis.    no   9. Decrease in uniocular excursion in any direction of > 8 . no  10. Decrease of acuity equivalent to 1 Snellen line.  no    ABDULLAHI SCORE = 0/10       Angela Story is a 70 year old female with the following diagnoses:   1. Thyroid eye disease - Both Eyes         Angela is a 69 year old female with a history of Hashimotos diagnosed in 6/2019 who presents for f/u thyroid eye disease. At her initial visit in thyroid eye disease clinic in 1/2020 she had significant shala-ocular edema and elevated TSI consistent with hypothyroid active thyroid eye disease.     Today she is here for follow-up, last visit 5/2020 doing well overall.  Aside from low-grade headaches in August for several weeks, she has been stable.  Exam today is very stable. Strabismus exam stable, Loulou stable, no diplopia. No optic neuropathy.  No active inflammation, ABDULLAHI = 0 today.     Plan:  -Continue follow up with endocrinology  -Continue selenium 200mcg daily  -ATs as needed  -Continue to avoid smoke  -She would like to continue with her gluten-free diet for about 6 months and then will return for possible bleph evaluation  -RTC 6 months      Arash Adamson MD/MPH  Oculoplastic Surgery Fellow  11/16/2020 at 3:00 PM       Attending Physician Attestation:  Complete documentation of historical and exam elements from today's  encounter can be found in the full encounter summary report (not reduplicated in this progress note).  I personally obtained the chief complaint(s) and history of present illness.  I confirmed and edited as necessary the review of systems, past medical/surgical history, family history, social history, and examination findings as documented by others; and I examined the patient myself.  I personally reviewed the relevant tests, images, and reports as documented above.  I formulated and edited as necessary the assessment and plan and discussed the findings and management plan with the patient and family. I personally reviewed the ophthalmic test(s) associated with this encounter, agree with the interpretation(s) as documented by the resident/fellow, and have edited the corresponding report(s) as necessary.  - Raphael Aguero MD

## 2020-12-01 DIAGNOSIS — E03.9 HYPOTHYROIDISM, UNSPECIFIED TYPE: ICD-10-CM

## 2020-12-01 DIAGNOSIS — K90.41 GLUTEN INTOLERANCE: ICD-10-CM

## 2020-12-01 PROCEDURE — 36415 COLL VENOUS BLD VENIPUNCTURE: CPT | Performed by: INTERNAL MEDICINE

## 2020-12-01 PROCEDURE — 84443 ASSAY THYROID STIM HORMONE: CPT | Performed by: INTERNAL MEDICINE

## 2020-12-01 PROCEDURE — 83516 IMMUNOASSAY NONANTIBODY: CPT | Mod: 59 | Performed by: INTERNAL MEDICINE

## 2020-12-02 DIAGNOSIS — E06.3 HYPOTHYROIDISM DUE TO HASHIMOTO'S THYROIDITIS: ICD-10-CM

## 2020-12-02 DIAGNOSIS — R76.8 ANTI-TPO ANTIBODIES PRESENT: ICD-10-CM

## 2020-12-02 LAB
TSH SERPL DL<=0.005 MIU/L-ACNC: 1.92 MU/L (ref 0.4–4)
TTG IGA SER-ACNC: <1 U/ML
TTG IGG SER-ACNC: <1 U/ML

## 2020-12-02 RX ORDER — LEVOTHYROXINE SODIUM 25 UG/1
TABLET ORAL
Qty: 90 TABLET | Refills: 3 | Status: CANCELLED | OUTPATIENT
Start: 2020-12-02

## 2020-12-03 NOTE — RESULT ENCOUNTER NOTE
Jessica Miller,    This is to inform you regarding your test result.    TSH which is thyroid hormone is normal.  Your blood test for celiac disease is negative .    Sincerely,      Dr.Nasima Yimi MD,FACP

## 2021-01-18 ENCOUNTER — OFFICE VISIT (OUTPATIENT)
Dept: FAMILY MEDICINE | Facility: CLINIC | Age: 71
End: 2021-01-18
Payer: COMMERCIAL

## 2021-01-18 VITALS
OXYGEN SATURATION: 97 % | HEIGHT: 61 IN | BODY MASS INDEX: 33.23 KG/M2 | TEMPERATURE: 96.9 F | SYSTOLIC BLOOD PRESSURE: 122 MMHG | DIASTOLIC BLOOD PRESSURE: 76 MMHG | WEIGHT: 176 LBS | HEART RATE: 70 BPM

## 2021-01-18 DIAGNOSIS — I10 HYPERTENSION GOAL BP (BLOOD PRESSURE) < 140/90: ICD-10-CM

## 2021-01-18 DIAGNOSIS — N18.31 STAGE 3A CHRONIC KIDNEY DISEASE (H): ICD-10-CM

## 2021-01-18 DIAGNOSIS — E78.5 HYPERLIPIDEMIA LDL GOAL <100: ICD-10-CM

## 2021-01-18 DIAGNOSIS — Z13.0 SCREENING FOR DEFICIENCY ANEMIA: ICD-10-CM

## 2021-01-18 DIAGNOSIS — E06.3 HYPOTHYROIDISM DUE TO HASHIMOTO'S THYROIDITIS: ICD-10-CM

## 2021-01-18 DIAGNOSIS — R73.09 ELEVATED GLUCOSE: ICD-10-CM

## 2021-01-18 DIAGNOSIS — R76.8 ANTI-TPO ANTIBODIES PRESENT: ICD-10-CM

## 2021-01-18 DIAGNOSIS — F41.1 GENERALIZED ANXIETY DISORDER: Primary | ICD-10-CM

## 2021-01-18 PROBLEM — E66.01 MORBID OBESITY (H): Status: RESOLVED | Noted: 2019-11-18 | Resolved: 2021-01-18

## 2021-01-18 LAB
ALBUMIN SERPL-MCNC: 4 G/DL (ref 3.4–5)
ALP SERPL-CCNC: 72 U/L (ref 40–150)
ALT SERPL W P-5'-P-CCNC: 21 U/L (ref 0–50)
ANION GAP SERPL CALCULATED.3IONS-SCNC: 4 MMOL/L (ref 3–14)
AST SERPL W P-5'-P-CCNC: 18 U/L (ref 0–45)
BILIRUB SERPL-MCNC: 0.8 MG/DL (ref 0.2–1.3)
BUN SERPL-MCNC: 15 MG/DL (ref 7–30)
CALCIUM SERPL-MCNC: 9.4 MG/DL (ref 8.5–10.1)
CHLORIDE SERPL-SCNC: 110 MMOL/L (ref 94–109)
CHOLEST SERPL-MCNC: 179 MG/DL
CO2 SERPL-SCNC: 27 MMOL/L (ref 20–32)
CREAT SERPL-MCNC: 1.1 MG/DL (ref 0.52–1.04)
ERYTHROCYTE [DISTWIDTH] IN BLOOD BY AUTOMATED COUNT: 12.7 % (ref 10–15)
GFR SERPL CREATININE-BSD FRML MDRD: 51 ML/MIN/{1.73_M2}
GLUCOSE SERPL-MCNC: 112 MG/DL (ref 70–99)
HBA1C MFR BLD: 5.8 % (ref 0–5.6)
HCT VFR BLD AUTO: 42.8 % (ref 35–47)
HDLC SERPL-MCNC: 61 MG/DL
HGB BLD-MCNC: 14.5 G/DL (ref 11.7–15.7)
LDLC SERPL CALC-MCNC: 92 MG/DL
MCH RBC QN AUTO: 30.5 PG (ref 26.5–33)
MCHC RBC AUTO-ENTMCNC: 33.9 G/DL (ref 31.5–36.5)
MCV RBC AUTO: 90 FL (ref 78–100)
NONHDLC SERPL-MCNC: 118 MG/DL
PLATELET # BLD AUTO: 179 10E9/L (ref 150–450)
POTASSIUM SERPL-SCNC: 4.6 MMOL/L (ref 3.4–5.3)
PROT SERPL-MCNC: 7.2 G/DL (ref 6.8–8.8)
RBC # BLD AUTO: 4.75 10E12/L (ref 3.8–5.2)
SODIUM SERPL-SCNC: 141 MMOL/L (ref 133–144)
TRIGL SERPL-MCNC: 130 MG/DL
WBC # BLD AUTO: 6 10E9/L (ref 4–11)

## 2021-01-18 PROCEDURE — 82728 ASSAY OF FERRITIN: CPT | Performed by: INTERNAL MEDICINE

## 2021-01-18 PROCEDURE — 85027 COMPLETE CBC AUTOMATED: CPT | Performed by: INTERNAL MEDICINE

## 2021-01-18 PROCEDURE — 36415 COLL VENOUS BLD VENIPUNCTURE: CPT | Performed by: INTERNAL MEDICINE

## 2021-01-18 PROCEDURE — 83036 HEMOGLOBIN GLYCOSYLATED A1C: CPT | Performed by: INTERNAL MEDICINE

## 2021-01-18 PROCEDURE — 80061 LIPID PANEL: CPT | Performed by: INTERNAL MEDICINE

## 2021-01-18 PROCEDURE — 80053 COMPREHEN METABOLIC PANEL: CPT | Performed by: INTERNAL MEDICINE

## 2021-01-18 PROCEDURE — 99213 OFFICE O/P EST LOW 20 MIN: CPT | Performed by: INTERNAL MEDICINE

## 2021-01-18 RX ORDER — PRAVASTATIN SODIUM 20 MG
20 TABLET ORAL DAILY
Qty: 90 TABLET | Refills: 3 | Status: SHIPPED | OUTPATIENT
Start: 2021-01-18 | End: 2021-07-19

## 2021-01-18 RX ORDER — CITALOPRAM HYDROBROMIDE 10 MG/1
10 TABLET ORAL DAILY
Qty: 90 TABLET | Refills: 1 | Status: SHIPPED | OUTPATIENT
Start: 2021-01-18 | End: 2021-07-19

## 2021-01-18 RX ORDER — LOSARTAN POTASSIUM 50 MG/1
50 TABLET ORAL DAILY
Qty: 90 TABLET | Refills: 3 | Status: SHIPPED | OUTPATIENT
Start: 2021-01-18 | End: 2021-07-19

## 2021-01-18 RX ORDER — LEVOTHYROXINE SODIUM 25 UG/1
TABLET ORAL
Qty: 90 TABLET | Refills: 3 | COMMUNITY
Start: 2021-01-18 | End: 2021-07-19

## 2021-01-18 ASSESSMENT — MIFFLIN-ST. JEOR: SCORE: 1265.71

## 2021-01-18 NOTE — PATIENT INSTRUCTIONS
Labs today  Follow up in 6 months  Seek sooner medical attention if there is any worsening of symptoms or problems.

## 2021-01-18 NOTE — PROGRESS NOTES
Angela was seen today for follow up.    Diagnoses and all orders for this visit:    Patient is otherwise doing well working on healthy diet and exercise and has lost weight      Generalized anxiety disorder  -     citalopram (CELEXA) 10 MG tablet; Take 1 tablet (10 mg) by mouth daily  Was on 30 mg and weaning herself but noticing that symptoms are coming back  I told her to stay on 10 mg if that keeps her symptoms under control  With 5 mg she noticed symptoms were coming back    Hypertension goal BP (blood pressure) < 140/90  -     losartan (COZAAR) 50 MG tablet; Take 1 tablet (50 mg) by mouth daily for Blood Pressure    Stage 3a chronic kidney disease  -     Comprehensive metabolic panel  Educated her about chronic kidney disease  Her numbers are really good so don't get alarmed    Hyperlipidemia LDL goal <100  -     pravastatin (PRAVACHOL) 20 MG tablet; Take 1 tablet (20 mg) by mouth daily for cholestrol  -     Lipid panel reflex to direct LDL Fasting    Elevated glucose  -     Hemoglobin A1c    Screening for deficiency anemia  -     CBC with platelets  -     Ferritin    Hypothyroidism due to Hashimoto's thyroiditis  It is taking 25 mg 1-1/2 tablet  Followed by endocrinology  Her endocrinologist is retiring so she is going to find a new one  Last TSH was in December and was normal      Anti-TPO antibodies present  See the note above        Subjective     Angela is a 70 year old who presents to clinic today for the following health issues     HPI       Weaning  citalopram  Wants to stay on 10 mg   Endocrinologist is retired  She will find another one  She is working on healthy diet and exercise    Review of Systems   Constitutional, HEENT, cardiovascular, pulmonary, GI, , musculoskeletal, neuro, skin, endocrine and psych systems are negative, except as otherwise noted.  Lost weight intentionally      Objective    /76 (BP Location: Left arm, Patient Position: Chair, Cuff Size: Adult Large)   Pulse 70    "Temp 96.9  F (36.1  C) (Temporal)   Ht 1.549 m (5' 1\")   Wt 79.8 kg (176 lb)   LMP 07/23/2004   SpO2 97%   BMI 33.25 kg/m    Body mass index is 33.25 kg/m .  Physical Exam       GENERAL APPEARANCE: healthy, alert and no distress  EYES: Eyes grossly normal to inspection, PERRL and conjunctivae and sclerae normal  HENT: ear canals and TM's normal and nose and mouth without ulcers or lesions  NECK: no adenopathy  RESP: lungs clear to auscultation - no rales, rhonchi or wheezes  CV: regular rates and rhythm, normal S1 S2, no S3    Orders Only on 12/01/2020   Component Date Value Ref Range Status     TSH 12/01/2020 1.92  0.40 - 4.00 mU/L Final     Tissue Transglutaminase Antibody I* 12/01/2020 <1  <7 U/mL Final    Comment: Negative  The tTG-IgA assay has limited utility for patients with decreased levels of   IgA. Screening for celiac disease should include IgA testing to rule out   selective IgA deficiency and to guide selection and interpretation of   serological testing. tTG-IgG testing may be positive in celiac disease   patients with IgA deficiency.       Tissue Transglutaminase Lexie IgG 12/01/2020 <1  <7 U/mL Final    Negative     Her chart was reviewed and labs were reviewed  Preventive health counseling was also done.    Disclaimer: This note consists of symbols derived from keyboarding, dictation and/or voice recognition software. As a result, there may be errors in the script that have gone undetected. Please consider this when interpreting information found in this chart.              "

## 2021-01-19 LAB — FERRITIN SERPL-MCNC: 156 NG/ML (ref 8–252)

## 2021-01-20 DIAGNOSIS — N18.31 STAGE 3A CHRONIC KIDNEY DISEASE (H): Primary | ICD-10-CM

## 2021-01-20 NOTE — RESULT ENCOUNTER NOTE
Jessica Miller,    This is to inform you regarding your test result.    your creatinine is slightly up   BUN is normal.   This could be due to dehydration   Recheck kidney function in 4-6 weeks   Stay well hydrated   Avoid OTC NSAIDS like ibuprofen, motrin and aleve .  Make lab only appointment   Stay well hydrated   Your total cholesterol is normal.   HDL which is called good cholesterol is normal.   Your LDL cholesterol is normal.  This is often call bad cholesterol and high levels increase the risk for heart attacks and strokes.   Your triglycerides are normal.    HbA1c which is average glucose during last 3 months is 5.8%.   You are considered prediabetic.  CBC result which includes white count Hemoglobin and  Platelet Counts is normal.   Eat low cholesterol low fat  diet and do regular physical activity.  Ferritin which is iron stores in the body is normal.      Sincerely,      Dr.Nasima Yimi MD,FACP

## 2021-02-16 NOTE — TELEPHONE ENCOUNTER
FUTURE VISIT INFORMATION      FUTURE VISIT INFORMATION:    Date: 5.17.21    Time: 9:00 AM    Location: [facility]  REFERRAL INFORMATION:    Referring provider:  N/A    Referring providers clinic:  N/A    Reason for visit/diagnosis: Return in about 6 months (around 5/16/2021) for [facility] clinic    RECORDS REQUESTED FROM:       Clinic name Comments Records Status Imaging Status   [facility] 11.16.20 Dr Raphael Aguero  5.18.20 1.27.20 Internal    [facility] 11.29.20 Dr Anil Iyer   11.18.19 Heather Chakraborty  Internal

## 2021-02-25 DIAGNOSIS — N18.31 STAGE 3A CHRONIC KIDNEY DISEASE (H): ICD-10-CM

## 2021-02-25 LAB
ALBUMIN SERPL-MCNC: 4.3 G/DL (ref 3.4–5)
ALP SERPL-CCNC: 85 U/L (ref 40–150)
ALT SERPL W P-5'-P-CCNC: 24 U/L (ref 0–50)
ANION GAP SERPL CALCULATED.3IONS-SCNC: 3 MMOL/L (ref 3–14)
AST SERPL W P-5'-P-CCNC: 20 U/L (ref 0–45)
BILIRUB SERPL-MCNC: 0.5 MG/DL (ref 0.2–1.3)
BUN SERPL-MCNC: 17 MG/DL (ref 7–30)
CALCIUM SERPL-MCNC: 10 MG/DL (ref 8.5–10.1)
CHLORIDE SERPL-SCNC: 105 MMOL/L (ref 94–109)
CO2 SERPL-SCNC: 28 MMOL/L (ref 20–32)
CREAT SERPL-MCNC: 1 MG/DL (ref 0.52–1.04)
GFR SERPL CREATININE-BSD FRML MDRD: 58 ML/MIN/{1.73_M2}
GLUCOSE SERPL-MCNC: 103 MG/DL (ref 70–99)
POTASSIUM SERPL-SCNC: 4.8 MMOL/L (ref 3.4–5.3)
PROT SERPL-MCNC: 7.6 G/DL (ref 6.8–8.8)
SODIUM SERPL-SCNC: 136 MMOL/L (ref 133–144)

## 2021-02-25 PROCEDURE — 80053 COMPREHEN METABOLIC PANEL: CPT | Performed by: INTERNAL MEDICINE

## 2021-02-25 PROCEDURE — 36415 COLL VENOUS BLD VENIPUNCTURE: CPT | Performed by: INTERNAL MEDICINE

## 2021-02-25 NOTE — RESULT ENCOUNTER NOTE
Jessica Miller,    This is to inform you regarding your test result.    Basic metabolic panel which includes electrolytes and kidney function is satisfactory.  Glucose is slightly elevated      Sincerely,      Dr.Nasima Yimi MD,FACP

## 2021-03-09 ENCOUNTER — MYC MEDICAL ADVICE (OUTPATIENT)
Dept: FAMILY MEDICINE | Facility: CLINIC | Age: 71
End: 2021-03-09

## 2021-05-17 ENCOUNTER — PRE VISIT (OUTPATIENT)
Dept: OPHTHALMOLOGY | Facility: CLINIC | Age: 71
End: 2021-05-17

## 2021-05-17 ENCOUNTER — OFFICE VISIT (OUTPATIENT)
Dept: OPHTHALMOLOGY | Facility: CLINIC | Age: 71
End: 2021-05-17
Payer: COMMERCIAL

## 2021-05-17 DIAGNOSIS — H02.831 DERMATOCHALASIS OF BOTH UPPER EYELIDS: Primary | ICD-10-CM

## 2021-05-17 DIAGNOSIS — E07.9 THYROID EYE DISEASE: ICD-10-CM

## 2021-05-17 DIAGNOSIS — H02.403 PTOSIS OF BOTH UPPER EYELIDS: ICD-10-CM

## 2021-05-17 DIAGNOSIS — H57.89 THYROID EYE DISEASE: ICD-10-CM

## 2021-05-17 DIAGNOSIS — H02.834 DERMATOCHALASIS OF BOTH UPPER EYELIDS: Primary | ICD-10-CM

## 2021-05-17 PROCEDURE — 99214 OFFICE O/P EST MOD 30 MIN: CPT | Mod: GC | Performed by: OPHTHALMOLOGY

## 2021-05-17 PROCEDURE — 92285 EXTERNAL OCULAR PHOTOGRAPHY: CPT | Mod: GC | Performed by: OPHTHALMOLOGY

## 2021-05-17 PROCEDURE — 92082 INTERMEDIATE VISUAL FIELD XM: CPT | Mod: GC | Performed by: OPHTHALMOLOGY

## 2021-05-17 ASSESSMENT — VISUAL ACUITY
OD_CC: 20/20
OS_CC: 20/20
OS_CC+: -2
METHOD: SNELLEN - LINEAR
OD_CC+: -2
CORRECTION_TYPE: GLASSES

## 2021-05-17 ASSESSMENT — LEVATOR FUNCTION
OS_LEVATOR: 15
OD_LEVATOR: 15

## 2021-05-17 ASSESSMENT — MARGIN REFLEX DISTANCE
OS_MRD1: 2
OD_MRD1: 2

## 2021-05-17 ASSESSMENT — CONF VISUAL FIELD
OS_NORMAL: 1
METHOD: COUNTING FINGERS
OD_NORMAL: 1

## 2021-05-17 ASSESSMENT — REFRACTION_WEARINGRX
OS_SPHERE: -4.00
OD_SPHERE: -4.50
OD_CYLINDER: +2.00
OS_AXIS: 180
OD_AXIS: 180
OS_CYLINDER: +1.00
SPECS_TYPE: TRIFOCAL
OD_ADD: +2.75
OS_ADD: +2.75

## 2021-05-17 ASSESSMENT — TONOMETRY
OD_IOP_MMHG: 17
OS_IOP_MMHG: 13
IOP_METHOD: ICARE

## 2021-05-17 ASSESSMENT — LAGOPHTHALMOS
OS_LAGOPHTHALMOS: 0
OD_LAGOPHTHALMOS: 0

## 2021-05-17 ASSESSMENT — EXTERNAL EXAM - RIGHT EYE: OD_EXAM: NORMAL

## 2021-05-17 ASSESSMENT — EXTERNAL EXAM - LEFT EYE: OS_EXAM: NORMAL

## 2021-05-17 NOTE — PROGRESS NOTES
Chief Complaints and History of Present Illnesses   Patient presents with     Follow Up     Chief Complaint(s) and History of Present Illness(es)     Follow Up     In both eyes.  This started years ago.  Occurring constantly.  Since   onset it is gradually improving.  Associated symptoms include Negative for   double vision, eye pain and dryness.  Treatments tried include artificial   tears.  Pain was noted as 0/10.              Comments     Follow up for Thyroid eye disease. Pt feels eyes have improved. No   concerns. Would like updated glasses. No pain. AT PRN each eye.  Pt feels   lids are continuing to droop and would like lids assessed for surgery.    China Posey, COT COT 8:54 AM May 17, 2021               FUNCTIONAL COMPLAINTS RELATED TO DROOPY EYELIDS/BROWS:  Angela Story describes upper lids interfering with superior visual field and interfering with activities of daily living including reading, driving and watching television.     EXAM:   Dominant eye left    MRD1: Right eye 2   Left eye 2  Dermatochalasis with excess skin touching eyelashes   Brow ptosis with brow resting below superior orbital rim     VISUAL FIELD:  Right eye untaped:10 degrees Right eye taped:50 degrees  Left eye untaped:10 degrees Left eye taped:50 degrees    Right eye visual field improves by: 40 degrees  Left eye visual field improves by: 40 degrees              Assessment & Plan     Angela Story is a 70 year old female with the following diagnoses:   1. Dermatochalasis of both upper eyelids    2. Ptosis of both upper eyelids    3. Thyroid eye disease - Both Eyes       Referral from Dr. Raphael Aguero for blepharoplasty evaluation.  Dermatochalasis and ptosis, both of which are visually significant.  She would like to proceed with upper blepharoplasty and ptosis repair.      PLAN:  Bilateral upper blepharoplasty (S+sup orbic+NF)  Bilateral upper lids ptosis repair (MMCR 8/8)        Arash Adamson MD/MPH  Oculoplastic Surgery  Fellow  5/17/2021 at 9:18 AM    Attending Physician Attestation:  Complete documentation of historical and exam elements from today's encounter can be found in the full encounter summary report (not reduplicated in this progress note).  I personally obtained the chief complaint(s) and history of present illness.  I confirmed and edited as necessary the review of systems, past medical/surgical history, family history, social history, and examination findings as documented by others; and I examined the patient myself.  I personally reviewed the relevant tests, images, and reports as documented above.  I formulated and edited as necessary the assessment and plan and discussed the findings and management plan with the patient and family.  I personally reviewed the ophthalmic test(s) associated with this encounter, agree with the interpretation(s) as documented by the resident/fellow, and have edited the corresponding report(s) as necessary.   -Gregor Harris MD    Today with Angela Story , I reviewed the indications, risks, benefits, and alternatives of the proposed surgical procedure including, but not limited to, failure obtain the desired result  and need for additional surgery, bleeding, infection, loss of vision, loss of the eye, and the remote possibility of permanent damage to any organ system or death with the use of anesthesia.  I provided multiple opportunities for the questions, answered all questions to the best of my ability, and confirmed that my answers and my discussion were understood.     - Gregor Harris MD 9:28 AM 5/17/2021

## 2021-05-17 NOTE — NURSING NOTE
Chief Complaints and History of Present Illnesses   Patient presents with     Follow Up     Chief Complaint(s) and History of Present Illness(es)     Follow Up     Laterality: both eyes    Onset: years ago    Frequency: constantly    Course: gradually improving    Associated symptoms: Negative for double vision, eye pain and dryness    Treatments tried: artificial tears    Pain scale: 0/10              Comments     Follow up for Thyroid eye disease. Pt feels eyes have improved. No concerns. Would like updated glasses. No pain. AT PRN each eye.  Pt feels lids are continuing to droop and would like lids assessed for surgery.    China Posey, REAL COT 8:54 AM May 17, 2021

## 2021-05-17 NOTE — PATIENT INSTRUCTIONS
BLEPHAROPLASTY    Your eyes are often the first thing people notice about you and are an important aspect of your overall appearance. As we age, the tone and shape of our eyelids can loosen and sag. Heredity and sun exposure also contribute to this process. This excess, puffy or lax skin can make you appear more tired or appear older. Eyelid surgery or blepharoplasty (pronounced  tjbb-[id number]-plasty ) can give the eyes a more youthful look by removing excess skin, bulging fat, and lax muscle from the upper or lower eyelids. If the sagging upper eyelid skin obstructs peripheral vision, blepharoplasty can eliminate the obstruction and expand the visual field.     Upper Blepharoplasty     For the upper eyelids, excess skin and fat are removed through an incision hidden in the natural eyelid crease. If the lid is droopy (ptosis), the muscle that raises the upper eyelid can be tightened. The incision is then closed with fine sutures.     Lower Blepharoplasty     Fat in the lower eyelids can be removed or repositioned through an incision hidden on the inner surface of the eyelid.  If there is excessive skin in the lower lid, the skin can be removed through incision is made just below the lashes. Fat can be removed or repositioned through this incision, and the excess skin removed. The incision is then closed with fine sutures.     Upper and Lower Blepharoplasty     Upper and lower blepharoplasty can be performed together and also can be combined with other procedures such as eyebrow or forehead lift, midface lift, face lift, neck lift, or laser skin resurfacing.  The procedures are typically performed as an outpatient procedure and typically take 45 min to 1.5 hours to perform.  Most patients can return to normal activities within 1-2 weeks. Makeup may be worn to camouflage any bruising after one week.       Who Should Perform A Blepharoplasty?     When choosing a surgeon to perform blepharoplasty, look for a cosmetic and  reconstructive surgeon who specializes in the eyelids, orbit, and tear drain system. Dr. Harris s membership in the American Society of Ophthalmic Plastic and Reconstructive Surgery (ASOPRS) indicates he is not only a board certified ophthalmologist who knows the anatomy and structure of the eyelids and orbit, but also has had extensive training in ophthalmic plastic reconstructive and cosmetic surgery.

## 2021-05-31 DIAGNOSIS — Z11.59 ENCOUNTER FOR SCREENING FOR OTHER VIRAL DISEASES: ICD-10-CM

## 2021-06-28 ENCOUNTER — OFFICE VISIT (OUTPATIENT)
Dept: FAMILY MEDICINE | Facility: CLINIC | Age: 71
End: 2021-06-28
Payer: COMMERCIAL

## 2021-06-28 VITALS
SYSTOLIC BLOOD PRESSURE: 135 MMHG | DIASTOLIC BLOOD PRESSURE: 84 MMHG | OXYGEN SATURATION: 98 % | WEIGHT: 170 LBS | BODY MASS INDEX: 32.12 KG/M2 | TEMPERATURE: 97.9 F | HEART RATE: 63 BPM

## 2021-06-28 DIAGNOSIS — Z11.59 ENCOUNTER FOR SCREENING FOR OTHER VIRAL DISEASES: ICD-10-CM

## 2021-06-28 DIAGNOSIS — Z01.818 PRE-OPERATIVE EXAMINATION: Primary | ICD-10-CM

## 2021-06-28 DIAGNOSIS — E78.5 HYPERLIPIDEMIA LDL GOAL <100: ICD-10-CM

## 2021-06-28 DIAGNOSIS — H02.403 PTOSIS OF EYELID, BILATERAL: ICD-10-CM

## 2021-06-28 DIAGNOSIS — F41.1 GENERALIZED ANXIETY DISORDER: ICD-10-CM

## 2021-06-28 DIAGNOSIS — I10 BENIGN HYPERTENSION: ICD-10-CM

## 2021-06-28 DIAGNOSIS — E03.9 HYPOTHYROIDISM, UNSPECIFIED TYPE: ICD-10-CM

## 2021-06-28 LAB
LABORATORY COMMENT REPORT: NORMAL
SARS-COV-2 RNA RESP QL NAA+PROBE: NEGATIVE
SARS-COV-2 RNA RESP QL NAA+PROBE: NORMAL
SPECIMEN SOURCE: NORMAL
SPECIMEN SOURCE: NORMAL

## 2021-06-28 PROCEDURE — U0003 INFECTIOUS AGENT DETECTION BY NUCLEIC ACID (DNA OR RNA); SEVERE ACUTE RESPIRATORY SYNDROME CORONAVIRUS 2 (SARS-COV-2) (CORONAVIRUS DISEASE [COVID-19]), AMPLIFIED PROBE TECHNIQUE, MAKING USE OF HIGH THROUGHPUT TECHNOLOGIES AS DESCRIBED BY CMS-2020-01-R: HCPCS | Performed by: OPHTHALMOLOGY

## 2021-06-28 PROCEDURE — 99214 OFFICE O/P EST MOD 30 MIN: CPT | Performed by: INTERNAL MEDICINE

## 2021-06-28 PROCEDURE — U0005 INFEC AGEN DETEC AMPLI PROBE: HCPCS | Performed by: OPHTHALMOLOGY

## 2021-06-28 NOTE — PATIENT INSTRUCTIONS
Avoid aspirin 7 days before the surgery. Avoid nonsteroidal anti-inflammatory pain medication like ibuprofen, Motrin, or Aleve 7 days before the surgery.  Tylenol is okay to use for pain.  Avoid any OTC multivitamins or herbal supplement 7 days before surgery   Resume after surgery  Hold losartan on morning of surgery and resume after that

## 2021-06-28 NOTE — H&P (VIEW-ONLY)
[address], [address]  [facility] [address]  Phone: [phone]  Primary Provider: Merary Munson  Pre-op Performing Provider: MERARY MUNSON      PREOPERATIVE EVALUATION:  Today's date: 6/28/2021    Angela Story is a 71 year old female who presents for a preoperative evaluation.    Surgical Information:  Surgery/Procedure: Bilateral upper eyelid blepharoplasty wtih ptosis repair  Surgery Location: [facility] OR  Surgeon: Dr. Harris  Surgery Date: 06/30/2021  Time of Surgery: 8:05 a.m  Where patient plans to recover: At home with family  Fax number for surgical facility: Note does not need to be faxed, will be available electronically in Epic.    Type of Anesthesia Anticipated: Combined MAC with Local    Assessment & Plan     The proposed surgical procedure is considered LOW risk.    Angela was seen today for pre-op exam.    Diagnoses and all orders for this visit:    Pre-operative examination  -     SARS-CoV-2 COVID-19 Virus (Coronavirus) by PCR  She was a scheduled for Covid test at [place]  But she was here for the appointment so we took care of it and canceled the one at [place] for patient's convenience  Patient is going to have surgery for her ptosis of both eyelids as it is affecting her vision    Ptosis of eyelid, bilateral  She is going to have bilateral blepharoplasty of upper eyelid with ptosis repair    Generalized anxiety disorder  Stable with current medication    Hyperlipidemia LDL goal <100  She is on pravastatin    Hypothyroidism, unspecified type  She takes levothyroxine  Lab Results   Component Value Date    TSH 1.92 12/01/2020       Benign hypertension  Well-controlled and she takes losartan  Told her to hold losartan on morning of surgery    Encounter for screening for other viral diseases  -     Asymptomatic COVID-19 Virus (Coronavirus) by PCR    Other orders  -     REVIEW OF HEALTH MAINTENANCE PROTOCOL ORDERS               Risks and  Recommendations:  The patient has the following additional risks and recommendations for perioperative complications:   - No identified additional risk factors other than previously addressed    Medication Instructions:  Patient is to take all scheduled medications on the day of surgery EXCEPT for modifications listed below:   Avoid aspirin 7 days before the surgery. Avoid nonsteroidal anti-inflammatory pain medication like ibuprofen, Motrin, or Aleve 7 days before the surgery.  Tylenol is okay to use for pain.  Avoid any OTC multivitamins or herbal supplement 7 days before surgery   Resume after surgery      RECOMMENDATION:  APPROVAL GIVEN to proceed with proposed procedure, without further diagnostic evaluation.            Subjective     HPI related to upcoming procedure:   Dropping of eyelid affecting the vision  Is going to have blepharoplasty with ptosis repair of both upper eyelids    Preop Questions 6/23/2021   1. Have you ever had a heart attack or stroke? No   2. Have you ever had surgery on your heart or blood vessels, such as a stent placement, a coronary artery bypass, or surgery on an artery in your head, neck, heart, or legs? No   3. Do you have chest pain with activity? No   4. Do you have a history of  heart failure? No   5. Do you currently have a cold, bronchitis or symptoms of other infection? No   6. Do you have a cough, shortness of breath, or wheezing? No   7. Do you or anyone in your family have previous history of blood clots? No   8. Do you or does anyone in your family have a serious bleeding problem such as prolonged bleeding following surgeries or cuts? No   9. Have you ever had problems with anemia or been told to take iron pills? No   10. Have you had any abnormal blood loss such as black, tarry or bloody stools, or abnormal vaginal bleeding? No   11. Have you ever had a blood transfusion? No   12. Are you willing to have a blood transfusion if it is medically needed before, during, or  after your surgery? Yes   13. Have you or any of your relatives ever had problems with anesthesia? YES - sister had hives with anesthesia      14. Do you have sleep apnea, excessive snoring or daytime drowsiness? No   15. Do you have any artifical heart valves or other implanted medical devices like a pacemaker, defibrillator, or continuous glucose monitor? No   16. Do you have artificial joints? No   17. Are you allergic to latex? No       Health Care Directive:  Patient has a Health Care Directive on file      Preoperative Review of :   reviewed - no record of controlled substances prescribed.      Status of Chronic Conditions:  See problem list for active medical problems.  Problems all longstanding and stable, except as noted/documented.  See ROS for pertinent symptoms related to these conditions.      Review of Systems  Constitutional, neuro, ENT, endocrine, pulmonary, cardiac, gastrointestinal, genitourinary, musculoskeletal, integument and psychiatric systems are negative, except as otherwise noted.    Patient Active Problem List    Diagnosis Date Noted     Dermatochalasis of both upper eyelids 05/17/2021     Priority: Medium     Added automatically from request for surgery [id number]       Ptosis of both upper eyelids 05/17/2021     Priority: Medium     Added automatically from request for surgery [id number]       Hypothyroidism due to Hashimoto's thyroiditis 07/17/2020     Priority: Medium     Hypothyroidism, unspecified type 06/25/2019     Priority: Medium     Anti-TPO antibodies present 04/23/2019     Priority: Medium     Elevated TSH 01/23/2019     Priority: Medium     Non morbid obesity, unspecified obesity type 01/03/2017     Priority: Medium     IFG (impaired fasting glucose) 09/24/2016     Priority: Medium     Thyroid nodule 12/31/2015     Priority: Medium     Generalized anxiety disorder 05/19/2014     Priority: Medium     Bilateral bunions 05/12/2014     Priority: Medium     Right shoulder pain  2014     Priority: Medium     Osteoarthritis of acromioclavicular joint 2014     Priority: Medium     Esophageal reflux 2014     Priority: Medium     Hypertension goal BP (blood pressure) < 140/90 2013     Priority: Medium     Advanced directives, counseling/discussion 2011     Priority: Medium     Advance Care Planning:   Receipt of ACP document:  Received: Health Care Directive which was witnessed or notarized on 2013.  Document not previously scanned.  Validation form completed and sent with document to be scanned.  .  Confirmed/documented designated decision maker(s). See permanent comments section of demographics in clinical tab. View document(s) and details by clicking on code status.   Added by Ernestine Estevez on 2013.             CKD (chronic kidney disease) stage 3, GFR 30-59 ml/min      Priority: Medium     HYPERLIPIDEMIA LDL GOAL <100 2010     Priority: Medium      Past Medical History:   Diagnosis Date     Bilateral bunions 2014     CKD (chronic kidney disease) stage 3, GFR 30-59 ml/min      Gastro-oesophageal reflux disease      Hyperlipidemia LDL goal <100 2010     Hypertension goal BP (blood pressure) < 140/90 3/7/2013     IFG (impaired fasting glucose) 2016     PONV (postoperative nausea and vomiting)      Thyroid nodule 2015     Past Surgical History:   Procedure Laterality Date     BIOPSY      Breast lump     BREAST SURGERY      See above     BUNIONECTOMY  2014    Procedure: BUNIONECTOMY;  Surgeon: Kevin Rodas DPM;  Location: [facility] OR     C  DELIVERY ONLY      , Low Cervical     COLONOSCOPY  ;      EXCISE MASS BACK  2014    Procedure: EXCISE MASS BACK;  Surgeon: Chaim Lacy MD;  Location: [facility] OR     HC ARTHROTOMY W/OPEN MENISCUS REPAIR      lt knee     HC DILATION/CURETTAGE DIAG/THER NON OB  1982    D & C     TONSILLECTOMY      tonsillectomy     ZZC NONSPECIFIC PROCEDURE  1986     lumpectomy, lt     [facility] NONSPECIFIC PROCEDURE      ob      [facility] NONSPECIFIC PROCEDURE  1983    Vaginal delivery x 1     Current Outpatient Medications   Medication Sig Dispense Refill     Blood Pressure Monitoring KIT 1 kit daily 1 kit 0     citalopram (CELEXA) 10 MG tablet Take 1 tablet (10 mg) by mouth daily 90 tablet 1     levothyroxine (SYNTHROID/LEVOTHROID) 25 MCG tablet 37.5 mg daily 90 tablet 3     losartan (COZAAR) 50 MG tablet Take 1 tablet (50 mg) by mouth daily for Blood Pressure 90 tablet 3     nystatin (MYCOSTATIN) 447222 UNIT/GM external powder Apply topically 2 times daily 60 g 1     omeprazole (PRILOSEC) 20 MG DR capsule Take 1 capsule (20 mg) by mouth daily as needed 90 capsule 3     pravastatin (PRAVACHOL) 20 MG tablet Take 1 tablet (20 mg) by mouth daily for cholestrol 90 tablet 3     scopolamine (TRANSDERM) 72 hr patch Apply 1 patch to hairless area behind one ear at least 4 hours before travel.  Remove old patch and change every 3 days (72 hours). (Patient taking differently: Place 1 patch onto the skin as needed Apply 1 patch to hairless area behind one ear at least 4 hours before travel.  Remove old patch and change every 3 days (72 hours).) 4 patch 11     Selenium 200 MCG TABS tablet Take 200 mcg by mouth daily       vitamin D3 (CHOLECALCIFEROL) 50 mcg (2000 units) tablet Take 1 tablet (50 mcg) by mouth daily         Allergies   Allergen Reactions     Norco [Hydrocodone-Acetaminophen] Nausea and Vomiting     Dizzy     Eggs Nausea and Vomiting and Diarrhea     Lisinopril Cough     Mucinex Hives     Penicillins Rash     Sulfa Drugs Hives     Sulfites Nausea and Vomiting and Diarrhea        Social History     Tobacco Use     Smoking status: Never Smoker     Smokeless tobacco: Never Used   Substance Use Topics     Alcohol use: Yes     Comment: 1 per week       History   Drug Use Unknown         Objective     /84 (BP Location: Right arm, Cuff Size: Adult Regular)   Pulse 63   Temp 97.9  F  (36.6  C) (Tympanic)   Wt 77.1 kg (170 lb)   LMP 07/23/2004   SpO2 98%   BMI 32.12 kg/m      Physical Exam    GENERAL APPEARANCE: healthy, alert and no distress     EYES: EOMI, PERRL he has ptosis of both eyelid it is covering part of her cornea     HENT: ear canals and TM's normal and nose and mouth without ulcers or lesions     NECK: no adenopathy, no asymmetry, masses, or scars and thyroid normal to palpation     RESP: lungs clear to auscultation - no rales, rhonchi or wheezes     CV: regular rates and rhythm, normal S1 S2, no S3 or S4 and no murmur, click or rub     ABDOMEN:  soft, nontender, no HSM or masses and bowel sounds normal     MS: extremities normal- no gross deformities noted, no evidence of inflammation in joints, FROM in all extremities.     SKIN: no suspicious lesions or rashes     NEURO: Normal strength and tone, sensory exam grossly normal, mentation intact and speech normal     PSYCH: mentation appears normal. and affect normal/bright     LYMPHATICS: No cervical adenopathy    Recent Labs   Lab Test 02/25/21  1037 01/18/21  0921 06/23/20  0808   HGB  --  14.5 14.2   PLT  --  179 168    141 140   POTASSIUM 4.8 4.6 4.8   CR 1.00 1.10* 0.96   A1C  --  5.8* 5.6        Diagnostics:  No labs were ordered during this visit.   No EKG required for low risk surgery (cataract, skin procedure, breast biopsy, etc).    Revised Cardiac Risk Index (RCRI):  The patient has the following serious cardiovascular risks for perioperative complications:   - No serious cardiac risks = 0 points     RCRI Interpretation: 0 points: Class I (very low risk - 0.4% complication rate)    Disclaimer: This note consists of symbols derived from keyboarding, dictation and/or voice recognition software. As a result, there may be errors in the script that have gone undetected. Please consider this when interpreting information found in this chart.         Signed Electronically by: Merary Geller MD  Copy of this  evaluation report is provided to requesting physician.

## 2021-06-28 NOTE — PROGRESS NOTES
[address], [address]  [facility] [address]  Phone: [phone]  Primary Provider: Merary Munson  Pre-op Performing Provider: MERARY MUNSON      PREOPERATIVE EVALUATION:  Today's date: 6/28/2021    Angela Story is a 71 year old female who presents for a preoperative evaluation.    Surgical Information:  Surgery/Procedure: Bilateral upper eyelid blepharoplasty wtih ptosis repair  Surgery Location: [facility]  Surgeon: Dr. Harris  Surgery Date: 06/30/2021  Time of Surgery: 8:05 a.m  Where patient plans to recover: At home with family  Fax number for surgical facility: Note does not need to be faxed, will be available electronically in Epic.    Type of Anesthesia Anticipated: Combined MAC with Local    Assessment & Plan     The proposed surgical procedure is considered LOW risk.    Angela was seen today for pre-op exam.    Diagnoses and all orders for this visit:    Pre-operative examination  -     SARS-CoV-2 COVID-19 Virus (Coronavirus) by PCR  She was a scheduled for Covid test at [place]  But she was here for the appointment so we took care of it and canceled the one at [place] for patient's convenience  Patient is going to have surgery for her ptosis of both eyelids as it is affecting her vision    Ptosis of eyelid, bilateral  She is going to have bilateral blepharoplasty of upper eyelid with ptosis repair    Generalized anxiety disorder  Stable with current medication    Hyperlipidemia LDL goal <100  She is on pravastatin    Hypothyroidism, unspecified type  She takes levothyroxine  Lab Results   Component Value Date    TSH 1.92 12/01/2020       Benign hypertension  Well-controlled and she takes losartan  Told her to hold losartan on morning of surgery    Encounter for screening for other viral diseases  -     Asymptomatic COVID-19 Virus (Coronavirus) by PCR    Other orders  -     REVIEW OF HEALTH MAINTENANCE PROTOCOL ORDERS               Risks and  Recommendations:  The patient has the following additional risks and recommendations for perioperative complications:   - No identified additional risk factors other than previously addressed    Medication Instructions:  Patient is to take all scheduled medications on the day of surgery EXCEPT for modifications listed below:   Avoid aspirin 7 days before the surgery. Avoid nonsteroidal anti-inflammatory pain medication like ibuprofen, Motrin, or Aleve 7 days before the surgery.  Tylenol is okay to use for pain.  Avoid any OTC multivitamins or herbal supplement 7 days before surgery   Resume after surgery      RECOMMENDATION:  APPROVAL GIVEN to proceed with proposed procedure, without further diagnostic evaluation.            Subjective     HPI related to upcoming procedure:   Dropping of eyelid affecting the vision  Is going to have blepharoplasty with ptosis repair of both upper eyelids    Preop Questions 6/23/2021   1. Have you ever had a heart attack or stroke? No   2. Have you ever had surgery on your heart or blood vessels, such as a stent placement, a coronary artery bypass, or surgery on an artery in your head, neck, heart, or legs? No   3. Do you have chest pain with activity? No   4. Do you have a history of  heart failure? No   5. Do you currently have a cold, bronchitis or symptoms of other infection? No   6. Do you have a cough, shortness of breath, or wheezing? No   7. Do you or anyone in your family have previous history of blood clots? No   8. Do you or does anyone in your family have a serious bleeding problem such as prolonged bleeding following surgeries or cuts? No   9. Have you ever had problems with anemia or been told to take iron pills? No   10. Have you had any abnormal blood loss such as black, tarry or bloody stools, or abnormal vaginal bleeding? No   11. Have you ever had a blood transfusion? No   12. Are you willing to have a blood transfusion if it is medically needed before, during, or  after your surgery? Yes   13. Have you or any of your relatives ever had problems with anesthesia? YES - sister had hives with anesthesia      14. Do you have sleep apnea, excessive snoring or daytime drowsiness? No   15. Do you have any artifical heart valves or other implanted medical devices like a pacemaker, defibrillator, or continuous glucose monitor? No   16. Do you have artificial joints? No   17. Are you allergic to latex? No       Health Care Directive:  Patient has a Health Care Directive on file      Preoperative Review of :   reviewed - no record of controlled substances prescribed.      Status of Chronic Conditions:  See problem list for active medical problems.  Problems all longstanding and stable, except as noted/documented.  See ROS for pertinent symptoms related to these conditions.      Review of Systems  Constitutional, neuro, ENT, endocrine, pulmonary, cardiac, gastrointestinal, genitourinary, musculoskeletal, integument and psychiatric systems are negative, except as otherwise noted.    Patient Active Problem List    Diagnosis Date Noted     Dermatochalasis of both upper eyelids 05/17/2021     Priority: Medium     Added automatically from request for surgery [id number]       Ptosis of both upper eyelids 05/17/2021     Priority: Medium     Added automatically from request for surgery [id number]       Hypothyroidism due to Hashimoto's thyroiditis 07/17/2020     Priority: Medium     Hypothyroidism, unspecified type 06/25/2019     Priority: Medium     Anti-TPO antibodies present 04/23/2019     Priority: Medium     Elevated TSH 01/23/2019     Priority: Medium     Non morbid obesity, unspecified obesity type 01/03/2017     Priority: Medium     IFG (impaired fasting glucose) 09/24/2016     Priority: Medium     Thyroid nodule 12/31/2015     Priority: Medium     Generalized anxiety disorder 05/19/2014     Priority: Medium     Bilateral bunions 05/12/2014     Priority: Medium     Right shoulder pain  2014     Priority: Medium     Osteoarthritis of acromioclavicular joint 2014     Priority: Medium     Esophageal reflux 2014     Priority: Medium     Hypertension goal BP (blood pressure) < 140/90 2013     Priority: Medium     Advanced directives, counseling/discussion 2011     Priority: Medium     Advance Care Planning:   Receipt of ACP document:  Received: Health Care Directive which was witnessed or notarized on 2013.  Document not previously scanned.  Validation form completed and sent with document to be scanned.  .  Confirmed/documented designated decision maker(s). See permanent comments section of demographics in clinical tab. View document(s) and details by clicking on code status.   Added by Ernestine Estevez on 2013.             CKD (chronic kidney disease) stage 3, GFR 30-59 ml/min      Priority: Medium     HYPERLIPIDEMIA LDL GOAL <100 2010     Priority: Medium      Past Medical History:   Diagnosis Date     Bilateral bunions 2014     CKD (chronic kidney disease) stage 3, GFR 30-59 ml/min      Gastro-oesophageal reflux disease      Hyperlipidemia LDL goal <100 2010     Hypertension goal BP (blood pressure) < 140/90 3/7/2013     IFG (impaired fasting glucose) 2016     PONV (postoperative nausea and vomiting)      Thyroid nodule 2015     Past Surgical History:   Procedure Laterality Date     BIOPSY      Breast lump     BREAST SURGERY      See above     BUNIONECTOMY  2014    Procedure: BUNIONECTOMY;  Surgeon: Kevin Rodas DPM;  Location: [facility] OR     C  DELIVERY ONLY      , Low Cervical     COLONOSCOPY  ;      EXCISE MASS BACK  2014    Procedure: EXCISE MASS BACK;  Surgeon: Chaim Lacy MD;  Location: [facility] OR     HC ARTHROTOMY W/OPEN MENISCUS REPAIR      lt knee     HC DILATION/CURETTAGE DIAG/THER NON OB  1982    D & C     TONSILLECTOMY      tonsillectomy     ZZC NONSPECIFIC PROCEDURE  1986     lumpectomy, lt     [facility] NONSPECIFIC PROCEDURE      ob      [facility] NONSPECIFIC PROCEDURE  1983    Vaginal delivery x 1     Current Outpatient Medications   Medication Sig Dispense Refill     Blood Pressure Monitoring KIT 1 kit daily 1 kit 0     citalopram (CELEXA) 10 MG tablet Take 1 tablet (10 mg) by mouth daily 90 tablet 1     levothyroxine (SYNTHROID/LEVOTHROID) 25 MCG tablet 37.5 mg daily 90 tablet 3     losartan (COZAAR) 50 MG tablet Take 1 tablet (50 mg) by mouth daily for Blood Pressure 90 tablet 3     nystatin (MYCOSTATIN) 067686 UNIT/GM external powder Apply topically 2 times daily 60 g 1     omeprazole (PRILOSEC) 20 MG DR capsule Take 1 capsule (20 mg) by mouth daily as needed 90 capsule 3     pravastatin (PRAVACHOL) 20 MG tablet Take 1 tablet (20 mg) by mouth daily for cholestrol 90 tablet 3     scopolamine (TRANSDERM) 72 hr patch Apply 1 patch to hairless area behind one ear at least 4 hours before travel.  Remove old patch and change every 3 days (72 hours). (Patient taking differently: Place 1 patch onto the skin as needed Apply 1 patch to hairless area behind one ear at least 4 hours before travel.  Remove old patch and change every 3 days (72 hours).) 4 patch 11     Selenium 200 MCG TABS tablet Take 200 mcg by mouth daily       vitamin D3 (CHOLECALCIFEROL) 50 mcg (2000 units) tablet Take 1 tablet (50 mcg) by mouth daily         Allergies   Allergen Reactions     Norco [Hydrocodone-Acetaminophen] Nausea and Vomiting     Dizzy     Eggs Nausea and Vomiting and Diarrhea     Lisinopril Cough     Mucinex Hives     Penicillins Rash     Sulfa Drugs Hives     Sulfites Nausea and Vomiting and Diarrhea        Social History     Tobacco Use     Smoking status: Never Smoker     Smokeless tobacco: Never Used   Substance Use Topics     Alcohol use: Yes     Comment: 1 per week       History   Drug Use Unknown         Objective     /84 (BP Location: Right arm, Cuff Size: Adult Regular)   Pulse 63   Temp 97.9  F  (36.6  C) (Tympanic)   Wt 77.1 kg (170 lb)   LMP 07/23/2004   SpO2 98%   BMI 32.12 kg/m      Physical Exam    GENERAL APPEARANCE: healthy, alert and no distress     EYES: EOMI, PERRL he has ptosis of both eyelid it is covering part of her cornea     HENT: ear canals and TM's normal and nose and mouth without ulcers or lesions     NECK: no adenopathy, no asymmetry, masses, or scars and thyroid normal to palpation     RESP: lungs clear to auscultation - no rales, rhonchi or wheezes     CV: regular rates and rhythm, normal S1 S2, no S3 or S4 and no murmur, click or rub     ABDOMEN:  soft, nontender, no HSM or masses and bowel sounds normal     MS: extremities normal- no gross deformities noted, no evidence of inflammation in joints, FROM in all extremities.     SKIN: no suspicious lesions or rashes     NEURO: Normal strength and tone, sensory exam grossly normal, mentation intact and speech normal     PSYCH: mentation appears normal. and affect normal/bright     LYMPHATICS: No cervical adenopathy    Recent Labs   Lab Test 02/25/21  1037 01/18/21  0921 06/23/20  0808   HGB  --  14.5 14.2   PLT  --  179 168    141 140   POTASSIUM 4.8 4.6 4.8   CR 1.00 1.10* 0.96   A1C  --  5.8* 5.6        Diagnostics:  No labs were ordered during this visit.   No EKG required for low risk surgery (cataract, skin procedure, breast biopsy, etc).    Revised Cardiac Risk Index (RCRI):  The patient has the following serious cardiovascular risks for perioperative complications:   - No serious cardiac risks = 0 points     RCRI Interpretation: 0 points: Class I (very low risk - 0.4% complication rate)    Disclaimer: This note consists of symbols derived from keyboarding, dictation and/or voice recognition software. As a result, there may be errors in the script that have gone undetected. Please consider this when interpreting information found in this chart.         Signed Electronically by: Merary Geller MD  Copy of this  evaluation report is provided to requesting physician.

## 2021-06-29 ENCOUNTER — ANESTHESIA EVENT (OUTPATIENT)
Dept: SURGERY | Facility: AMBULATORY SURGERY CENTER | Age: 71
End: 2021-06-29
Payer: COMMERCIAL

## 2021-06-29 ENCOUNTER — TRANSFERRED RECORDS (OUTPATIENT)
Dept: HEALTH INFORMATION MANAGEMENT | Facility: CLINIC | Age: 71
End: 2021-06-29

## 2021-06-29 NOTE — ANESTHESIA PREPROCEDURE EVALUATION
Anesthesia Pre-Procedure Evaluation    Patient: Angela Story   MRN: [id number] : 1950        Preoperative Diagnosis: Dermatochalasis of both upper eyelids [H02.831, H02.834]  Ptosis of both upper eyelids [H02.403]   Procedure : Procedure(s):  Bilateral upper eyelid blepharoplasty wtih ptosis repair     Past Medical History:   Diagnosis Date     Bilateral bunions 2014     CKD (chronic kidney disease) stage 3, GFR 30-59 ml/min      Gastro-oesophageal reflux disease      Hyperlipidemia LDL goal <100 2010     Hypertension goal BP (blood pressure) < 140/90 3/7/2013     IFG (impaired fasting glucose) 2016     PONV (postoperative nausea and vomiting)      Thyroid nodule 2015      Past Surgical History:   Procedure Laterality Date     BIOPSY  1985    Breast lump     BREAST SURGERY      See above     BUNIONECTOMY  2014    Procedure: BUNIONECTOMY;  Surgeon: Kevin Rodas DPM;  Location: [facility] OR     C  DELIVERY ONLY      , Low Cervical     COLONOSCOPY  ;      EXCISE MASS BACK  2014    Procedure: EXCISE MASS BACK;  Surgeon: Chaim Lacy MD;  Location: [facility] OR     HC ARTHROTOMY W/OPEN MENISCUS REPAIR      lt knee     HC DILATION/CURETTAGE DIAG/THER NON OB      D & C     TONSILLECTOMY      tonsillectomy     ZZC NONSPECIFIC PROCEDURE  1986    lumpectomy, lt     ZZC NONSPECIFIC PROCEDURE      ob      ZZC NONSPECIFIC PROCEDURE      Vaginal delivery x 1      Allergies   Allergen Reactions     Norco [Hydrocodone-Acetaminophen] Nausea and Vomiting     Dizzy     Eggs Nausea and Vomiting and Diarrhea     Lisinopril Cough     Mucinex Hives     Penicillins Rash     Sulfa Drugs Hives     Sulfites Nausea and Vomiting and Diarrhea      Social History     Tobacco Use     Smoking status: Never Smoker     Smokeless tobacco: Never Used   Substance Use Topics     Alcohol use: Yes     Comment: 1 per week      Wt Readings from Last 1 Encounters:   21 77.1  kg (170 lb)           Physical Exam    Airway        Mallampati: II   TM distance: > 3 FB   Neck ROM: full   Mouth opening: > 3 cm    Respiratory Devices and Support         Dental       (+) implants      Cardiovascular   cardiovascular exam normal          Pulmonary   pulmonary exam normal                OUTSIDE LABS:  CBC:   Lab Results   Component Value Date    WBC 6.0 01/18/2021    WBC 6.8 06/23/2020    HGB 14.5 01/18/2021    HGB 14.2 06/23/2020    HCT 42.8 01/18/2021    HCT 42.4 06/23/2020     01/18/2021     06/23/2020     BMP:   Lab Results   Component Value Date     02/25/2021     01/18/2021    POTASSIUM 4.8 02/25/2021    POTASSIUM 4.6 01/18/2021    CHLORIDE 105 02/25/2021    CHLORIDE 110 (H) 01/18/2021    CO2 28 02/25/2021    CO2 27 01/18/2021    BUN 17 02/25/2021    BUN 15 01/18/2021    CR 1.00 02/25/2021    CR 1.10 (H) 01/18/2021     (H) 02/25/2021     (H) 01/18/2021     COAGS: No results found for: PTT, INR, FIBR  POC: No results found for: BGM, HCG, HCGS  HEPATIC:   Lab Results   Component Value Date    ALBUMIN 4.3 02/25/2021    PROTTOTAL 7.6 02/25/2021    ALT 24 02/25/2021    AST 20 02/25/2021    ALKPHOS 85 02/25/2021    BILITOTAL 0.5 02/25/2021     OTHER:   Lab Results   Component Value Date    A1C 5.8 (H) 01/18/2021    ANGELICA 10.0 02/25/2021    PHOS 3.5 01/21/2019    TSH 1.92 12/01/2020    T4 1.00 06/23/2020    T3 128 11/29/2019       Anesthesia Plan    ASA Status:  3   NPO Status:  NPO Appropriate    Anesthesia Type: MAC.     - Reason for MAC: straight local not clinically adequate   Induction: Intravenous, Propofol.   Maintenance: Balanced.        Consents    Anesthesia Plan(s) and associated risks, benefits, and realistic alternatives discussed. Questions answered and patient/representative(s) expressed understanding.     - Discussed with:  Patient      - Extended Intubation/Ventilatory Support Discussed: No.      - Patient is DNR/DNI Status: No    Use of blood  products discussed: No .     Postoperative Care    Pain management: IV analgesics, Oral pain medications, Multi-modal analgesia.   PONV prophylaxis: Dexamethasone or Solumedrol, Ondansetron (or other 5HT-3)     Comments:                Sea Frausto MD

## 2021-06-30 ENCOUNTER — HOSPITAL ENCOUNTER (OUTPATIENT)
Facility: AMBULATORY SURGERY CENTER | Age: 71
End: 2021-06-30
Attending: OPHTHALMOLOGY
Payer: COMMERCIAL

## 2021-06-30 ENCOUNTER — ANESTHESIA (OUTPATIENT)
Dept: SURGERY | Facility: AMBULATORY SURGERY CENTER | Age: 71
End: 2021-06-30
Payer: COMMERCIAL

## 2021-06-30 VITALS
HEART RATE: 69 BPM | TEMPERATURE: 96 F | DIASTOLIC BLOOD PRESSURE: 80 MMHG | WEIGHT: 170 LBS | RESPIRATION RATE: 16 BRPM | BODY MASS INDEX: 31.28 KG/M2 | OXYGEN SATURATION: 96 % | SYSTOLIC BLOOD PRESSURE: 154 MMHG | HEIGHT: 62 IN

## 2021-06-30 DIAGNOSIS — H02.834 DERMATOCHALASIS OF BOTH UPPER EYELIDS: ICD-10-CM

## 2021-06-30 DIAGNOSIS — H02.403 PTOSIS OF BOTH UPPER EYELIDS: ICD-10-CM

## 2021-06-30 DIAGNOSIS — H02.831 DERMATOCHALASIS OF BOTH UPPER EYELIDS: ICD-10-CM

## 2021-06-30 PROCEDURE — 15823 BLEPHARP UPR EYELID XCSV SKN: CPT | Mod: 50

## 2021-06-30 RX ORDER — TETRACAINE HYDROCHLORIDE 5 MG/ML
SOLUTION OPHTHALMIC PRN
Status: DISCONTINUED | OUTPATIENT
Start: 2021-06-30 | End: 2021-06-30 | Stop reason: HOSPADM

## 2021-06-30 RX ORDER — NALOXONE HYDROCHLORIDE 0.4 MG/ML
0.2 INJECTION, SOLUTION INTRAMUSCULAR; INTRAVENOUS; SUBCUTANEOUS
Status: DISCONTINUED | OUTPATIENT
Start: 2021-06-30 | End: 2021-07-01 | Stop reason: HOSPADM

## 2021-06-30 RX ORDER — MEPERIDINE HYDROCHLORIDE 25 MG/ML
12.5 INJECTION INTRAMUSCULAR; INTRAVENOUS; SUBCUTANEOUS
Status: DISCONTINUED | OUTPATIENT
Start: 2021-06-30 | End: 2021-07-01 | Stop reason: HOSPADM

## 2021-06-30 RX ORDER — HYDROMORPHONE HYDROCHLORIDE 1 MG/ML
.3-.5 INJECTION, SOLUTION INTRAMUSCULAR; INTRAVENOUS; SUBCUTANEOUS EVERY 10 MIN PRN
Status: DISCONTINUED | OUTPATIENT
Start: 2021-06-30 | End: 2021-07-01 | Stop reason: HOSPADM

## 2021-06-30 RX ORDER — PROPOFOL 10 MG/ML
INJECTION, EMULSION INTRAVENOUS CONTINUOUS PRN
Status: DISCONTINUED | OUTPATIENT
Start: 2021-06-30 | End: 2021-06-30

## 2021-06-30 RX ORDER — NEOMYCIN POLYMYXIN B SULFATES AND DEXAMETHASONE 3.5; 10000; 1 MG/ML; [USP'U]/ML; MG/ML
SUSPENSION/ DROPS OPHTHALMIC
Qty: 5 ML | Refills: 0 | Status: SHIPPED | OUTPATIENT
Start: 2021-06-30 | End: 2021-08-23

## 2021-06-30 RX ORDER — ERYTHROMYCIN 5 MG/G
OINTMENT OPHTHALMIC
Qty: 3.5 G | Refills: 0 | Status: SHIPPED | OUTPATIENT
Start: 2021-06-30 | End: 2021-08-23

## 2021-06-30 RX ORDER — FENTANYL CITRATE 50 UG/ML
25-50 INJECTION, SOLUTION INTRAMUSCULAR; INTRAVENOUS
Status: DISCONTINUED | OUTPATIENT
Start: 2021-06-30 | End: 2021-06-30 | Stop reason: HOSPADM

## 2021-06-30 RX ORDER — DIMENHYDRINATE 50 MG/ML
25 INJECTION, SOLUTION INTRAMUSCULAR; INTRAVENOUS
Status: DISCONTINUED | OUTPATIENT
Start: 2021-06-30 | End: 2021-07-01 | Stop reason: HOSPADM

## 2021-06-30 RX ORDER — SODIUM CHLORIDE, SODIUM LACTATE, POTASSIUM CHLORIDE, CALCIUM CHLORIDE 600; 310; 30; 20 MG/100ML; MG/100ML; MG/100ML; MG/100ML
INJECTION, SOLUTION INTRAVENOUS CONTINUOUS
Status: DISCONTINUED | OUTPATIENT
Start: 2021-06-30 | End: 2021-06-30 | Stop reason: HOSPADM

## 2021-06-30 RX ORDER — LIDOCAINE 40 MG/G
CREAM TOPICAL
Status: DISCONTINUED | OUTPATIENT
Start: 2021-06-30 | End: 2021-06-30 | Stop reason: HOSPADM

## 2021-06-30 RX ORDER — OXYCODONE HYDROCHLORIDE 5 MG/1
5 TABLET ORAL EVERY 6 HOURS PRN
Qty: 6 TABLET | Refills: 0 | Status: SHIPPED | OUTPATIENT
Start: 2021-06-30 | End: 2021-07-03

## 2021-06-30 RX ORDER — SODIUM CHLORIDE, SODIUM LACTATE, POTASSIUM CHLORIDE, CALCIUM CHLORIDE 600; 310; 30; 20 MG/100ML; MG/100ML; MG/100ML; MG/100ML
INJECTION, SOLUTION INTRAVENOUS CONTINUOUS
Status: DISCONTINUED | OUTPATIENT
Start: 2021-06-30 | End: 2021-07-01 | Stop reason: HOSPADM

## 2021-06-30 RX ORDER — ERYTHROMYCIN 5 MG/G
OINTMENT OPHTHALMIC PRN
Status: DISCONTINUED | OUTPATIENT
Start: 2021-06-30 | End: 2021-06-30 | Stop reason: HOSPADM

## 2021-06-30 RX ORDER — ACETAMINOPHEN 325 MG/1
975 TABLET ORAL ONCE
Status: COMPLETED | OUTPATIENT
Start: 2021-06-30 | End: 2021-06-30

## 2021-06-30 RX ORDER — LIDOCAINE HYDROCHLORIDE AND EPINEPHRINE 10; 10 MG/ML; UG/ML
INJECTION, SOLUTION INFILTRATION; PERINEURAL PRN
Status: DISCONTINUED | OUTPATIENT
Start: 2021-06-30 | End: 2021-06-30 | Stop reason: HOSPADM

## 2021-06-30 RX ORDER — ONDANSETRON 2 MG/ML
4 INJECTION INTRAMUSCULAR; INTRAVENOUS EVERY 30 MIN PRN
Status: DISCONTINUED | OUTPATIENT
Start: 2021-06-30 | End: 2021-07-01 | Stop reason: HOSPADM

## 2021-06-30 RX ORDER — NALOXONE HYDROCHLORIDE 0.4 MG/ML
0.4 INJECTION, SOLUTION INTRAMUSCULAR; INTRAVENOUS; SUBCUTANEOUS
Status: DISCONTINUED | OUTPATIENT
Start: 2021-06-30 | End: 2021-07-01 | Stop reason: HOSPADM

## 2021-06-30 RX ORDER — OXYCODONE HYDROCHLORIDE 5 MG/1
5 TABLET ORAL EVERY 4 HOURS PRN
Status: DISCONTINUED | OUTPATIENT
Start: 2021-06-30 | End: 2021-07-01 | Stop reason: HOSPADM

## 2021-06-30 RX ORDER — LORAZEPAM 2 MG/ML
.5-1 INJECTION INTRAMUSCULAR
Status: DISCONTINUED | OUTPATIENT
Start: 2021-06-30 | End: 2021-06-30 | Stop reason: HOSPADM

## 2021-06-30 RX ORDER — PROPOFOL 10 MG/ML
INJECTION, EMULSION INTRAVENOUS PRN
Status: DISCONTINUED | OUTPATIENT
Start: 2021-06-30 | End: 2021-06-30

## 2021-06-30 RX ORDER — ALBUTEROL SULFATE 0.83 MG/ML
2.5 SOLUTION RESPIRATORY (INHALATION) EVERY 4 HOURS PRN
Status: DISCONTINUED | OUTPATIENT
Start: 2021-06-30 | End: 2021-06-30 | Stop reason: HOSPADM

## 2021-06-30 RX ORDER — FENTANYL CITRATE 50 UG/ML
25-50 INJECTION, SOLUTION INTRAMUSCULAR; INTRAVENOUS
Status: DISCONTINUED | OUTPATIENT
Start: 2021-06-30 | End: 2021-07-01 | Stop reason: HOSPADM

## 2021-06-30 RX ORDER — ONDANSETRON 4 MG/1
4 TABLET, ORALLY DISINTEGRATING ORAL EVERY 30 MIN PRN
Status: DISCONTINUED | OUTPATIENT
Start: 2021-06-30 | End: 2021-07-01 | Stop reason: HOSPADM

## 2021-06-30 RX ORDER — FENTANYL CITRATE 50 UG/ML
INJECTION, SOLUTION INTRAMUSCULAR; INTRAVENOUS PRN
Status: DISCONTINUED | OUTPATIENT
Start: 2021-06-30 | End: 2021-06-30

## 2021-06-30 RX ADMIN — PROPOFOL 10 MG: 10 INJECTION, EMULSION INTRAVENOUS at 09:02

## 2021-06-30 RX ADMIN — FENTANYL CITRATE 25 MCG: 50 INJECTION, SOLUTION INTRAMUSCULAR; INTRAVENOUS at 08:50

## 2021-06-30 RX ADMIN — SODIUM CHLORIDE, SODIUM LACTATE, POTASSIUM CHLORIDE, CALCIUM CHLORIDE: 600; 310; 30; 20 INJECTION, SOLUTION INTRAVENOUS at 08:11

## 2021-06-30 RX ADMIN — PROPOFOL 30 MG: 10 INJECTION, EMULSION INTRAVENOUS at 08:38

## 2021-06-30 RX ADMIN — ACETAMINOPHEN 975 MG: 325 TABLET ORAL at 06:59

## 2021-06-30 RX ADMIN — PROPOFOL 100 MCG/KG/MIN: 10 INJECTION, EMULSION INTRAVENOUS at 08:37

## 2021-06-30 RX ADMIN — FENTANYL CITRATE 50 MCG: 50 INJECTION, SOLUTION INTRAMUSCULAR; INTRAVENOUS at 08:34

## 2021-06-30 ASSESSMENT — MIFFLIN-ST. JEOR: SCORE: 1249.36

## 2021-06-30 NOTE — DISCHARGE INSTRUCTIONS
Post-operative Instructions    Ophthalmic Plastic and Reconstructive Surgery  Gregor Harris M.D.  Arash Adamson M.D., M.P.H.      All instructions apply to the operated eye(s) or eyelid(s)      What to expect after surgery:    Thre will be some swelling, bruising, and likely a black eye (even into the lower eyelids and cheeks). Also expect crusting and discharge from the eye and/or incisions.     A small amount of surface bleeding is normal for the first 48 hours after surgery.    You may notice some bloody tears for the first few days after surgery. This is normal.    Your eye(s) and eyelid(s) may be painful and tender. This is normal after surgery. Use the pain medication as prescribed. If your pain does not improve despite the medication, contact the office.    Wound care and personal care:    If a patch or bandage has been placed, please leave this in place until seen in clinic. Prevent the bandage from getting wet.     Apply ice compresses 15 minutes on 15 minutes off while awake for the first 2 days after surgery, then switch to warm compresses 4 times a day until seen by your physician.     For warm packs you can place a cup of dry uncooked rice in a clean cotton sock. Place sock in microwave 30 seconds to one minute. Next place the warm sock into a plastic bag and wrap the bag with clean warm wet washcloth and place over operated eye.      You may shower or wash your hair the day after surgery. Do not bathe or go swimming for 1 week to prevent contamination of your wounds.    Do not apply make-up to the eyes or eyelids for 2 weeks after surgery.      Activity restrictions and driving:    Avoid heavy lifting, bending, exercise or strenuous activity for 1 week after surgery.    You may resume other activities and return to work as tolerated.    You may not resume driving until have you stopped using narcotic pain medications(such as Norco, Percocet, Tylenol #3).    Sinus Precautions for 1 week:  Sneeze with mouth open. Cough with mouth open. No blowing nose. No straws. No bending over.    Medications:    Restart all your regular home medications and eye drops today. If you take Plavix or Aspirin on a regular basis, wait for 3 days after your surgery before restarting these in order to decrease the risk of bleeding complications.    Avoid aspirin and aspirin-like medications (Motrin, Aleve, Ibuprofen, [place] etc) for 5 days to reduce the risk of bleeding. You may take Tylenol (acetaminophen) for pain.    In addition to your home medications, take the following post-operative medications as prescribed by your physician:    Apply antibiotic ointment (erythromycin) to all sutures three times a day, and into the operated eye(s) at night.    Instill eye drops (Maxitrol) four times a day until the bottle finished.     Take scheduled extra strength Tylenol for pain.  You may take 1 to 2 pain pills (norco or oxycodone as prescribed) as needed for breakthrough pain up to every 6 hours.    The pain pills may make you drowsy. You must not drive a car, operate heavy machinery or drink alcohol while taking them.    The pain pills may cause constipation and nausea. Take them with some food to prevent a stomach upset. If you continue to experience nausea, call your physician.      WARNING: All the prescription pain medications listed above contain Tylenol (acetaminophen). You must not take more than 4,000 mg of acetaminophen per 24-hour period. This is equivalent to 6 tablets of Darvocet, 8 tablets of Vicodin, or 12 tablets of Norco, Percocet or Tylenol #3. If you take other over-the-counter medications containing acetaminophen, you must take the amount of acetaminophen into account and reduce the number of prescribed pain pills accordingly.    Contact information and follow-up:    Return to the Eye Clinic for a follow-up appointment with your physician as  scheduled. If no appointment has been scheduled, call  [phone] for an  appointment with Dr. Harris within 1 to 2 weeks from your date of surgery.  -    Please email a few photos of your eye(s) or other operative site(s) to [email or web address] prior to your follow up visit.    For severe pain, bleeding, or loss of vision, call the Eye Clinic at [phone].    An on call person can be reached after hours for concerns. The on call doctor should not call in medication refill requests after hours or on weekends, so please plan accordingly. An effort has been made to provide adequate pain medications following every surgery, and refills will not be provided in most instances. Narcotic pain medications cannot be called in.     [facility]  Home Care Following Anesthesia  For 24 hours after surgery:  1. Get plenty of rest.  A responsible adult must stay with you for at least 24 hours after you leave the surgery center.  2. Do not drive or use heavy equipment.  If you have weakness or tingling, don't drive or use heavy equipment until this feeling goes away.   3. Do not drink alcohol.   4. Avoid strenuous or risky activities.  Ask for help when climbing stairs.  5. You may feel lightheaded.  IF so, sit for a few minutes before standing.  Have someone help you get up.   6. If you have nausea (feel sick to your stomach): Drink only clear liquids such as apple juice, ginger ale, broth or 7-Up.  Rest may also help.  Be sure to drink enough fluids.  Move to a regular diet as you feel able.   7. You may have a slight fever.  Call the doctor if your fever is over 100 F (37.7 C) (taken under the tongue) or lasts longer than 24 hours.  8. You may have a dry mouth, a sore throat, muscle aches or trouble sleeping. These should go away after 24 hours.  9. Do not make important or legal decisions.   10. It is recommended to avoid smoking.          Tips for taking pain medications  To get the best pain relief possible, remember these  points:    Take pain medications as directed, before pain becomes severe.    Pain medication can upset your stomach: taking it with food may help.    Constipation is a common side effect of pain medication. Drink plenty of  fluids.    Eat foods high in fiber. Take a stool softener if recommended by your doctor or pharmacist.    Do not drink alcohol, drive or operate machinery while taking pain medications.    Ask about other ways to control pain, such as with heat, ice or relaxation.    Tylenol/Acetaminophen Consumption  To help encourage the safe use of acetaminophen, the makers of TYLENOL  have lowered the maximum daily dose for single-ingredient Extra Strength TYLENOL  (acetaminophen) products sold in the [place] from 8 pills per day (4,000 mg) to 6 pills per day (3,000 mg). The dosing interval has also changed from 2 pills every 4-6 hours to 2 pills every 6 hours.    If you feel your pain relief is insufficient, you may take Tylenol/Acetaminophen in addition to your narcotic pain medication.     Be careful not to exceed 3,000 mg of Tylenol/Acetaminophen in a 24 hour period from all sources.    If you are taking extra strength Tylenol/acetaminophen (500 mg), the maximum dose is 6 tablets in 24 hours.    If you are taking regular strength acetaminophen (325 mg), the maximum dose is 9 tablets in 24 hours.  975 mg of Tylenol given at 7:00 am next dose may be given after 1:00 pm    Call a doctor for any of the followin. Signs of infection (fever, growing tenderness at the surgery site, a large amount of drainage or bleeding, severe pain, foul-smelling drainage, redness, swelling).  2. It has been over 8 to 10 hours since surgery and you are still not able to urinate (pass water).  3. Headache for over 24 hours.  4. Signs of Covid-19 infection (temperature over 100 degrees, shortness of breath, cough, loss of taste/smell, generalized body aches, persistent headache, chills, sore throat,  nausea/vomiting/diarrhea)  Your doctor is:  Dr. Gregor Harris, Ophthalmology: [phone]             Or dial [phone] and ask for the resident on call for:  Ophthalmology  For emergency care, call the:  [facility]:  [phone] (TTY for hearing impaired: [phone])

## 2021-06-30 NOTE — BRIEF OP NOTE
[facility] Brief Operative Note    Pre-operative diagnosis: Dermatochalasis of both upper eyelids [H02.831, H02.834]  Ptosis of both upper eyelids [H02.403]   Post-operative diagnosis * No post-op diagnosis entered *  Same as above    Procedure: Procedure(s):  Bilateral upper eyelid blepharoplasty wtih ptosis repair   Surgeon(s): Surgeon(s) and Role:     * Gregor Harris MD - Primary     * Arash Adamson MD - Fellow - Assisting      * Hill Cabral MD - Resident - Assisting    Estimated blood loss: * No values recorded between 6/30/2021  8:47 AM and 6/30/2021  9:24 AM *    Specimens: * No specimens in log *   Findings: As per above     Hill Cabral MD  Department of Ophthalmology  Pager: [phone]

## 2021-06-30 NOTE — OP NOTE
PREOPERATIVE DIAGNOSIS:   1. Bilateral upper eyelid dermatochalasis.   2. Bilateral upper eyelid ptosis.   POSTOPERATIVE DIAGNOSES:   1. Bilateral upper eyelid dermatochalasis.   2. Bilateral upper eyelid ptosis.   PROCEDURE PERFORMED:   1. Bilateral upper blepharoplasty.   2. Bilateral upper eyelid ptosis repair by Pillai's muscle conjunctival resection.   SURGEON: Gregor Harris MD   ASSISTANT: Arash Adamson MD and Jasmni Cabral MD  ANESTHESIA: Monitored with local infiltration of 1% lidocaine with epinephrine.   COMPLICATIONS: None.   ESTIMATED BLOOD LOSS: Less than 5 mL.   HISTORY: Angela Story  presented with upper lid drooping interfering with his superior visual field and activities of daily living. After the risks, benefits and alternatives to the proposed procedure were explained, informed consent was obtained.   DESCRIPTION OF PROCEDURE: Angela Story  was brought to the operating room and placed supine on the operating table. IV sedation was given. The upper eyelid crease and excess upper eyelid skin was marked with a marking pen and infiltrated with local anesthetic.  The area was prepped and draped in the typical sterile ophthalmic fashion. Attention was directed to the right side. Skin was incised following marked lines. Skin flap was excised with a high temperature cautery. A row of cautery was placed in the orbicularis along the inferior incision line.   The orbicularis and septum were opened nasally. A small amount of the nasal fat pad was excised with the cautery. Hemostasis was obtained. A 4-0 silk suture was placed through the eyelid margin and the eyelid everted over a Desmarres retractor. . A 6-0 silk suture was then threaded through the conjunctiva and Pillai's muscle 4.0mm from the superior tarsal border. These sutures were used to elevate the conjunctiva and Pillai's muscle which was gently peeled from the underlying levator muscle.The Putterman clamp was used and clamped over  the elevated tissues. A 6-0 plain gut suture was run in a horizontal mattress fashion 1 mm below the clamp from lateral to medial, then medial to lateral. The elevated tissues were excised with a 15 blade. The sutures were then externalized into the blepharoplasty incision and tied. The 4-0 silk suture was removed. The lid was reverted to its normal position. The skin incision was closed with a running 6-0 plain gut suture. The same procedure was performed on the left side.The patient tolerated the procedures well. Antibiotic ointment was applied. Angela Story left the operating room in stable condition.      CARLYN FOY MD

## 2021-06-30 NOTE — ANESTHESIA POSTPROCEDURE EVALUATION
Patient: Angela Story    Procedure(s):  Bilateral upper eyelid blepharoplasty wtih ptosis repair    Diagnosis:Dermatochalasis of both upper eyelids [H02.831, H02.834]  Ptosis of both upper eyelids [H02.403]  Diagnosis Additional Information: No value filed.    Anesthesia Type:  MAC    Note:  Anesthesia Post Evaluation    Last vitals:  Vitals:    06/30/21 0925 06/30/21 0940 06/30/21 0955   BP: 132/71 (!) 148/77 (!) 141/72   Pulse:      Resp: 16 16 16   Temp: 35.6  C (96  F)     SpO2: 95% 93% 95%       Last vitals prior to Anesthesia Care Transfer:  CRNA VITALS  6/30/2021 0853 - 6/30/2021 0953      6/30/2021             Ht Rate:  72          Electronically Signed By: Sea Frausto MD  June 30, 2021  10:00 AM

## 2021-07-01 ENCOUNTER — TELEPHONE (OUTPATIENT)
Dept: OPHTHALMOLOGY | Facility: CLINIC | Age: 71
End: 2021-07-01

## 2021-07-01 NOTE — TELEPHONE ENCOUNTER
POD1  A telephone call was made to Angela Story to make sure the post operative course has been uneventful and that all questions were answered. There was no answer and a telephone message was left.    Arash Adamson MD

## 2021-07-12 ENCOUNTER — OFFICE VISIT (OUTPATIENT)
Dept: OPHTHALMOLOGY | Facility: CLINIC | Age: 71
End: 2021-07-12
Payer: COMMERCIAL

## 2021-07-12 DIAGNOSIS — H57.89 THYROID EYE DISEASE: ICD-10-CM

## 2021-07-12 DIAGNOSIS — E07.9 THYROID EYE DISEASE: ICD-10-CM

## 2021-07-12 DIAGNOSIS — Z98.890 POSTOPERATIVE EYE STATE: Primary | ICD-10-CM

## 2021-07-12 PROCEDURE — 99024 POSTOP FOLLOW-UP VISIT: CPT | Performed by: OPHTHALMOLOGY

## 2021-07-12 ASSESSMENT — VISUAL ACUITY
OS_CC+: -2
METHOD: SNELLEN - LINEAR
OS_CC: 20/20
OD_CC+: -1
CORRECTION_TYPE: GLASSES
OD_CC: 20/20

## 2021-07-12 ASSESSMENT — EXTERNAL EXAM - LEFT EYE: OS_EXAM: PERIORBITAL EDEMA

## 2021-07-12 ASSESSMENT — TONOMETRY
OD_IOP_MMHG: 14
OS_IOP_MMHG: 11
IOP_METHOD: ICARE

## 2021-07-12 ASSESSMENT — SLIT LAMP EXAM - LIDS
COMMENTS: INCISIONS CLEAN/DRY/INTACT
COMMENTS: INCISIONS CLEAN/DRY/INTACT

## 2021-07-12 ASSESSMENT — EXTERNAL EXAM - RIGHT EYE: OD_EXAM: PERIORBITAL EDEMA

## 2021-07-12 NOTE — PROGRESS NOTES
Chief Complaints and History of Present Illnesses   Patient presents with     Post Op (Ophthalmology) Both Eyes     Chief Complaint(s) and History of Present Illness(es)     Post Op (Ophthalmology) Both Eyes     In both eyes.  This started weeks ago.  Occurring constantly.  Since   onset it is stable.  Associated symptoms include Negative for eye pain.    Treatments tried include warm compresses and ointment.  Pain was noted as   0/10.              Comments     S/p Bilateral upper blepharoplasty and Bilateral upper eyelid ptosis   repair by Pillai's muscle conjunctival resection on 6/30/2021. Pt states   both eyes have felt more discomfort, unsure if it has to do with HAYDEE. Pt   having less bruising but noticed more swelling today. Otherwise healing   well.     China Posey, COT COT 8:28 AM July 12, 2021             Assessment & Plan     Angela Story is a 71 year old female with the following diagnoses:   1. Postoperative eye state    2. Thyroid eye disease - Both Eyes         Continue antibiotic ointment or bland lubricating ointment (eg vaseline or aquaphor) to the incision(s) two times a day.    Gently massage along the incision(s) two times a day.    Use warm soaks over the incision(s) four times a day until swelling and bruises resolve.    Return to clinic 6-8 weeks          Attending Physician Attestation:  Complete documentation of historical and exam elements from today's encounter can be found in the full encounter summary report (not reduplicated in this progress note).  I personally obtained the chief complaint(s) and history of present illness.  I confirmed and edited as necessary the review of systems, past medical/surgical history, family history, social history, and examination findings as documented by others; and I examined the patient myself.  I personally reviewed the relevant tests, images, and reports as documented above.  I formulated and edited as necessary the assessment and plan and  discussed the findings and management plan with the patient and family.   -Gregor Harris MD  9:00 AM 7/12/2021

## 2021-07-12 NOTE — NURSING NOTE
Chief Complaints and History of Present Illnesses   Patient presents with     Post Op (Ophthalmology) Both Eyes     Chief Complaint(s) and History of Present Illness(es)     Post Op (Ophthalmology) Both Eyes     Laterality: both eyes    Onset: weeks ago    Frequency: constantly    Course: stable    Associated symptoms: Negative for eye pain    Treatments tried: warm compresses and ointment    Pain scale: 0/10              Comments     S/p Bilateral upper blepharoplasty and Bilateral upper eyelid ptosis repair by Pillai's muscle conjunctival resection on 6/30/2021. Pt states both eyes have felt more discomfort, unsure if it has to do with HAYDEE. Pt having less bruising but noticed more swelling today. Otherwise healing well.     China Posey, REAL COT 8:28 AM July 12, 2021

## 2021-07-18 ASSESSMENT — ENCOUNTER SYMPTOMS
PARESTHESIAS: 0
DIZZINESS: 0
WEAKNESS: 0
FEVER: 0
SHORTNESS OF BREATH: 0
NERVOUS/ANXIOUS: 0
JOINT SWELLING: 0
HEADACHES: 0
HEMATOCHEZIA: 0
COUGH: 0
CONSTIPATION: 0
HEMATURIA: 0
ARTHRALGIAS: 1
SORE THROAT: 0
EYE PAIN: 0
MYALGIAS: 0
CHILLS: 0
HEARTBURN: 0
FREQUENCY: 0
BREAST MASS: 0
DIARRHEA: 0
NAUSEA: 0
DYSURIA: 0
ABDOMINAL PAIN: 0
PALPITATIONS: 0

## 2021-07-18 ASSESSMENT — ACTIVITIES OF DAILY LIVING (ADL): CURRENT_FUNCTION: NO ASSISTANCE NEEDED

## 2021-07-19 ENCOUNTER — OFFICE VISIT (OUTPATIENT)
Dept: FAMILY MEDICINE | Facility: CLINIC | Age: 71
End: 2021-07-19
Payer: COMMERCIAL

## 2021-07-19 VITALS
OXYGEN SATURATION: 97 % | BODY MASS INDEX: 31.91 KG/M2 | WEIGHT: 169 LBS | HEIGHT: 61 IN | DIASTOLIC BLOOD PRESSURE: 81 MMHG | TEMPERATURE: 97.5 F | SYSTOLIC BLOOD PRESSURE: 133 MMHG | HEART RATE: 66 BPM

## 2021-07-19 DIAGNOSIS — E06.3 HYPOTHYROIDISM DUE TO HASHIMOTO'S THYROIDITIS: ICD-10-CM

## 2021-07-19 DIAGNOSIS — E78.5 HYPERLIPIDEMIA LDL GOAL <100: ICD-10-CM

## 2021-07-19 DIAGNOSIS — Z00.00 ENCOUNTER FOR MEDICARE ANNUAL WELLNESS EXAM: Primary | ICD-10-CM

## 2021-07-19 DIAGNOSIS — F41.1 GENERALIZED ANXIETY DISORDER: ICD-10-CM

## 2021-07-19 DIAGNOSIS — R76.8 ANTI-TPO ANTIBODIES PRESENT: ICD-10-CM

## 2021-07-19 DIAGNOSIS — I10 BENIGN HYPERTENSION: ICD-10-CM

## 2021-07-19 DIAGNOSIS — N18.31 STAGE 3A CHRONIC KIDNEY DISEASE (H): ICD-10-CM

## 2021-07-19 LAB
ALBUMIN SERPL-MCNC: 4.1 G/DL (ref 3.4–5)
ALP SERPL-CCNC: 74 U/L (ref 40–150)
ALT SERPL W P-5'-P-CCNC: 24 U/L (ref 0–50)
ANION GAP SERPL CALCULATED.3IONS-SCNC: 3 MMOL/L (ref 3–14)
AST SERPL W P-5'-P-CCNC: 18 U/L (ref 0–45)
BILIRUB SERPL-MCNC: 0.7 MG/DL (ref 0.2–1.3)
BUN SERPL-MCNC: 14 MG/DL (ref 7–30)
CALCIUM SERPL-MCNC: 9.4 MG/DL (ref 8.5–10.1)
CHLORIDE BLD-SCNC: 108 MMOL/L (ref 94–109)
CHOLEST SERPL-MCNC: 191 MG/DL
CO2 SERPL-SCNC: 29 MMOL/L (ref 20–32)
CREAT SERPL-MCNC: 1.13 MG/DL (ref 0.52–1.04)
CREAT UR-MCNC: 141 MG/DL
FASTING STATUS PATIENT QL REPORTED: YES
GFR SERPL CREATININE-BSD FRML MDRD: 50 ML/MIN/1.73M2
GLUCOSE BLD-MCNC: 122 MG/DL (ref 70–99)
HDLC SERPL-MCNC: 72 MG/DL
LDLC SERPL CALC-MCNC: 98 MG/DL
MICROALBUMIN UR-MCNC: 5 MG/DL
MICROALBUMIN/CREAT UR: 3.55 MG/G CR (ref 0–25)
NONHDLC SERPL-MCNC: 119 MG/DL
POTASSIUM BLD-SCNC: 4.5 MMOL/L (ref 3.4–5.3)
PROT SERPL-MCNC: 7.1 G/DL (ref 6.8–8.8)
SODIUM SERPL-SCNC: 140 MMOL/L (ref 133–144)
TRIGL SERPL-MCNC: 107 MG/DL

## 2021-07-19 PROCEDURE — 80061 LIPID PANEL: CPT | Performed by: INTERNAL MEDICINE

## 2021-07-19 PROCEDURE — 82043 UR ALBUMIN QUANTITATIVE: CPT | Performed by: INTERNAL MEDICINE

## 2021-07-19 PROCEDURE — 36415 COLL VENOUS BLD VENIPUNCTURE: CPT | Performed by: INTERNAL MEDICINE

## 2021-07-19 PROCEDURE — 99213 OFFICE O/P EST LOW 20 MIN: CPT | Mod: 25 | Performed by: INTERNAL MEDICINE

## 2021-07-19 PROCEDURE — G0438 PPPS, INITIAL VISIT: HCPCS | Performed by: INTERNAL MEDICINE

## 2021-07-19 PROCEDURE — 80053 COMPREHEN METABOLIC PANEL: CPT | Performed by: INTERNAL MEDICINE

## 2021-07-19 RX ORDER — PRAVASTATIN SODIUM 20 MG
20 TABLET ORAL DAILY
Qty: 90 TABLET | Refills: 3 | Status: SHIPPED | OUTPATIENT
Start: 2021-07-19 | End: 2022-08-29

## 2021-07-19 RX ORDER — LOSARTAN POTASSIUM 50 MG/1
50 TABLET ORAL DAILY
Qty: 90 TABLET | Refills: 3 | Status: SHIPPED | OUTPATIENT
Start: 2021-07-19 | End: 2022-02-28

## 2021-07-19 RX ORDER — LEVOTHYROXINE SODIUM 25 UG/1
TABLET ORAL
Qty: 90 TABLET | Refills: 3 | COMMUNITY
Start: 2021-07-19 | End: 2022-08-29

## 2021-07-19 RX ORDER — CITALOPRAM HYDROBROMIDE 10 MG/1
10 TABLET ORAL DAILY
Qty: 90 TABLET | Refills: 1 | Status: SHIPPED | OUTPATIENT
Start: 2021-07-19 | End: 2022-05-09

## 2021-07-19 ASSESSMENT — MIFFLIN-ST. JEOR: SCORE: 1228.96

## 2021-07-19 ASSESSMENT — ACTIVITIES OF DAILY LIVING (ADL): CURRENT_FUNCTION: NO ASSISTANCE NEEDED

## 2021-07-19 NOTE — PATIENT INSTRUCTIONS
You will be due for mammogram after 8/3/21  Please call the following number to make appointment :  [phone]  It is located in [address]  Labs today  Monitor your blood pressure once a week  at home.  Bring those readings on your next visit.  Notify us if your blood pressure readings consistently stays greater than 140/90.    Follow up in one year for physical   Seek sooner medical attention if there is any worsening of symptoms or problems.      Patient Education   Personalized Prevention Plan  You are due for the preventive services outlined below.  Your care team is available to assist you in scheduling these services.  If you have already completed any of these items, please share that information with your care team to update in your medical record.  Health Maintenance Due   Topic Date Due     Kidney Microalbumin Urine Test  04/22/2020     Mammogram  08/03/2021     Preventive Health Recommendations    See your health care provider every year to    Review health changes.     Discuss preventive care.      Review your medicines if your doctor has prescribed any.    You no longer need a yearly Pap test unless you've had an abnormal Pap test in the past 10 years. If you have vaginal symptoms, such as bleeding or discharge, be sure to talk with your provider about a Pap test.    Every 1 to 2 years, have a mammogram.  If you are over 69, talk with your health care provider about whether or not you want to continue having screening mammograms.    Every 10 years, have a colonoscopy. Or, have a yearly FIT test (stool test). These exams will check for colon cancer.     Have a cholesterol test every 5 years, or more often if your doctor advises it.     Have a diabetes test (fasting glucose) every three years. If you are at risk for diabetes, you should have this test more often.     At age 65, have a bone density scan (DEXA) to check for osteoporosis (brittle bone disease).    Shots:    Get a flu shot each year.    Get  a tetanus shot every 10 years.    Talk to your doctor about your pneumonia vaccines. There are now two you should receive - Pneumovax (PPSV 23) and Prevnar (PCV 13).    Talk to your pharmacist about the shingles vaccine.    Talk to your doctor about the hepatitis B vaccine.    Nutrition:     Eat at least 5 servings of fruits and vegetables each day.    Eat whole-grain bread, whole-wheat pasta and brown rice instead of white grains and rice.    Get adequate Calcium and Vitamin D.     Lifestyle    Exercise at least 150 minutes a week (30 minutes a day, 5 days a week). This will help you control your weight and prevent disease.    Limit alcohol to one drink per day.    No smoking.     Wear sunscreen to prevent skin cancer.     See your dentist twice a year for an exam and cleaning.    See your eye doctor every 1 to 2 years to screen for conditions such as glaucoma, macular degeneration and cataracts.    Personalized Prevention Plan  You are due for the preventive services outlined below.  Your care team is available to assist you in scheduling these services.  If you have already completed any of these items, please share that information with your care team to update in your medical record.  Health Maintenance   Topic Date Due     MICROALBUMIN  04/22/2020     MAMMO SCREENING  08/03/2021     INFLUENZA VACCINE (1) 09/01/2021     TSH W/FREE T4 REFLEX  12/01/2021     LIPID  01/18/2022     BMP  02/25/2022     ANNUAL REVIEW OF HM ORDERS  06/28/2022     FALL RISK ASSESSMENT  07/12/2022     MEDICARE ANNUAL WELLNESS VISIT  07/19/2022     COLORECTAL CANCER SCREENING  03/27/2023     ADVANCE CARE PLANNING  07/19/2026     DTAP/TDAP/TD IMMUNIZATION (3 - Td or Tdap) 07/24/2027     DEXA  03/06/2030     HEPATITIS C SCREENING  Completed     PHQ-2  Completed     Pneumococcal Vaccine: 65+ Years  Completed     ZOSTER IMMUNIZATION  Completed     COVID-19 Vaccine  Completed     IPV IMMUNIZATION  Aged Out     MENINGITIS IMMUNIZATION  Aged  Out     HEPATITIS B IMMUNIZATION  Aged Out

## 2021-07-19 NOTE — PROGRESS NOTES
"SUBJECTIVE:   Angela Story is a 71 year old female who presents for Preventive Visit.      Patient has been advised of split billing requirements and indicates understanding: Yes   Are you in the first 12 months of your Medicare coverage?  No    Healthy Habits:     In general, how would you rate your overall health?  Good    Frequency of exercise:  4-5 days/week    Duration of exercise:  45-60 minutes    Do you usually eat at least 4 servings of fruit and vegetables a day, include whole grains    & fiber and avoid regularly eating high fat or \"junk\" foods?  Yes    Medication side effects:  Not applicable    Ability to successfully perform activities of daily living:  No assistance needed    Home Safety:  No safety concerns identified    Hearing Impairment:  No hearing concerns    In the past 6 months, have you been bothered by leaking of urine?  No    In general, how would you rate your overall mental or emotional health?  Excellent      PHQ-2 Total Score: 0    Additional concerns today:  No    Do you feel safe in your environment? Yes    Have you ever done Advance Care Planning? (For example, a Health Directive, POLST, or a discussion with a medical provider or your loved ones about your wishes): Yes, advance care planning is on file.       Fall risk  Fallen 2 or more times in the past year?: No  Any fall with injury in the past year?: No    Cognitive Screening   1) Repeat 3 items (Leader, Season, Table)    2) Clock draw: NORMAL  3) 3 item recall: Recalls 3 objects  Results: 3 items recalled: COGNITIVE IMPAIRMENT LESS LIKELY    Mini-CogTM Copyright ROSARIO Guerrero. Licensed by the author for use in [facility]; reprinted with permission (fifi@.[facility]). All rights reserved.      Do you have sleep apnea, excessive snoring or daytime drowsiness?: no    Reviewed and updated as needed this visit by clinical staff  Tobacco  Allergies  Meds  Problems  Med Hx  Surg Hx  Fam Hx          Reviewed and updated as " "needed this visit by Provider                Social History     Tobacco Use     Smoking status: Never Smoker     Smokeless tobacco: Never Used   Substance Use Topics     Alcohol use: Yes     Comment: 1 per week     If you drink alcohol do you typically have >3 drinks per day or >7 drinks per week? No    Alcohol Use 7/19/2021   Prescreen: >3 drinks/day or >7 drinks/week? -   Prescreen: >3 drinks/day or >7 drinks/week? No           Current providers sharing in care for this patient include:   Patient Care Team:  Merary Geller MD as PCP - General (Internal Medicine)  Satish Scott as Other (see comments) (Therapist)  Raphael Aguero MD as MD (Ophthalmology)  Merary Geller MD as Assigned PCP  Raphael Aguero MD as Assigned Surgical Provider    The following health maintenance items are reviewed in Epic and correct as of today:  Health Maintenance Due   Topic Date Due     MICROALBUMIN  04/22/2020     MAMMO SCREENING  08/03/2021     Dietician helped with wt loss  Gluten free diet     Breast CA Risk Assessment (FHS-7) 7/18/2021   Do you have a family history of breast, colon, or ovarian cancer? No / Unknown           Pertinent mammograms are reviewed under the imaging tab.    Review of Systems  Constitutional, HEENT, cardiovascular, pulmonary, GI, , musculoskeletal, neuro, skin, endocrine and psych systems are negative, except as otherwise noted.    OBJECTIVE:   /81   Pulse 66   Temp 97.5  F (36.4  C) (Tympanic)   Ht 1.549 m (5' 1\")   Wt 76.7 kg (169 lb)   LMP 07/23/2004   SpO2 97%   BMI 31.93 kg/m   Estimated body mass index is 31.93 kg/m  as calculated from the following:    Height as of this encounter: 1.549 m (5' 1\").    Weight as of this encounter: 76.7 kg (169 lb).  Physical Exam  GENERAL: healthy, alert and no distress  EYES: Eyes grossly normal to inspection, PERRL and conjunctivae and sclerae normal  HENT: ear canals and TM's normal, nose and mouth without ulcers or " lesions  NECK: no adenopathy, no asymmetry, masses, or scars and thyroid normal to palpation  RESP: lungs clear to auscultation - no rales, rhonchi or wheezes  BREAST: normal without masses, tenderness or nipple discharge and no palpable axillary masses or adenopathy  CV: regular rate and rhythm, normal S1 S2, no S3 , slight peripheral edema and peripheral pulses strong  ABDOMEN: soft, nontender, no hepatosplenomegaly, no masses and bowel sounds normal  MS: no gross musculoskeletal defects noted, no edema  SKIN: no suspicious lesions or rashes  Numerous moles follows dermatologist in [place]  Slight peripheral edema and some prominent spider veins  NEURO: Normal strength and tone, mentation intact and speech normal  PSYCH: mentation appears normal, affect normal/bright        ASSESSMENT / PLAN:   Angela was seen today for physical.    Diagnoses and all orders for this visit:    Encounter for Medicare annual wellness exam  Preventive health counseling was also done.  She will be due for mammogram after August 3, 2021  Up-to-date with her colonoscopy which was done in 2018    Generalized anxiety disorder  -     citalopram (CELEXA) 10 MG tablet; Take 1 tablet (10 mg) by mouth daily  This medication is working for her    Hyperlipidemia LDL goal <100  -     Lipid panel reflex to direct LDL Fasting  -     Comprehensive metabolic panel  -     pravastatin (PRAVACHOL) 20 MG tablet; Take 1 tablet (20 mg) by mouth daily for cholestrol    Benign hypertension  -     losartan (COZAAR) 50 MG tablet; Take 1 tablet (50 mg) by mouth daily for Blood Pressure  Per patient home blood pressure is under control  When I rechecked it was better than first time  Monitor your blood pressure once a week  at home.  Bring those readings on your next visit.  Notify us if your blood pressure readings consistently stays greater than 140/90.      Hypothyroidism due to Hashimoto's thyroiditis  Patient is followed by endocrinologist  She wants me to  "take over prescribing her levothyroxine in future  She had TSH testing done on June 29, 2021  Her TSH was 2.34    Anti-TPO antibodies present    Stage 3a chronic kidney disease  -     Albumin Random Urine Quantitative with Creat Ratio; Future    Patient is followed by dermatology in [place]    Disclaimer: This note consists of symbols derived from keyboarding, dictation and/or voice recognition software. As a result, there may be errors in the script that have gone undetected. Please consider this when interpreting information found in this chart.      Patient has been advised of split billing requirements and indicates understanding: Yes  COUNSELING:  Reviewed preventive health counseling, as reflected in patient instructions       Regular exercise       Healthy diet/nutrition    Estimated body mass index is 31.93 kg/m  as calculated from the following:    Height as of this encounter: 1.549 m (5' 1\").    Weight as of this encounter: 76.7 kg (169 lb).    Weight management plan: Discussed healthy diet and exercise guidelines    She reports that she has never smoked. She has never used smokeless tobacco.      Appropriate preventive services were discussed with this patient, including applicable screening as appropriate for cardiovascular disease, diabetes, osteopenia/osteoporosis, and glaucoma.  As appropriate for age/gender, discussed screening for colorectal cancer, prostate cancer, breast cancer, and cervical cancer. Checklist reviewing preventive services available has been given to the patient.    Reviewed patients plan of care and provided an AVS. The Basic Care Plan (routine screening as documented in Health Maintenance) for Angela meets the Care Plan requirement. This Care Plan has been established and reviewed with the Patient.    Counseling Resources:  ATP IV Guidelines  Pooled Cohorts Equation Calculator  Breast Cancer Risk Calculator  Breast Cancer: Medication to Reduce Risk  FRAX Risk Assessment  ICSI " Preventive Guidelines  Dietary Guidelines for Americans, 2010  USDA's MyPlate  ASA Prophylaxis  Lung CA Screening    Merary Geller MD  [facility]    Identified Health Risks:

## 2021-07-20 NOTE — RESULT ENCOUNTER NOTE
Jessica Miller,    This is to inform you regarding your test result.    Urine for Microalbumin is normal.  Your total cholesterol is normal.  HDL which is called good cholesterol is normal.  Your LDL cholesterol is normal.  This is often call bad cholesterol and high levels increase the risk for heart attacks and strokes.  Your triglycerides are normal.  Glucose which is your blood sugar is slightly elevated.  Chronic kidney disease is stable  Stay well hydrated .          Sincerely,      Dr.Nasima Yimi MD,FACP

## 2021-08-23 ENCOUNTER — OFFICE VISIT (OUTPATIENT)
Dept: OPHTHALMOLOGY | Facility: CLINIC | Age: 71
End: 2021-08-23
Payer: COMMERCIAL

## 2021-08-23 DIAGNOSIS — Z98.890 POSTOPERATIVE EYE STATE: Primary | ICD-10-CM

## 2021-08-23 PROCEDURE — 99024 POSTOP FOLLOW-UP VISIT: CPT | Performed by: OPHTHALMOLOGY

## 2021-08-23 PROCEDURE — 92015 DETERMINE REFRACTIVE STATE: CPT | Performed by: OPHTHALMOLOGY

## 2021-08-23 ASSESSMENT — SLIT LAMP EXAM - LIDS
COMMENTS: INCISIONS CLEAN/DRY/INTACT
COMMENTS: INCISIONS CLEAN/DRY/INTACT

## 2021-08-23 ASSESSMENT — TONOMETRY
IOP_METHOD: ICARE
OD_IOP_MMHG: 10
OS_IOP_MMHG: 10

## 2021-08-23 ASSESSMENT — REFRACTION_MANIFEST
OS_ADD: +2.75
OD_ADD: +2.75
OD_AXIS: 175
OS_CYLINDER: +1.00
OS_AXIS: 178
OD_CYLINDER: +2.00
OS_SPHERE: -4.00
OD_SPHERE: -4.50

## 2021-08-23 ASSESSMENT — EXTERNAL EXAM - LEFT EYE: OS_EXAM: NORMAL

## 2021-08-23 ASSESSMENT — VISUAL ACUITY
OS_CC: 20/20
METHOD: SNELLEN - LINEAR
OS_CC+: -1
OD_CC+: -1
OD_CC: 20/20

## 2021-08-23 ASSESSMENT — CONF VISUAL FIELD
OS_NORMAL: 1
OD_NORMAL: 1
METHOD: COUNTING FINGERS

## 2021-08-23 ASSESSMENT — REFRACTION_WEARINGRX
OS_ADD: +2.75
OD_AXIS: 180
OS_CYLINDER: +1.00
OS_SPHERE: -4.00
OS_AXIS: 180
SPECS_TYPE: TRIFOCAL
OD_SPHERE: -4.50
OD_ADD: +2.75
OD_CYLINDER: +2.00

## 2021-08-23 ASSESSMENT — MARGIN REFLEX DISTANCE
OD_MRD1: 3
OS_MRD1: 4

## 2021-08-23 ASSESSMENT — EXTERNAL EXAM - RIGHT EYE: OD_EXAM: NORMAL

## 2021-08-23 NOTE — NURSING NOTE
Chief Complaints and History of Present Illnesses   Patient presents with     Post Op (Ophthalmology) Both Eyes     Chief Complaint(s) and History of Present Illness(es)     Post Op (Ophthalmology) Both Eyes     Laterality: both eyes    Onset: unknown    Frequency: constantly    Course: stable    Associated symptoms: Negative for eye pain, dryness, pain with eye movement, swelling and itching    Treatments tried: no treatments and glasses    Response to treatment: significant improvement    Pain scale: 0/10              Comments     PROCEDURE PERFORMED 6/30/21:   1. Bilateral upper blepharoplasty.   2. Bilateral upper eyelid ptosis repair by Pillai's muscle conjunctival resection.     Pt here today for after procedure follow up.  States no pain or discomfort to report.  Things going well. No new concerns.  Very pleased with results.    Ocular meds = selenium 200mcg 1 tab Q day    Robert IRVIN, SMITA 8:43 AM 08/23/2021

## 2021-08-23 NOTE — PROGRESS NOTES
Chief Complaints and History of Present Illnesses   Patient presents with     Post Op (Ophthalmology) Both Eyes     Chief Complaint(s) and History of Present Illness(es)     Post Op (Ophthalmology) Both Eyes     In both eyes.  Onset was unknown.  Occurring constantly.  Since onset it   is stable.  Associated symptoms include Negative for eye pain, dryness,   pain with eye movement, swelling and itching.  Treatments tried include no   treatments and glasses.  Response to treatment was significant   improvement.  Pain was noted as 0/10.         Comments     PROCEDURE PERFORMED 6/30/21:   1. Bilateral upper blepharoplasty.   2. Bilateral upper eyelid ptosis repair by Pillai's muscle conjunctival   resection.     Pt here today for after procedure follow up.  States no pain or discomfort to report.  Things going well. No new concerns.  Very pleased with results.    Ocular meds = selenium 200mcg 1 tab Q day    Robert IRVIN, SMITA 8:43 AM 08/23/2021               Assessment & Plan     Angela Story is a 71 year old female with the following diagnoses:   1. Postoperative eye state       MANIFEST REFRACTION = prescription  See Danita Franco for cataract eval  Return to clinic as needed                 Attending Physician Attestation:  Complete documentation of historical and exam elements from today's encounter can be found in the full encounter summary report (not reduplicated in this progress note).  I personally obtained the chief complaint(s) and history of present illness.  I confirmed and edited as necessary the review of systems, past medical/surgical history, family history, social history, and examination findings as documented by others; and I examined the patient myself.  I personally reviewed the relevant tests, images, and reports as documented above.  I formulated and edited as necessary the assessment and plan and discussed the findings and management plan with the patient and family.   9:11 AM  8/23/2021

## 2021-09-02 ENCOUNTER — TRANSFERRED RECORDS (OUTPATIENT)
Dept: HEALTH INFORMATION MANAGEMENT | Facility: CLINIC | Age: 71
End: 2021-09-02

## 2021-09-09 ENCOUNTER — HOSPITAL ENCOUNTER (OUTPATIENT)
Dept: MAMMOGRAPHY | Facility: CLINIC | Age: 71
Discharge: HOME | End: 2021-09-09
Attending: INTERNAL MEDICINE | Admitting: INTERNAL MEDICINE
Payer: COMMERCIAL

## 2021-09-09 DIAGNOSIS — Z12.31 VISIT FOR SCREENING MAMMOGRAM: ICD-10-CM

## 2021-09-09 PROCEDURE — 77067 SCR MAMMO BI INCL CAD: CPT

## 2021-09-12 ENCOUNTER — HEALTH MAINTENANCE LETTER (OUTPATIENT)
Age: 71
End: 2021-09-12

## 2021-09-18 ENCOUNTER — TRANSFERRED RECORDS (OUTPATIENT)
Dept: HEALTH INFORMATION MANAGEMENT | Facility: CLINIC | Age: 71
End: 2021-09-18

## 2021-10-06 ENCOUNTER — OFFICE VISIT (OUTPATIENT)
Dept: DERMATOLOGY | Facility: CLINIC | Age: 71
End: 2021-10-06
Payer: COMMERCIAL

## 2021-10-06 VITALS — SYSTOLIC BLOOD PRESSURE: 134 MMHG | DIASTOLIC BLOOD PRESSURE: 80 MMHG

## 2021-10-06 DIAGNOSIS — D48.5 NEOPLASM OF UNCERTAIN BEHAVIOR OF SKIN: ICD-10-CM

## 2021-10-06 DIAGNOSIS — D18.01 CHERRY ANGIOMA: Primary | ICD-10-CM

## 2021-10-06 DIAGNOSIS — L82.1 SEBORRHEIC KERATOSES: ICD-10-CM

## 2021-10-06 DIAGNOSIS — D22.9 MULTIPLE BENIGN NEVI: ICD-10-CM

## 2021-10-06 DIAGNOSIS — L81.4 LENTIGINES: ICD-10-CM

## 2021-10-06 DIAGNOSIS — L82.0 INFLAMED SEBORRHEIC KERATOSIS: ICD-10-CM

## 2021-10-06 PROCEDURE — 17110 DESTRUCTION B9 LES UP TO 14: CPT | Performed by: PHYSICIAN ASSISTANT

## 2021-10-06 PROCEDURE — 99213 OFFICE O/P EST LOW 20 MIN: CPT | Mod: 25 | Performed by: PHYSICIAN ASSISTANT

## 2021-10-06 NOTE — PATIENT INSTRUCTIONS
Proper skin care from [facility]:    -Eliminate harsh soaps as they strip the natural oils from the skin, often resulting in dry itchy skin ( i.e. Dial, Zest, Diana Spring)  -Use mild soaps such as Cetaphil or Dove Sensitive Skin in the shower. You do not need to use soap on arms, legs, and trunk every time you shower unless visibly soiled.   -Avoid hot or cold showers.  -After showering, lightly dry off and apply moisturizing within 2-3 minutes. This will help trap moisture in the skin.   -Aggressive use of a moisturizer at least 1-2 times a day to the entire body (including -Vanicream, Cetaphil, Aquaphor or Cerave) and moisturize hands after every washing.  -We recommend using moisturizers that come in a tub that needs to be scooped out, not a pump. This has more of an oil base. It will hold moisture in your skin much better than a water base moisturizer. The above recommended are non-pore clogging.      Wear a sunscreen with at least SPF 30 on your face, ears, neck and V of the chest daily. Wear sunscreen on other areas of the body if those areas are exposed to the sun throughout the day. Sunscreens can contain physical and/or chemical blockers. Physical blockers are less likely to clog pores, these include zinc oxide and titanium dioxide. Reapply every two hour and after swimming.     Sunscreen examples: https://www.ewg.org/sunscreen/    UV radiation  UVA radiation remains constant throughout the day and throughout the year. It is a longer wavelength than UVB and therefore penetrates deeper into the skin leading to immediate and delayed tanning, photoaging, and skin cancer. 70-80% of UVA and UVB radiation occurs between the hours of 10am-2pm.  UVB radiation  UVB radiation causes the most harmful effects and is more significant during the summer months. However, snow and ice can reflect UVB radiation leading to skin damage during the winter months as well. UVB radiation is responsible for tanning,  burning, inflammation, delayed erythema (pinkness), pigmentation (brown spots), and skin cancer.     I recommend self monthly full body exams and yearly full body exams with a dermatology provider. If you develop a new or changing lesion please follow up for examination. Most skin cancers are pink and scaly or pink and pearly. However, we do see blue/brown/black skin cancers.  Consider the ABCDEs of melanoma when giving yourself your monthly full body exam ( don't forget the groin, buttocks, feet, toes, etc). A-asymmetry, B-borders, C-color, D-diameter, E-elevation or evolving. If you see any of these changes please follow up in clinic. If you cannot see your back I recommend purchasing a hand held mirror to use with a larger wall mirror.        WOUND CARE INSTRUCTIONS  FOR CRYOSURGERY  For questions please call [phone]        This area treated with liquid nitrogen will form a blister. You do not need to bandage the area until after the blister forms and breaks (which may be a few days).  When the blister breaks, begin daily dressing changes as follows:    1) Clean and dry the area with tap water using clean Q-tip or sterile gauze pad.    2) Apply Vaseline or Aquaphor over entire wound. Other options include Polysporin ointment or Bacitracin ointment over entire wound.  Do NOT use Neosporin ointment.    3) Cover the wound with a band-aid or sterile non-stick gauze pad and micropore paper tape.      REPEAT THESE INSTRUCTIONS AT LEAST ONCE A DAY UNTIL THE WOUND HAS COMPLETELY HEALED.        It is an old wives tale that a wound heals better when it is exposed to air and allowed to dry out. The wound will heal faster with a better cosmetic result if it is kept moist with ointment and covered with a bandage.  Do not let the wound dry out.      Supplies Needed:     *Cotton tipped applicators (Q-tips)   *Polysporin ointment or Bacitracin ointment (NOT NEOSPORIN)   *Band-aids, or non stick gauze pads and micropore  paper tape    PATIENT INFORMATION    During the healing process you will notice a number of changes. All wounds develop a small halo of redness surrounding the wound.  This means healing is occurring. Severe itching with extensive redness usually indicates sensitivity to the ointment or bandage tape used to dress the wound.  You should call our office if this develops.      Swelling and/or discoloration around your surgical site is common, particularly when performed around the eye.    All wounds normally drain.  The larger the wound the more drainage there will be.  After 7-10 days, you will notice the wound beginning to shrink and new skin will begin to grow.  The wound is healed when you can see skin has formed over the entire area.  A healed wound has a healthy, shiny look to the surface and is red to dark pink in color to normalize.  Wounds may take approximately 4-6 weeks to heal.  Larger wounds may take 6-8 weeks.  After the wound is healed you may discontinue dressing changes.    You may experience a sensation of tightness as your wound heals. This is normal and will gradually subside.    Your healed wound may be sensitive to temperature changes. This sensitivity improves with time, but if you re having a lot of discomfort, try to avoid temperature extremes.    Patients frequently experience itching after their wound appears to have healed because of the continue healing under the skin.  Plain Vaseline will help relieve the itching.

## 2021-10-06 NOTE — LETTER
10/6/2021         RE: Angela Story  [address]  [address]  [facility] [address]        Dear Colleague,    Thank you for referring your patient, Angela Story, to the [facility]. Please see a copy of my visit note below.    HPI:  Angela Story is a 71 year old female patient here today for FBE. Has a bothersome spot on right thigh and left leg.  Patient states this has been present for a while.  Patient reports the following symptoms: bothersome .  Patient reports the following previous treatments: none.  Patient reports the following modifying factors: none.  Associated symptoms: none.  Patient has no other skin complaints today.  Remainder of the HPI, Meds, PMH, Allergies, FH, and SH was reviewed in chart.    Pertinent Hx:   No personal or family history of skin cancer    Past Medical History:   Diagnosis Date     Bilateral bunions 2014     CKD (chronic kidney disease) stage 3, GFR 30-59 ml/min (H)      Gastro-oesophageal reflux disease      Hyperlipidemia LDL goal <100 2010     Hypertension goal BP (blood pressure) < 140/90 3/7/2013     IFG (impaired fasting glucose) 2016     Osteoarthritis of acromioclavicular joint      PONV (postoperative nausea and vomiting)      Thyroid nodule 2015       Past Surgical History:   Procedure Laterality Date     BIOPSY      Breast lump     BREAST SURGERY      See above     BUNIONECTOMY  2014    Procedure: BUNIONECTOMY;  Surgeon: Kevin Rodas DPM;  Location: [facility] OR     C  DELIVERY ONLY      , Low Cervical     COLONOSCOPY  ; 2018     COMBINED REPAIR PTOSIS WITH BLEPHAROPLASTY Bilateral 2021    Procedure: Bilateral upper eyelid blepharoplasty wtih ptosis repair;  Surgeon: Gregor Harris MD;  Location: [facility] OR     EXCISE MASS BACK  2014    Procedure: EXCISE MASS BACK;  Surgeon: Chaim Lacy MD;  Location: [facility] OR     HC ARTHROTOMY W/OPEN MENISCUS REPAIR      lt knee      HC DILATION/CURETTAGE DIAG/THER NON OB  1982    D & C     surgery for ptosis of both eyelids       TONSILLECTOMY  1966    tonsillectomy     ZZC NONSPECIFIC PROCEDURE  1986    lumpectomy, lt     ZZC NONSPECIFIC PROCEDURE      ob      ZZC NONSPECIFIC PROCEDURE  1983    Vaginal delivery x 1        Family History   Problem Relation Age of Onset     Lipids Mother      Hypertension Mother      Hyperlipidemia Mother      Cancer Father         pancreatic     Alcohol/Drug Father         etoh     Other Cancer Father      Depression Father      Substance Abuse Father      Heart Disease Maternal Grandfather      Coronary Artery Disease Maternal Grandfather      Cerebrovascular Disease Paternal Grandfather      Psychotic Disorder Sister         bi polar     Hypertension Sister      Neurologic Disorder Sister         ms     Hypertension Sister      Mental Illness Sister         BiPolar     Anesthesia Reaction Sister      Thyroid Disease Sister      Obesity Sister      Neurologic Disorder Sister         cidp     Cerebrovascular Disease Paternal Grandmother      Hypertension Brother      Obesity Brother        Social History     Socioeconomic History     Marital status:      Spouse name: Shane     Number of children: 2     Years of education: Not on file     Highest education level: Not on file   Occupational History     Occupation: medical case manager     Employer: [employer]   Tobacco Use     Smoking status: Never Smoker     Smokeless tobacco: Never Used   Substance and Sexual Activity     Alcohol use: Yes     Comment: 1 per week     Drug use: Never     Sexual activity: Yes     Partners: Male     Birth control/protection: Post-menopausal   Other Topics Concern     Parent/sibling w/ CABG, MI or angioplasty before 65F 55M? No   Social History Narrative    Caffeine intake/servings daily - 2-3    Calcium intake/servings daily - 2    Exercise 5-6 times weekly - describe walking, tredmill    Sunscreen used - No     Seatbelts used - Yes    Guns stored in the home - No    Self Breast Exam - No    Pap test up to date -  Yes    Eye exam up to date -  No    Dental exam up to date -  Yes    DEXA scan up to date -  Not Applicable    Flex Sig/Colonoscopy up to date -  Yes    Mammography up to date -  Yes    Immunizations reviewed and up to date - Yes    Abuse: Current or Past (Physical, Sexual or Emotional) - No    Do you feel safe in your environment - Yes    Do you cope well with stress - Yes    Do you suffer from insomnia - Some    Last updated by: Monalisa Sanchez  10/31/2005        Balanced Diet - Yes    Osteoporosis Prevention Measures - Dairy servings per day: 1 to 2 servings daily    Regular Exercise -  Yes Describe walking, aerobics type exercised, weight, total of 1 hour 6 days a week    Dental Exam - YES - Date: 8-07    Eye Exam - YES - Date: has one next week    Self Breast Exam - No    Abuse: Current or Past (Physical, Sexual or Emotional)- No    Do you feel safe in your environment - Yes    Guns stored in the home - No    Sunscreen used - Yes    Seatbelts used - Yes    Lipids -  YES - Date: 11-06    Glucose -  YES - Date: 1-03    Colon Cancer Screening - Flexible Sigmoidoscopy 1-03(date completed)    Hemoccults - YES - Date: 3-03    Pap Test -  YES - Date: 11-06, and 10-07    Do you have any concerns about STD's -  No    Mammography - YES - Date: 11-06    DEXA - NO    Immunizations reviewed and up to date - thinks she's had dt in the last 10 years.    11-08-07  CHASITY Munguia CMA     Social Determinants of Health     Financial Resource Strain:      Difficulty of Paying Living Expenses:    Food Insecurity:      Worried About Running Out of Food in the Last Year:      Ran Out of Food in the Last Year:    Transportation Needs:      Lack of Transportation (Medical):      Lack of Transportation (Non-Medical):    Physical Activity:      Days of Exercise per Week:      Minutes of Exercise per Session:    Stress:      Feeling of Stress  :    Social Connections:      Frequency of Communication with Friends and Family:      Frequency of Social Gatherings with Friends and Family:      Attends [employer]oration[employer] Services:      Active Member of Clubs or Organizations:      Attends Club or Organization Meetings:      Marital Status:    Intimate Partner Violence:      Fear of Current or Ex-Partner:      Emotionally Abused:      Physically Abused:      Sexually Abused:        Outpatient Encounter Medications as of 10/6/2021   Medication Sig Dispense Refill     Blood Pressure Monitoring KIT 1 kit daily 1 kit 0     cholecalciferol 25 MCG (1000 UT) TABS Take 1,000 Units by mouth       citalopram (CELEXA) 10 MG tablet Take 1 tablet (10 mg) by mouth daily 90 tablet 1     levothyroxine (SYNTHROID/LEVOTHROID) 25 MCG tablet 37.5 mg daily 90 tablet 3     losartan (COZAAR) 50 MG tablet Take 1 tablet (50 mg) by mouth daily for Blood Pressure 90 tablet 3     nystatin (MYCOSTATIN) 274453 UNIT/GM external powder Apply topically 2 times daily 60 g 1     omeprazole (PRILOSEC) 20 MG DR capsule Take 1 capsule (20 mg) by mouth daily as needed 90 capsule 3     pravastatin (PRAVACHOL) 20 MG tablet Take 1 tablet (20 mg) by mouth daily for cholestrol 90 tablet 3     scopolamine (TRANSDERM) 72 hr patch Apply 1 patch to hairless area behind one ear at least 4 hours before travel.  Remove old patch and change every 3 days (72 hours). (Patient taking differently: Place 1 patch onto the skin as needed Apply 1 patch to hairless area behind one ear at least 4 hours before travel.  Remove old patch and change every 3 days (72 hours).) 4 patch 11     Selenium 200 MCG TABS tablet Take 200 mcg by mouth daily       vitamin D3 (CHOLECALCIFEROL) 50 mcg (2000 units) tablet Take 1 tablet (50 mcg) by mouth daily       No facility-administered encounter medications on file as of 10/6/2021.       Review Of Systems:  Skin: spots  Eyes: negative  Ears/Nose/Throat: negative  Respiratory: No shortness of  breath, dyspnea on exertion, cough, or hemoptysis  Cardiovascular: negative  Gastrointestinal: negative  Genitourinary: negative  Musculoskeletal: negative  Neurologic: negative  Psychiatric: negative  Hematologic/Lymphatic/Immunologic: negative  Endocrine: negative      Objective:     /80   LMP 07/23/2004   Eyes: Conjunctivae/lids: Normal   ENT: Lips:  Normal  MSK: Normal  Cardiovascular: Peripheral edema none  Pulm: Breathing Normal  Neuro/Psych: Orientation: A/O x 3. Normal; Mood/Affect: Normal, NAD, WDWN  Pt accompanied by: self  Following areas examined: Scalp, face, eyelids, lips, neck, chest, abdomen, back, R&L upper and lower extremities, buttock, hips.  Pt defers exam of groin and genitals.   Sam skin type:ii   Findings:  Red smooth well-defined macules on trunk and extremities.  Brown, stuck-on scaly appearing papules on trunk and extremities.  Well circumscribed macules with symmetric color distribution on trunk and extremities.  Tan WD smooth macules on face, neck, trunk, and extremities.  Red papule with collarette of scale on LLE  Inflamed brown, stuck-on scaly appearing papules right ventral thigh    Assessment and Plan:     1) Cherry angiomas, Seborrheic keratoses, Benign nevi, Lentigines     I discussed the specifics of tumor, prognosis, and genetics of benign lesions.  I explained that treatment of these lesions would be purely cosmetic and not medically neccessary.  I discussed with patient different removal options including excision, cryotherapy, cautery and /or laser.  Lesion may recur and/or may not completely resolve. May need additional treatment.     2) NUB  DF vs poroma  Per pt bx in the past and was benign  LN2: Treated with LN2 for 5s for 1-2 cycles. Warned risks of blistering, pain, pigment change, scarring, and incomplete resolution.  Advised patient to return if lesions do not completely resolve within 2-3 months.  Wound care sheet given.  If no resolution please follow  up  3) ISK x 1  Benign etiology and course of lesion.  LN2: Treated with LN2 for 5s for 1-2 cycles. Warned risks of blistering, pain, pigment change, scarring, and incomplete resolution.  Advised patient to return if lesions do not completely resolve within 2-3 months.  Wound care sheet given.      Signs and Symptoms of non-melanoma skin cancer and ABCDEs of melanoma reviewed with patient. Patient encouraged to perform monthly self skin exams and educated on how to perform them. UV precautions reviewed with patient. Patient was asked about new or changing moles/lesions on body.   Wear a sunscreen with at least SPF 30 on your face, ears, neck and V of the chest daily. Wear sunscreen on other areas of the body if those areas are exposed to the sun throughout the day. Sunscreens can contain physical and/or chemical blockers. Physical blockers are less likely to clog pores, these include zinc oxide and titanium dioxide. Reapply every two hour and after swimming.Sunscreen examples: https://www.ewg.org/sunscreen/    Proper skin care from [facility]:    -Eliminate harsh soaps as they strip the natural oils from the skin, often resulting in dry itchy skin ( i.e. Dial, Zest, [place] Spring)  -Use mild soaps such as Cetaphil or Dove Sensitive Skin in the shower. You do not need to use soap on arms, legs, and trunk every time you shower unless visibly soiled.   -Avoid hot or cold showers.  -After showering, lightly dry off and apply moisturizing within 2-3 minutes. This will help trap moisture in the skin.   -Aggressive use of a moisturizer at least 1-2 times a day to the entire body (including -Vanicream, Cetaphil, Aquaphor or Cerave) and moisturize hands after every washing.  -We recommend using moisturizers that come in a tub that needs to be scooped out, not a pump. This has more of an oil base. It will hold moisture in your skin much better than a water base moisturizer. The above recommended are non-pore  clogging.         It was a pleasure speaking with Angela Story today.       Follow up in yearly FBE        Again, thank you for allowing me to participate in the care of your patient.        Sincerely,        Kareen Bell PA-C

## 2021-10-06 NOTE — PROGRESS NOTES
HPI:  Angela Story is a 71 year old female patient here today for FBE. Has a bothersome spot on right thigh and left leg.  Patient states this has been present for a while.  Patient reports the following symptoms: bothersome .  Patient reports the following previous treatments: none.  Patient reports the following modifying factors: none.  Associated symptoms: none.  Patient has no other skin complaints today.  Remainder of the HPI, Meds, PMH, Allergies, FH, and SH was reviewed in chart.    Pertinent Hx:   No personal or family history of skin cancer    Past Medical History:   Diagnosis Date     Bilateral bunions 2014     CKD (chronic kidney disease) stage 3, GFR 30-59 ml/min (H)      Gastro-oesophageal reflux disease      Hyperlipidemia LDL goal <100 2010     Hypertension goal BP (blood pressure) < 140/90 3/7/2013     IFG (impaired fasting glucose) 2016     Osteoarthritis of acromioclavicular joint      PONV (postoperative nausea and vomiting)      Thyroid nodule 2015       Past Surgical History:   Procedure Laterality Date     BIOPSY      Breast lump     BREAST SURGERY      See above     BUNIONECTOMY  2014    Procedure: BUNIONECTOMY;  Surgeon: Kevin Rodas DPM;  Location: [facility] OR     C  DELIVERY ONLY      , Low Cervical     COLONOSCOPY  ; 2018     COMBINED REPAIR PTOSIS WITH BLEPHAROPLASTY Bilateral 2021    Procedure: Bilateral upper eyelid blepharoplasty wtih ptosis repair;  Surgeon: Gregor Harris MD;  Location: [facility] OR     EXCISE MASS BACK  2014    Procedure: EXCISE MASS BACK;  Surgeon: Chaim Lacy MD;  Location: [facility] OR     HC ARTHROTOMY W/OPEN MENISCUS REPAIR      lt knee     HC DILATION/CURETTAGE DIAG/THER NON OB      D & C     surgery for ptosis of both eyelids       TONSILLECTOMY      tonsillectomy     ZZC NONSPECIFIC PROCEDURE      lumpectomy, lt     ZZC NONSPECIFIC PROCEDURE      ob      ZZC NONSPECIFIC PROCEDURE   1983    Vaginal delivery x 1        Family History   Problem Relation Age of Onset     Lipids Mother      Hypertension Mother      Hyperlipidemia Mother      Cancer Father         pancreatic     Alcohol/Drug Father         etoh     Other Cancer Father      Depression Father      Substance Abuse Father      Heart Disease Maternal Grandfather      Coronary Artery Disease Maternal Grandfather      Cerebrovascular Disease Paternal Grandfather      Psychotic Disorder Sister         bi polar     Hypertension Sister      Neurologic Disorder Sister         ms     Hypertension Sister      Mental Illness Sister         BiPolar     Anesthesia Reaction Sister      Thyroid Disease Sister      Obesity Sister      Neurologic Disorder Sister         cidp     Cerebrovascular Disease Paternal Grandmother      Hypertension Brother      Obesity Brother        Social History     Socioeconomic History     Marital status:      Spouse name: Shane     Number of children: 2     Years of education: Not on file     Highest education level: Not on file   Occupational History     Occupation: medical case manager     Employer: [employer]   Tobacco Use     Smoking status: Never Smoker     Smokeless tobacco: Never Used   Substance and Sexual Activity     Alcohol use: Yes     Comment: 1 per week     Drug use: Never     Sexual activity: Yes     Partners: Male     Birth control/protection: Post-menopausal   Other Topics Concern     Parent/sibling w/ CABG, MI or angioplasty before 65F 55M? No   Social History Narrative    Caffeine intake/servings daily - 2-3    Calcium intake/servings daily - 2    Exercise 5-6 times weekly - describe walking, tredmill    Sunscreen used - No    Seatbelts used - Yes    Guns stored in the home - No    Self Breast Exam - No    Pap test up to date -  Yes    Eye exam up to date -  No    Dental exam up to date -  Yes    DEXA scan up to date -  Not Applicable    Flex Sig/Colonoscopy up to date -  Yes     Mammography up to date -  Yes    Immunizations reviewed and up to date - Yes    Abuse: Current or Past (Physical, Sexual or Emotional) - No    Do you feel safe in your environment - Yes    Do you cope well with stress - Yes    Do you suffer from insomnia - Some    Last updated by: Monalisa Sanchez  10/31/2005        Balanced Diet - Yes    Osteoporosis Prevention Measures - Dairy servings per day: 1 to 2 servings daily    Regular Exercise -  Yes Describe walking, aerobics type exercised, weight, total of 1 hour 6 days a week    Dental Exam - YES - Date: 8-07    Eye Exam - YES - Date: has one next week    Self Breast Exam - No    Abuse: Current or Past (Physical, Sexual or Emotional)- No    Do you feel safe in your environment - Yes    Guns stored in the home - No    Sunscreen used - Yes    Seatbelts used - Yes    Lipids -  YES - Date: 11-06    Glucose -  YES - Date: 1-03    Colon Cancer Screening - Flexible Sigmoidoscopy 1-03(date completed)    Hemoccults - YES - Date: 3-03    Pap Test -  YES - Date: 11-06, and 10-07    Do you have any concerns about STD's -  No    Mammography - YES - Date: 11-06    DEXA - NO    Immunizations reviewed and up to date - thinks she's had dt in the last 10 years.    11-08-07  CHASITY Munguia CMA     Social Determinants of Health     Financial Resource Strain:      Difficulty of Paying Living Expenses:    Food Insecurity:      Worried About Running Out of Food in the Last Year:      Ran Out of Food in the Last Year:    Transportation Needs:      Lack of Transportation (Medical):      Lack of Transportation (Non-Medical):    Physical Activity:      Days of Exercise per Week:      Minutes of Exercise per Session:    Stress:      Feeling of Stress :    Social Connections:      Frequency of Communication with Friends and Family:      Frequency of Social Gatherings with Friends and Family:      Attends Pentecostal Services:      Active Member of Clubs or Organizations:      Attends Club or Organization  Meetings:      Marital Status:    Intimate Partner Violence:      Fear of Current or Ex-Partner:      Emotionally Abused:      Physically Abused:      Sexually Abused:        Outpatient Encounter Medications as of 10/6/2021   Medication Sig Dispense Refill     Blood Pressure Monitoring KIT 1 kit daily 1 kit 0     cholecalciferol 25 MCG (1000 UT) TABS Take 1,000 Units by mouth       citalopram (CELEXA) 10 MG tablet Take 1 tablet (10 mg) by mouth daily 90 tablet 1     levothyroxine (SYNTHROID/LEVOTHROID) 25 MCG tablet 37.5 mg daily 90 tablet 3     losartan (COZAAR) 50 MG tablet Take 1 tablet (50 mg) by mouth daily for Blood Pressure 90 tablet 3     nystatin (MYCOSTATIN) 684497 UNIT/GM external powder Apply topically 2 times daily 60 g 1     omeprazole (PRILOSEC) 20 MG DR capsule Take 1 capsule (20 mg) by mouth daily as needed 90 capsule 3     pravastatin (PRAVACHOL) 20 MG tablet Take 1 tablet (20 mg) by mouth daily for cholestrol 90 tablet 3     scopolamine (TRANSDERM) 72 hr patch Apply 1 patch to hairless area behind one ear at least 4 hours before travel.  Remove old patch and change every 3 days (72 hours). (Patient taking differently: Place 1 patch onto the skin as needed Apply 1 patch to hairless area behind one ear at least 4 hours before travel.  Remove old patch and change every 3 days (72 hours).) 4 patch 11     Selenium 200 MCG TABS tablet Take 200 mcg by mouth daily       vitamin D3 (CHOLECALCIFEROL) 50 mcg (2000 units) tablet Take 1 tablet (50 mcg) by mouth daily       No facility-administered encounter medications on file as of 10/6/2021.       Review Of Systems:  Skin: spots  Eyes: negative  Ears/Nose/Throat: negative  Respiratory: No shortness of breath, dyspnea on exertion, cough, or hemoptysis  Cardiovascular: negative  Gastrointestinal: negative  Genitourinary: negative  Musculoskeletal: negative  Neurologic: negative  Psychiatric: negative  Hematologic/Lymphatic/Immunologic: negative  Endocrine:  negative      Objective:     /80   LMP 07/23/2004   Eyes: Conjunctivae/lids: Normal   ENT: Lips:  Normal  MSK: Normal  Cardiovascular: Peripheral edema none  Pulm: Breathing Normal  Neuro/Psych: Orientation: A/O x 3. Normal; Mood/Affect: Normal, NAD, WDWN  Pt accompanied by: self  Following areas examined: Scalp, face, eyelids, lips, neck, chest, abdomen, back, R&L upper and lower extremities, buttock, hips.  Pt defers exam of groin and genitals.   Sam skin type:ii   Findings:  Red smooth well-defined macules on trunk and extremities.  Brown, stuck-on scaly appearing papules on trunk and extremities.  Well circumscribed macules with symmetric color distribution on trunk and extremities.  Tan WD smooth macules on face, neck, trunk, and extremities.  Red papule with collarette of scale on LLE  Inflamed brown, stuck-on scaly appearing papules right ventral thigh    Assessment and Plan:     1) Cherry angiomas, Seborrheic keratoses, Benign nevi, Lentigines     I discussed the specifics of tumor, prognosis, and genetics of benign lesions.  I explained that treatment of these lesions would be purely cosmetic and not medically neccessary.  I discussed with patient different removal options including excision, cryotherapy, cautery and /or laser.  Lesion may recur and/or may not completely resolve. May need additional treatment.     2) NUB  DF vs poroma  Per pt bx in the past and was benign  LN2: Treated with LN2 for 5s for 1-2 cycles. Warned risks of blistering, pain, pigment change, scarring, and incomplete resolution.  Advised patient to return if lesions do not completely resolve within 2-3 months.  Wound care sheet given.  If no resolution please follow up  3) ISK x 1  Benign etiology and course of lesion.  LN2: Treated with LN2 for 5s for 1-2 cycles. Warned risks of blistering, pain, pigment change, scarring, and incomplete resolution.  Advised patient to return if lesions do not completely resolve within  2-3 months.  Wound care sheet given.      Signs and Symptoms of non-melanoma skin cancer and ABCDEs of melanoma reviewed with patient. Patient encouraged to perform monthly self skin exams and educated on how to perform them. UV precautions reviewed with patient. Patient was asked about new or changing moles/lesions on body.   Wear a sunscreen with at least SPF 30 on your face, ears, neck and V of the chest daily. Wear sunscreen on other areas of the body if those areas are exposed to the sun throughout the day. Sunscreens can contain physical and/or chemical blockers. Physical blockers are less likely to clog pores, these include zinc oxide and titanium dioxide. Reapply every two hour and after swimming.Sunscreen examples: https://www.ewg.org/sunscreen/    Proper skin care from [facility]:    -Eliminate harsh soaps as they strip the natural oils from the skin, often resulting in dry itchy skin ( i.e. Dial, Zest, [place])  -Use mild soaps such as Cetaphil or Dove Sensitive Skin in the shower. You do not need to use soap on arms, legs, and trunk every time you shower unless visibly soiled.   -Avoid hot or cold showers.  -After showering, lightly dry off and apply moisturizing within 2-3 minutes. This will help trap moisture in the skin.   -Aggressive use of a moisturizer at least 1-2 times a day to the entire body (including -Vanicream, Cetaphil, Aquaphor or Cerave) and moisturize hands after every washing.  -We recommend using moisturizers that come in a tub that needs to be scooped out, not a pump. This has more of an oil base. It will hold moisture in your skin much better than a water base moisturizer. The above recommended are non-pore clogging.         It was a pleasure speaking with Angela Story today.       Follow up in yearly FBE

## 2022-01-20 ENCOUNTER — LAB (OUTPATIENT)
Dept: LAB | Facility: CLINIC | Age: 72
End: 2022-01-20
Payer: COMMERCIAL

## 2022-01-20 DIAGNOSIS — N18.31 STAGE 3A CHRONIC KIDNEY DISEASE (H): ICD-10-CM

## 2022-01-20 DIAGNOSIS — Z13.0 SCREENING FOR DEFICIENCY ANEMIA: ICD-10-CM

## 2022-01-20 DIAGNOSIS — E06.3 HYPOTHYROIDISM DUE TO HASHIMOTO'S THYROIDITIS: ICD-10-CM

## 2022-01-20 LAB
ERYTHROCYTE [DISTWIDTH] IN BLOOD BY AUTOMATED COUNT: 12.4 % (ref 10–15)
HCT VFR BLD AUTO: 43.7 % (ref 35–47)
HGB BLD-MCNC: 14.2 G/DL (ref 11.7–15.7)
MCH RBC QN AUTO: 30.2 PG (ref 26.5–33)
MCHC RBC AUTO-ENTMCNC: 32.5 G/DL (ref 31.5–36.5)
MCV RBC AUTO: 93 FL (ref 78–100)
PLATELET # BLD AUTO: 187 10E3/UL (ref 150–450)
RBC # BLD AUTO: 4.7 10E6/UL (ref 3.8–5.2)
WBC # BLD AUTO: 6.9 10E3/UL (ref 4–11)

## 2022-01-20 PROCEDURE — 36415 COLL VENOUS BLD VENIPUNCTURE: CPT

## 2022-01-20 PROCEDURE — 85027 COMPLETE CBC AUTOMATED: CPT

## 2022-01-20 PROCEDURE — 84443 ASSAY THYROID STIM HORMONE: CPT

## 2022-01-20 NOTE — RESULT ENCOUNTER NOTE
Jessica Miller,    This is to inform you regarding your test result.    CBC result which includes white count Hemoglobin and  Platelet Counts is normal.   Other test results are pending.          Sincerely,      Dr.Nasima Yimi MD,FACP

## 2022-01-21 LAB — TSH SERPL DL<=0.005 MIU/L-ACNC: 3.5 MU/L (ref 0.4–4)

## 2022-01-23 NOTE — RESULT ENCOUNTER NOTE
Jessica Miller,    This is to inform you regarding your test result.    TSH which is thyroid hormone is normal.  CBC result which includes white count Hemoglobin and  Platelet Counts is normal.       Sincerely,      Dr.Nasima Yimi MD,FACP

## 2022-02-16 ENCOUNTER — TRANSFERRED RECORDS (OUTPATIENT)
Dept: HEALTH INFORMATION MANAGEMENT | Facility: CLINIC | Age: 72
End: 2022-02-16
Payer: COMMERCIAL

## 2022-02-21 ENCOUNTER — TELEPHONE (OUTPATIENT)
Dept: FAMILY MEDICINE | Facility: CLINIC | Age: 72
End: 2022-02-21
Payer: COMMERCIAL

## 2022-02-21 NOTE — TELEPHONE ENCOUNTER
TO PCP:     Patient called after she saw you in July she started tracking BPs      BP was 108//64 - Stopped then took periodically     Last Wednesday had 2 knee steroid injections, felt very flushed next day- BP was 180/90     Since then BP running about 134-129/84 -   Evening readings seem higher 165/85    144/84 today     Is unsure if current higher readings could still be related to steroid injections, has not been monitoring BPs recently     HR - has been up a little bit too, in July was 65-70. Last night 87     Otherwise feels fine     Please advise - how long should pt anticipate steroid injection affect her BP readings? Pt will continue monitoring BP     Alex Cintron RN  [facility]

## 2022-02-21 NOTE — TELEPHONE ENCOUNTER
Patient Contact    Attempt # 1    Was call answered?  No.  Left message on voicemail with information to call back.    Tish SOARES, Triage RN  [facility]

## 2022-02-21 NOTE — TELEPHONE ENCOUNTER
Patient was informed of providers message. She verbalized understanding and agreed to continue monitoring BP and contact clinic if any new symptoms or if BP continues to be elevated after 10 days.

## 2022-02-24 ENCOUNTER — TRANSFERRED RECORDS (OUTPATIENT)
Dept: HEALTH INFORMATION MANAGEMENT | Facility: CLINIC | Age: 72
End: 2022-02-24
Payer: COMMERCIAL

## 2022-02-28 ENCOUNTER — E-VISIT (OUTPATIENT)
Dept: FAMILY MEDICINE | Facility: CLINIC | Age: 72
End: 2022-02-28
Payer: COMMERCIAL

## 2022-02-28 DIAGNOSIS — I10 BENIGN HYPERTENSION: ICD-10-CM

## 2022-02-28 PROCEDURE — 99421 OL DIG E/M SVC 5-10 MIN: CPT | Performed by: INTERNAL MEDICINE

## 2022-02-28 RX ORDER — LOSARTAN POTASSIUM 50 MG/1
50 TABLET ORAL 2 TIMES DAILY
Qty: 180 TABLET | Refills: 1 | Status: SHIPPED | OUTPATIENT
Start: 2022-02-28 | End: 2022-08-29

## 2022-02-28 NOTE — TELEPHONE ENCOUNTER
Provider E-Visit time total (minutes): 7 minutes      Spoke to the patient  Increase the dose of losartan to 50 mg twice a day  Keep monitoring your blood pressure  Let me know if it stays consistently high  If it is stays greater than  40/90 then I will add 1 more medication  Cortisone injection could have affected her blood pressure also  So ongoing monitoring is required  Patient asked me to send the prescription to mail order pharmacy  Dr.Nasima Yimi MD

## 2022-02-28 NOTE — PATIENT INSTRUCTIONS
Thank you for choosing us for your care. I have placed an order for a prescription so that you can start treatment. View your full visit summary for details by clicking on the link below. Your pharmacist will able to address any questions you may have about the medication.     If you're not feeling better within 5-7 days, please schedule an appointment.  You can schedule an appointment right here in [facility], or call [phone]  If the visit is for the same symptoms as your eVisit, we'll refund the cost of your eVisit if seen within seven days.

## 2022-03-21 ENCOUNTER — NURSE TRIAGE (OUTPATIENT)
Dept: FAMILY MEDICINE | Facility: CLINIC | Age: 72
End: 2022-03-21
Payer: COMMERCIAL

## 2022-03-21 NOTE — TELEPHONE ENCOUNTER
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 Advised it could be the same virus but possible it could be something else too, recommended home COVID19 test to rule that out     Discussed call back if new/worsening symptoms or sinus concerns >10 days     Tish SOARES, Triage RN  [facility]     Reason for Disposition    Sinus congestion as part of a cold, present < 10 days    Additional Information    Negative: Sounds like a life-threatening emergency to the triager    Negative: Difficulty breathing, and not from stuffy nose (e.g., not relieved by cleaning out the nose)    Negative: SEVERE headache and has fever    Negative: Patient sounds very sick or weak to the triager    Negative: SEVERE sinus pain    Negative: Severe headache    Negative: Redness or swelling on the cheek, forehead, or around the eye    Negative: Fever > 103 F (39.4 C)    Negative: Fever > 101 F (38.3 C) and over 60 years of age    Negative: Fever > 100.0 F (37.8 C) and has diabetes mellitus or a weak immune system (e.g., HIV positive, cancer chemotherapy, organ transplant, splenectomy, chronic steroids)    Negative: Fever > 100.0 F (37.8 C) and bedridden (e.g., nursing home patient, stroke, chronic illness, recovering from surgery)    Negative: Fever present > 3 days (72 hours)    Negative: Fever returns after gone for over 24 hours and symptoms worse or not improved    Negative: Sinus pain (not just congestion) and fever    Negative: Earache    Negative: Sinus congestion (pressure, fullness) present > 10 days    Negative: Nasal discharge present > 10 days    Negative: Using  nasal washes and pain medicine > 24 hours and sinus pain (lower forehead, cheekbone, or eye) persists    Negative: Lots of coughing    Negative: Patient wants to be seen    Protocols used: SINUS PAIN OR CONGESTION-A-OH

## 2022-04-27 ENCOUNTER — TRANSFERRED RECORDS (OUTPATIENT)
Dept: HEALTH INFORMATION MANAGEMENT | Facility: CLINIC | Age: 72
End: 2022-04-27
Payer: COMMERCIAL

## 2022-04-29 ENCOUNTER — MYC MEDICAL ADVICE (OUTPATIENT)
Dept: FAMILY MEDICINE | Facility: CLINIC | Age: 72
End: 2022-04-29
Payer: COMMERCIAL

## 2022-05-05 DIAGNOSIS — F41.1 GENERALIZED ANXIETY DISORDER: ICD-10-CM

## 2022-05-09 RX ORDER — CITALOPRAM HYDROBROMIDE 10 MG/1
10 TABLET ORAL DAILY
Qty: 90 TABLET | Refills: 0 | Status: SHIPPED | OUTPATIENT
Start: 2022-05-09 | End: 2022-07-28

## 2022-05-09 NOTE — TELEPHONE ENCOUNTER
Medication filled one time only as pt is due for a follow-up for further refills.  Patient is already scheduled for upcoming appointment.  Letty Urbina RN

## 2022-07-27 DIAGNOSIS — F41.1 GENERALIZED ANXIETY DISORDER: ICD-10-CM

## 2022-07-28 ENCOUNTER — TRANSFERRED RECORDS (OUTPATIENT)
Dept: HEALTH INFORMATION MANAGEMENT | Facility: CLINIC | Age: 72
End: 2022-07-28

## 2022-07-28 RX ORDER — CITALOPRAM HYDROBROMIDE 10 MG/1
10 TABLET ORAL DAILY
Qty: 90 TABLET | Refills: 0 | Status: SHIPPED | OUTPATIENT
Start: 2022-07-28 | End: 2022-08-29

## 2022-08-23 ASSESSMENT — ENCOUNTER SYMPTOMS
JOINT SWELLING: 1
ARTHRALGIAS: 1
MYALGIAS: 1
BREAST MASS: 0

## 2022-08-23 ASSESSMENT — ACTIVITIES OF DAILY LIVING (ADL): CURRENT_FUNCTION: NO ASSISTANCE NEEDED

## 2022-08-25 ENCOUNTER — TRANSFERRED RECORDS (OUTPATIENT)
Dept: HEALTH INFORMATION MANAGEMENT | Facility: CLINIC | Age: 72
End: 2022-08-25

## 2022-08-29 ENCOUNTER — OFFICE VISIT (OUTPATIENT)
Dept: FAMILY MEDICINE | Facility: CLINIC | Age: 72
End: 2022-08-29
Payer: COMMERCIAL

## 2022-08-29 ENCOUNTER — TRANSFERRED RECORDS (OUTPATIENT)
Dept: HEALTH INFORMATION MANAGEMENT | Facility: CLINIC | Age: 72
End: 2022-08-29

## 2022-08-29 VITALS
DIASTOLIC BLOOD PRESSURE: 82 MMHG | BODY MASS INDEX: 35.68 KG/M2 | OXYGEN SATURATION: 98 % | HEIGHT: 61 IN | HEART RATE: 64 BPM | SYSTOLIC BLOOD PRESSURE: 140 MMHG | WEIGHT: 189 LBS | TEMPERATURE: 96.9 F

## 2022-08-29 DIAGNOSIS — Z86.16 HISTORY OF COVID-19: ICD-10-CM

## 2022-08-29 DIAGNOSIS — N18.31 STAGE 3A CHRONIC KIDNEY DISEASE (H): ICD-10-CM

## 2022-08-29 DIAGNOSIS — B37.9 CANDIDA INFECTION: ICD-10-CM

## 2022-08-29 DIAGNOSIS — Z78.0 ASYMPTOMATIC POSTMENOPAUSAL STATUS: ICD-10-CM

## 2022-08-29 DIAGNOSIS — R05.9 COUGH: ICD-10-CM

## 2022-08-29 DIAGNOSIS — Z12.31 VISIT FOR SCREENING MAMMOGRAM: ICD-10-CM

## 2022-08-29 DIAGNOSIS — R76.8 ANTI-TPO ANTIBODIES PRESENT: ICD-10-CM

## 2022-08-29 DIAGNOSIS — E66.01 MORBID OBESITY (H): ICD-10-CM

## 2022-08-29 DIAGNOSIS — Z00.00 ENCOUNTER FOR MEDICARE ANNUAL WELLNESS EXAM: Primary | ICD-10-CM

## 2022-08-29 DIAGNOSIS — E06.3 HYPOTHYROIDISM DUE TO HASHIMOTO'S THYROIDITIS: ICD-10-CM

## 2022-08-29 DIAGNOSIS — I10 BENIGN HYPERTENSION: ICD-10-CM

## 2022-08-29 DIAGNOSIS — E78.5 HYPERLIPIDEMIA, UNSPECIFIED HYPERLIPIDEMIA TYPE: ICD-10-CM

## 2022-08-29 DIAGNOSIS — F41.1 GENERALIZED ANXIETY DISORDER: ICD-10-CM

## 2022-08-29 LAB
ALBUMIN UR-MCNC: NEGATIVE MG/DL
APPEARANCE UR: CLEAR
BACTERIA #/AREA URNS HPF: NORMAL /HPF
BILIRUB UR QL STRIP: NEGATIVE
COLOR UR AUTO: YELLOW
GLUCOSE UR STRIP-MCNC: NEGATIVE MG/DL
HGB UR QL STRIP: ABNORMAL
KETONES UR STRIP-MCNC: NEGATIVE MG/DL
LEUKOCYTE ESTERASE UR QL STRIP: NEGATIVE
NITRATE UR QL: NEGATIVE
PH UR STRIP: 6 [PH] (ref 5–7)
RBC #/AREA URNS AUTO: NORMAL /HPF
SP GR UR STRIP: <=1.005 (ref 1–1.03)
SQUAMOUS #/AREA URNS AUTO: NORMAL /LPF
UROBILINOGEN UR STRIP-ACNC: 0.2 E.U./DL
WBC #/AREA URNS AUTO: NORMAL /HPF

## 2022-08-29 PROCEDURE — 82043 UR ALBUMIN QUANTITATIVE: CPT | Performed by: INTERNAL MEDICINE

## 2022-08-29 PROCEDURE — 36415 COLL VENOUS BLD VENIPUNCTURE: CPT | Performed by: INTERNAL MEDICINE

## 2022-08-29 PROCEDURE — 99214 OFFICE O/P EST MOD 30 MIN: CPT | Mod: 25 | Performed by: INTERNAL MEDICINE

## 2022-08-29 PROCEDURE — 81001 URINALYSIS AUTO W/SCOPE: CPT | Performed by: INTERNAL MEDICINE

## 2022-08-29 PROCEDURE — 84100 ASSAY OF PHOSPHORUS: CPT | Performed by: INTERNAL MEDICINE

## 2022-08-29 PROCEDURE — G0439 PPPS, SUBSEQ VISIT: HCPCS | Performed by: INTERNAL MEDICINE

## 2022-08-29 PROCEDURE — 80061 LIPID PANEL: CPT | Performed by: INTERNAL MEDICINE

## 2022-08-29 PROCEDURE — 80053 COMPREHEN METABOLIC PANEL: CPT | Performed by: INTERNAL MEDICINE

## 2022-08-29 RX ORDER — LEVOTHYROXINE SODIUM 25 UG/1
37.5 TABLET ORAL DAILY
Qty: 135 TABLET | Refills: 3 | Status: SHIPPED | OUTPATIENT
Start: 2022-08-29 | End: 2023-05-01

## 2022-08-29 RX ORDER — NYSTATIN 100000 [USP'U]/G
POWDER TOPICAL 2 TIMES DAILY
Qty: 60 G | Refills: 1 | Status: SHIPPED | OUTPATIENT
Start: 2022-08-29 | End: 2023-07-25

## 2022-08-29 RX ORDER — LOSARTAN POTASSIUM 50 MG/1
50 TABLET ORAL 2 TIMES DAILY
Qty: 180 TABLET | Refills: 3 | Status: SHIPPED | OUTPATIENT
Start: 2022-08-29 | End: 2023-02-06

## 2022-08-29 RX ORDER — PRAVASTATIN SODIUM 20 MG
20 TABLET ORAL DAILY
Qty: 90 TABLET | Refills: 3 | Status: SHIPPED | OUTPATIENT
Start: 2022-08-29 | End: 2023-02-06

## 2022-08-29 RX ORDER — CITALOPRAM HYDROBROMIDE 10 MG/1
10 TABLET ORAL DAILY
Qty: 90 TABLET | Refills: 3 | Status: SHIPPED | OUTPATIENT
Start: 2022-08-29 | End: 2022-11-14

## 2022-08-29 ASSESSMENT — ENCOUNTER SYMPTOMS
BREAST MASS: 0
MYALGIAS: 1
ARTHRALGIAS: 1
JOINT SWELLING: 1

## 2022-08-29 ASSESSMENT — ACTIVITIES OF DAILY LIVING (ADL): CURRENT_FUNCTION: NO ASSISTANCE NEEDED

## 2022-08-29 ASSESSMENT — PAIN SCALES - GENERAL: PAINLEVEL: NO PAIN (0)

## 2022-08-29 NOTE — PATIENT INSTRUCTIONS
You will be due for mammogram after 9/9/2022    Please call the following number to make the appointment for mammogram and bone density.:  [phone]  It is located in [address]      Monitor your blood pressure once a week  at home.  Bring those readings on your next visit.  Notify us if your blood pressure readings consistently stays greater than 140/90.    You are due for mammogram and bone density  Please call the following number to make appointment :  [phone]  It is located in [address]    Having high blood pressure puts you at risk for heart attack, stroke, kidney damage, and other serious problems.   High blood pressure doesn't usually cause symptoms, so people sometimes don't take it seriously  The medicines your doctor or nurse prescribes to treat high blood pressure can help reduce the risk of these problems and even help you live longer.    You have a lot of control over your blood pressure. To lower it:  ?Lose weight (if you are overweight)  ?Choose a diet low in fat and rich in fruits, vegetables, and low-fat dairy products  ?Reduce the amount of salt you eat  ?Do something active for at least 30 minutes a day on most days of the week  ?Cut down on alcohol (if you drink more than 2 alcoholic drinks per day)      Obesity is a disease associated with a significant increase in mortality and many health risks, including type 2 diabetes mellitus, hypertension, dyslipidemia, and coronary heart disease. The higher the body mass index (BMI), the greater the risk of morbidity and mortality .    Benefits of regular physical activity  Reduces the risk of dying prematurely.  Reduces the risk of dying from heart disease.  Reduces the risk of stroke.  Reduces the risk of developing diabetes.  Reduces the risk of developing high blood pressure.  Helps reduce blood pressure in people who already have high blood pressure.  Reduces the risk of developing colon cancer.  Reduces feelings of depression and  anxiety  Helps control weight.  Helps build and maintain healthy bones, muscles and joints.  Helps older adults become stronger and better able to move about without falling.  Promotes psychological well-being.    Follow up in one year  Seek sooner medical attention if there is any worsening of symptoms or problems.              Patient Education   Personalized Prevention Plan  You are due for the preventive services outlined below.  Your care team is available to assist you in scheduling these services.  If you have already completed any of these items, please share that information with your care team to update in your medical record.  Health Maintenance Due   Topic Date Due    Urine Test  Never done    ANNUAL REVIEW OF HM ORDERS  06/28/2022    Basic Metabolic Panel  07/19/2022    Cholesterol Lab  07/19/2022    Kidney Microalbumin Urine Test  07/19/2022    Flu Vaccine (1) 09/01/2022    Mammogram  09/09/2022     Preventive Health Recommendations    See your health care provider every year to  Review health changes.   Discuss preventive care.    Review your medicines if your doctor has prescribed any.  You no longer need a yearly Pap test unless you've had an abnormal Pap test in the past 10 years. If you have vaginal symptoms, such as bleeding or discharge, be sure to talk with your provider about a Pap test.  Every 1 to 2 years, have a mammogram.  If you are over 69, talk with your health care provider about whether or not you want to continue having screening mammograms.  Every 10 years, have a colonoscopy. Or, have a yearly FIT test (stool test). These exams will check for colon cancer.   Have a cholesterol test every 5 years, or more often if your doctor advises it.   Have a diabetes test (fasting glucose) every three years. If you are at risk for diabetes, you should have this test more often.   At age 65, have a bone density scan (DEXA) to check for osteoporosis (brittle bone disease).    Shots:  Get a flu  shot each year.  Get a tetanus shot every 10 years.  Talk to your doctor about your pneumonia vaccines. There are now two you should receive - Pneumovax (PPSV 23) and Prevnar (PCV 13).  Talk to your pharmacist about the shingles vaccine.  Talk to your doctor about the hepatitis B vaccine.    Nutrition:   Eat at least 5 servings of fruits and vegetables each day.  Eat whole-grain bread, whole-wheat pasta and brown rice instead of white grains and rice.  Get adequate Calcium and Vitamin D.     Lifestyle  Exercise at least 150 minutes a week (30 minutes a day, 5 days a week). This will help you control your weight and prevent disease.  Limit alcohol to one drink per day.  No smoking.   Wear sunscreen to prevent skin cancer.   See your dentist twice a year for an exam and cleaning.  See your eye doctor every 1 to 2 years to screen for conditions such as glaucoma, macular degeneration and cataracts.    Personalized Prevention Plan  You are due for the preventive services outlined below.  Your care team is available to assist you in scheduling these services.  If you have already completed any of these items, please share that information with your care team to update in your medical record.  Health Maintenance   Topic Date Due    URINALYSIS  Never done    ANNUAL REVIEW OF HM ORDERS  06/28/2022    BMP  07/19/2022    LIPID  07/19/2022    MICROALBUMIN  07/19/2022    INFLUENZA VACCINE (1) 09/01/2022    MAMMO SCREENING  09/09/2022    TSH W/FREE T4 REFLEX  01/20/2023    HEMOGLOBIN  01/20/2023    COLORECTAL CANCER SCREENING  03/27/2023    MEDICARE ANNUAL WELLNESS VISIT  08/29/2023    FALL RISK ASSESSMENT  08/29/2023    DTAP/TDAP/TD IMMUNIZATION (3 - Td or Tdap) 07/24/2027    ADVANCE CARE PLANNING  08/29/2027    DEXA  03/06/2030    HEPATITIS C SCREENING  Completed    PHQ-2 (once per calendar year)  Completed    Pneumococcal Vaccine: 65+ Years  Completed    ZOSTER IMMUNIZATION  Completed    COVID-19 Vaccine  Completed    IPV  IMMUNIZATION  Aged Out    MENINGITIS IMMUNIZATION  Aged Out    HEPATITIS B IMMUNIZATION  Aged Out

## 2022-08-30 LAB
ALBUMIN SERPL-MCNC: 4 G/DL (ref 3.4–5)
ALP SERPL-CCNC: 55 U/L (ref 40–150)
ALT SERPL W P-5'-P-CCNC: 27 U/L (ref 0–50)
ANION GAP SERPL CALCULATED.3IONS-SCNC: 6 MMOL/L (ref 3–14)
AST SERPL W P-5'-P-CCNC: 21 U/L (ref 0–45)
BILIRUB SERPL-MCNC: 0.8 MG/DL (ref 0.2–1.3)
BUN SERPL-MCNC: 13 MG/DL (ref 7–30)
CALCIUM SERPL-MCNC: 9.4 MG/DL (ref 8.5–10.1)
CHLORIDE BLD-SCNC: 101 MMOL/L (ref 94–109)
CHOLEST SERPL-MCNC: 207 MG/DL
CO2 SERPL-SCNC: 27 MMOL/L (ref 20–32)
CREAT SERPL-MCNC: 1.04 MG/DL (ref 0.52–1.04)
FASTING STATUS PATIENT QL REPORTED: YES
GFR SERPL CREATININE-BSD FRML MDRD: 58 ML/MIN/1.73M2
GLUCOSE BLD-MCNC: 99 MG/DL (ref 70–99)
HDLC SERPL-MCNC: 79 MG/DL
LDLC SERPL CALC-MCNC: 105 MG/DL
NONHDLC SERPL-MCNC: 128 MG/DL
PHOSPHATE SERPL-MCNC: 3.5 MG/DL (ref 2.5–4.5)
POTASSIUM BLD-SCNC: 4.2 MMOL/L (ref 3.4–5.3)
PROT SERPL-MCNC: 6.9 G/DL (ref 6.8–8.8)
SODIUM SERPL-SCNC: 134 MMOL/L (ref 133–144)
TRIGL SERPL-MCNC: 114 MG/DL

## 2022-08-31 LAB
CREAT UR-MCNC: 31 MG/DL
MICROALBUMIN UR-MCNC: <5 MG/L
MICROALBUMIN/CREAT UR: NORMAL MG/G{CREAT}

## 2022-08-31 NOTE — RESULT ENCOUNTER NOTE
Jessica Miller,    This is to inform you regarding your test result.    Urine for Microalbumin is normal.        Sincerely,      Dr.Nasima Yimi MD,FACP

## 2022-08-31 NOTE — RESULT ENCOUNTER NOTE
Jessica Miller,    This is to inform you regarding your test result.    Your total cholesterol is elevated.  HDL which is called good cholesterol is normal.  Your LDL which is called bad cholesterol is elevated.  Eat low cholesterol low fat  diet and do regular physical activity.  The testing of your kidney function, liver function and electrolytes was satisfactory   Phosphorus is normal  Urine test is negative for any infection      Sincerely,      Dr.Nasima Yimi MD,FACP

## 2022-09-14 ENCOUNTER — TRANSFERRED RECORDS (OUTPATIENT)
Dept: HEALTH INFORMATION MANAGEMENT | Facility: CLINIC | Age: 72
End: 2022-09-14

## 2022-09-22 ENCOUNTER — HOSPITAL ENCOUNTER (OUTPATIENT)
Dept: MAMMOGRAPHY | Facility: CLINIC | Age: 72
Discharge: HOME | End: 2022-09-22
Attending: INTERNAL MEDICINE
Payer: COMMERCIAL

## 2022-09-22 ENCOUNTER — HOSPITAL ENCOUNTER (OUTPATIENT)
Dept: BONE DENSITY | Facility: CLINIC | Age: 72
Discharge: HOME | End: 2022-09-22
Attending: INTERNAL MEDICINE
Payer: COMMERCIAL

## 2022-09-22 DIAGNOSIS — Z12.31 VISIT FOR SCREENING MAMMOGRAM: ICD-10-CM

## 2022-09-22 DIAGNOSIS — Z78.0 ASYMPTOMATIC POSTMENOPAUSAL STATUS: ICD-10-CM

## 2022-09-22 PROCEDURE — 77080 DXA BONE DENSITY AXIAL: CPT

## 2022-09-22 PROCEDURE — 77067 SCR MAMMO BI INCL CAD: CPT

## 2022-09-23 NOTE — RESULT ENCOUNTER NOTE
Jessica Miller,    This is to inform you regarding your test result.    Your bone density result is normal  Advise to take adequate calcium and vit D and weight bearing exercises      Sincerely,      Dr.Nasima Yimi MD,FACP

## 2022-10-08 NOTE — PROGRESS NOTES
"SUBJECTIVE:   Angela Story is a 72 year old female who presents for Preventive Visit.      Patient has been advised of split billing requirements and indicates understanding: Yes  Are you in the first 12 months of your Medicare coverage?  No    Healthy Habits:     In general, how would you rate your overall health?  Good    Frequency of exercise:  4-5 days/week    Duration of exercise:  45-60 minutes    Do you usually eat at least 4 servings of fruit and vegetables a day, include whole grains    & fiber and avoid regularly eating high fat or \"junk\" foods?  Yes    Taking medications regularly:  Yes    Medication side effects:  Muscle aches    Ability to successfully perform activities of daily living:  No assistance needed    Home Safety:  No safety concerns identified    Hearing Impairment:  No hearing concerns    In the past 6 months, have you been bothered by leaking of urine?  No    In general, how would you rate your overall mental or emotional health?  Excellent      PHQ-2 Total Score: 0    Additional concerns today:  Yes    Do you feel safe in your environment? Yes    Have you ever done Advance Care Planning? (For example, a Health Directive, POLST, or a discussion with a medical provider or your loved ones about your wishes): Yes, advance care planning is on file.       Fall risk  Fallen 2 or more times in the past year?: No  Any fall with injury in the past year?: No    Cognitive Screening   1) Repeat 3 items (Leader, Season, Table)    2) Clock draw: NORMAL  3) 3 item recall: Recalls 3 objects  Results: 3 items recalled: COGNITIVE IMPAIRMENT LESS LIKELY    Mini-CogTM Copyright ROSARIO Guerrero. Licensed by the author for use in [facility]; reprinted with permission (fifi@.[facility]). All rights reserved.      Do you have sleep apnea, excessive snoring or daytime drowsiness?: no    Reviewed and updated as needed this visit by clinical staff   Tobacco  Allergies  Meds                Reviewed and updated " "as needed this visit by Provider   Tobacco   Meds               Social History     Tobacco Use     Smoking status: Never Smoker     Smokeless tobacco: Never Used   Substance Use Topics     Alcohol use: Yes     Comment: 1 per week     If you drink alcohol do you typically have >3 drinks per day or >7 drinks per week? No    Alcohol Use 8/29/2022   Prescreen: >3 drinks/day or >7 drinks/week? -   Prescreen: >3 drinks/day or >7 drinks/week? No               Current providers sharing in care for this patient include:   Patient Care Team:  Merary Geller MD as PCP - General (Internal Medicine)  Satish Scott as Other (see comments) (Therapist)  Raphael Aguero MD as MD (Ophthalmology)  Merary Geller MD as Assigned PCP  Gregor Harris MD as Assigned Surgical Provider    The following health maintenance items are reviewed in Epic and correct as of today:  Health Maintenance Due   Topic Date Due     BMP  07/19/2022     LIPID  07/19/2022     MICROALBUMIN  07/19/2022     INFLUENZA VACCINE (1) 09/01/2022     MAMMO SCREENING  09/09/2022             Review of Systems   Breasts:  Negative for tenderness, breast mass and discharge.   Genitourinary: Negative for pelvic pain, vaginal bleeding and vaginal discharge.   Musculoskeletal: Positive for arthralgias, joint swelling and myalgias.     Blood Pressure is good at home  Had cortisone in both knees   She follows orthopedics   OBJECTIVE:   BP (!) 140/82   Pulse 64   Temp 96.9  F (36.1  C) (Temporal)   Ht 1.543 m (5' 0.75\")   Wt 85.7 kg (189 lb)   LMP 07/23/2004   SpO2 98%   BMI 36.01 kg/m   Estimated body mass index is 36.01 kg/m  as calculated from the following:    Height as of this encounter: 1.543 m (5' 0.75\").    Weight as of this encounter: 85.7 kg (189 lb).  Physical Exam  GENERAL: healthy, alert and no distress  EYES: Eyes grossly normal to inspection, PERRL and conjunctivae and sclerae normal  HENT: ear canals and TM's normal, nose and mouth " without ulcers or lesions  Some cerumen on left side  NECK: no adenopathy,   RESP: lungs clear to auscultation - no rales, rhonchi or wheezes  BREAST: normal without masses, tenderness or nipple discharge and no palpable axillary masses or adenopathy  Mild erythema under breast due to fungal infection  CV: regular rate and rhythm, normal S1 S2, no S3   ABDOMEN: soft, nontender, no hepatosplenomegaly, no masses and bowel sounds normal  MS: changes due to arthritis he has slight edema of both LE  SKIN: no suspicious lesions or rashes  Numerous moles and freckles   She has seen Kareen Bell  NEURO: , mentation intact and speech normal  PSYCH: mentation appears normal, affect normal/bright  Prominent veins and spider veins in both LE      ASSESSMENT / PLAN:   Angela was seen today for physical.    Diagnoses and all orders for this visit:    Encounter for Medicare annual wellness exam  Preventive health counseling was also done.  She had colonoscopy in 2018  Mammogram in September 2021    Visit for screening mammogram  She is due after September 9    Asymptomatic postmenopausal status  -     DX Hip/Pelvis/Spine; Future  She takes adequate calcium and vitamin D  Previous port density in 2015 was normal    Generalized anxiety disorder  -     citalopram (CELEXA) 10 MG tablet; Take 1 tablet (10 mg) by mouth daily  Per patient report this  is working for her    Stage 3a chronic kidney disease (H)  -     Renal panel (Alb, BUN, Ca, Cl, CO2, Creat, Gluc, Phos, K, Na); Future  -     UA with Microscopic reflex to Culture - lab collect; Future  -     Albumin Random Urine Quantitative with Creat Ratio; Future  -     Cancel: Renal panel (Alb, BUN, Ca, Cl, CO2, Creat, Gluc, Phos, K, Na)  -     UA with Microscopic reflex to Culture - lab collect  -     Albumin Random Urine Quantitative with Creat Ratio  -     Phosphorus  -     Urine Microscopic    Benign hypertension  -     losartan (COZAAR) 50 MG tablet; Take 1 tablet (50 mg) by  mouth 2 times daily for Blood Pressure  -     Comprehensive metabolic panel (BMP + Alb, Alk Phos, ALT, AST, Total. Bili, TP); Future  -     Comprehensive metabolic panel (BMP + Alb, Alk Phos, ALT, AST, Total. Bili, TP)  Per patient home blood pressure readings are good  She will continue to monitor and let me know if it stays above 140/90  Will adjust the medications if needed  Discussed the importance of taking good care of blood pressure.   high blood pressure is a risk factor for heart attack and stroke        Hyperlipidemia, unspecified hyperlipidemia type  -     Lipid panel reflex to direct LDL Non-fasting; Future  -     pravastatin (PRAVACHOL) 20 MG tablet; Take 1 tablet (20 mg) by mouth daily for cholestrol  -     Lipid panel reflex to direct LDL Non-fasting    Hypothyroidism due to Hashimoto's thyroiditis  -     levothyroxine (SYNTHROID/LEVOTHROID) 25 MCG tablet; Take 1.5 tablets (37.5 mcg) by mouth daily  She was seen by endocrinologist Dr. Song  TSH was done by them  She requested me to renew her prescription as she is going to run out    Anti-TPO antibodies present  -     levothyroxine (SYNTHROID/LEVOTHROID) 25 MCG tablet; Take 1.5 tablets (37.5 mcg) by mouth daily    Cough due to acid reflux  -     omeprazole (PRILOSEC) 20 MG DR capsule; Take 1 capsule (20 mg) by mouth daily as needed    History of COVID-19  Past history    Morbid obesity (H)  Discussed healthy diet and exercise  Discussed medication for weight management  She will make an appointment if she decides to take dose    Other orders  -     REVIEW OF HEALTH MAINTENANCE PROTOCOL ORDERS    Patient requested refill on nystatin  She uses that for rash under her breast  It works well for her    Disclaimer: This note consists of symbols derived from keyboarding, dictation and/or voice recognition software. As a result, there may be errors in the script that have gone undetected. Please consider this when interpreting information found in this  "chart.      Patient has been advised of split billing requirements and indicates understanding: Yes    COUNSELING:  Reviewed preventive health counseling, as reflected in patient instructions       Regular exercise       Healthy diet/nutrition       Osteoporosis prevention/bone health    Estimated body mass index is 36.01 kg/m  as calculated from the following:    Height as of this encounter: 1.543 m (5' 0.75\").    Weight as of this encounter: 85.7 kg (189 lb).    Weight management plan: Discussed healthy diet and exercise guidelines    She reports that she has never smoked. She has never used smokeless tobacco.      Appropriate preventive services were discussed with this patient, including applicable screening as appropriate for cardiovascular disease, diabetes, osteopenia/osteoporosis, and glaucoma.  As appropriate for age/gender, discussed screening for colorectal cancer, prostate cancer, breast cancer, and cervical cancer. Checklist reviewing preventive services available has been given to the patient.    Reviewed patients plan of care and provided an AVS. The Basic Care Plan (routine screening as documented in Health Maintenance) for Angela meets the Care Plan requirement. This Care Plan has been established and reviewed with the Patient.    Counseling Resources:  ATP IV Guidelines  Pooled Cohorts Equation Calculator  Breast Cancer Risk Calculator  Breast Cancer: Medication to Reduce Risk  FRAX Risk Assessment  ICSI Preventive Guidelines  Dietary Guidelines for Americans, 2010  Workable's MyPlate  ASA Prophylaxis  Lung CA Screening    Merary Geller MD  [facility]    Identified Health Risks:  " Oriented - self; Oriented - place; Oriented - time/Age appropriate behavior

## 2022-10-25 ENCOUNTER — OFFICE VISIT (OUTPATIENT)
Dept: FAMILY MEDICINE | Facility: CLINIC | Age: 72
End: 2022-10-25
Payer: COMMERCIAL

## 2022-10-25 VITALS
BODY MASS INDEX: 36.44 KG/M2 | HEART RATE: 71 BPM | DIASTOLIC BLOOD PRESSURE: 70 MMHG | SYSTOLIC BLOOD PRESSURE: 125 MMHG | RESPIRATION RATE: 16 BRPM | HEIGHT: 61 IN | WEIGHT: 193 LBS | OXYGEN SATURATION: 96 % | TEMPERATURE: 97.7 F

## 2022-10-25 DIAGNOSIS — H93.90 EAR PROBLEM, UNSPECIFIED LATERALITY: Primary | ICD-10-CM

## 2022-10-25 PROCEDURE — 99212 OFFICE O/P EST SF 10 MIN: CPT | Performed by: PHYSICIAN ASSISTANT

## 2022-10-25 ASSESSMENT — PAIN SCALES - GENERAL: PAINLEVEL: NO PAIN (0)

## 2022-10-25 NOTE — PROGRESS NOTES
"  Assessment & Plan     Ear problem, unspecified laterality  It appears she successfully cleaned out bilateral ears at home.  Very trace amounts of wax remaining.  TMs intact and without signs of infection.  Follow-up if recurrence.      Return for Follow up.    The likelihood of other entities in the differential is insufficient to justify any further testing for them at this time. This was explained to the patient. The patient was advised that persistent or worsening symptoms would require further evaluation. Patient advised to call the office and if unable to reach to go to the emergency room if they develop any new or worsening symptoms. Expressed understanding and agreement with above stated plan.     KARYNA Capps [facility] CHRISTOPH Miller is a 72 year old presenting for the following health issues:  Ear Problem      HPI     Presents today for evaluation of cerumen infection.  At most recent annual office visit PCP informed her that she had earwax buildup in her left ear.  Wanted to be reevaluated today to assess her for at home application of drops and ear flushes help.  Notes no decreased hearing or sense of fullness.  Otherwise she feels well.  No additional concerns.  Recently completed COVID-19 and influenza immunizations, will update in the chart.    Review of Systems   Constitutional, HEENT, cardiovascular, pulmonary, GI, , musculoskeletal, neuro, skin, endocrine and psych systems are negative, except as otherwise noted.      Objective    /70   Pulse 71   Temp 97.7  F (36.5  C) (Temporal)   Resp 16   Ht 1.543 m (5' 0.75\")   Wt 87.5 kg (193 lb)   LMP 07/23/2004   SpO2 96%   BMI 36.77 kg/m    Body mass index is 36.77 kg/m .  Physical Exam   GENERAL: healthy, alert and no distress  EYES: Eyes grossly normal to inspection, PERRL and conjunctivae and sclerae normal  HENT: ear canals and TM's normal, nose and mouth without ulcers or lesions  NECK: no " adenopathy, no asymmetry, masses, or scars and thyroid normal to palpation  RESP: lungs clear to auscultation - no rales, rhonchi or wheezes  CV: regular rate and rhythm, normal S1 S2, no S3 or S4, no murmur, click or rub, no peripheral edema and peripheral pulses strong  MS: no gross musculoskeletal defects noted, no edema  SKIN: no suspicious lesions or rashes  NEURO: Normal strength and tone, mentation intact and speech normal  PSYCH: mentation appears normal, affect normal/bright

## 2022-11-14 ENCOUNTER — OFFICE VISIT (OUTPATIENT)
Dept: FAMILY MEDICINE | Facility: CLINIC | Age: 72
End: 2022-11-14
Payer: COMMERCIAL

## 2022-11-14 VITALS
HEART RATE: 83 BPM | SYSTOLIC BLOOD PRESSURE: 138 MMHG | DIASTOLIC BLOOD PRESSURE: 78 MMHG | WEIGHT: 191.4 LBS | TEMPERATURE: 97.4 F | BODY MASS INDEX: 36.14 KG/M2 | RESPIRATION RATE: 16 BRPM | OXYGEN SATURATION: 98 % | HEIGHT: 61 IN

## 2022-11-14 DIAGNOSIS — G89.29 CHRONIC PAIN OF RIGHT KNEE: ICD-10-CM

## 2022-11-14 DIAGNOSIS — F41.1 GENERALIZED ANXIETY DISORDER: ICD-10-CM

## 2022-11-14 DIAGNOSIS — J06.9 UPPER RESPIRATORY TRACT INFECTION, UNSPECIFIED TYPE: ICD-10-CM

## 2022-11-14 DIAGNOSIS — E66.01 MORBID OBESITY (H): ICD-10-CM

## 2022-11-14 DIAGNOSIS — Z01.818 PREOP GENERAL PHYSICAL EXAM: Primary | ICD-10-CM

## 2022-11-14 DIAGNOSIS — M25.561 CHRONIC PAIN OF RIGHT KNEE: ICD-10-CM

## 2022-11-14 DIAGNOSIS — Z13.0 SCREENING FOR DEFICIENCY ANEMIA: ICD-10-CM

## 2022-11-14 DIAGNOSIS — N18.31 STAGE 3A CHRONIC KIDNEY DISEASE (H): ICD-10-CM

## 2022-11-14 DIAGNOSIS — Z79.899 MEDICATION MANAGEMENT: ICD-10-CM

## 2022-11-14 LAB
ERYTHROCYTE [DISTWIDTH] IN BLOOD BY AUTOMATED COUNT: 11.9 % (ref 10–15)
FERRITIN SERPL-MCNC: 185 NG/ML (ref 8–252)
HCT VFR BLD AUTO: 43 % (ref 35–47)
HGB BLD-MCNC: 14.2 G/DL (ref 11.7–15.7)
MCH RBC QN AUTO: 30.7 PG (ref 26.5–33)
MCHC RBC AUTO-ENTMCNC: 33 G/DL (ref 31.5–36.5)
MCV RBC AUTO: 93 FL (ref 78–100)
PLATELET # BLD AUTO: 220 10E3/UL (ref 150–450)
RBC # BLD AUTO: 4.62 10E6/UL (ref 3.8–5.2)
WBC # BLD AUTO: 6.1 10E3/UL (ref 4–11)

## 2022-11-14 PROCEDURE — 80048 BASIC METABOLIC PNL TOTAL CA: CPT | Performed by: INTERNAL MEDICINE

## 2022-11-14 PROCEDURE — 93000 ELECTROCARDIOGRAM COMPLETE: CPT | Performed by: INTERNAL MEDICINE

## 2022-11-14 PROCEDURE — 85027 COMPLETE CBC AUTOMATED: CPT | Performed by: INTERNAL MEDICINE

## 2022-11-14 PROCEDURE — 82728 ASSAY OF FERRITIN: CPT | Performed by: INTERNAL MEDICINE

## 2022-11-14 PROCEDURE — 36415 COLL VENOUS BLD VENIPUNCTURE: CPT | Performed by: INTERNAL MEDICINE

## 2022-11-14 PROCEDURE — 99214 OFFICE O/P EST MOD 30 MIN: CPT | Performed by: INTERNAL MEDICINE

## 2022-11-14 RX ORDER — CITALOPRAM HYDROBROMIDE 20 MG/1
20 TABLET ORAL DAILY
Qty: 90 TABLET | Refills: 1 | Status: SHIPPED | OUTPATIENT
Start: 2022-11-14 | End: 2023-02-06

## 2022-11-14 ASSESSMENT — PAIN SCALES - GENERAL: PAINLEVEL: NO PAIN (0)

## 2022-11-14 NOTE — PROGRESS NOTES
[address], [address]  [facility] [address]  Phone: [phone]  Primary Provider: Merary Munson  Pre-op Performing Provider: MERARY MUNSON      PREOPERATIVE EVALUATION:  Today's date: 11/14/2022    Angela Story is a 72 year old female who presents for a preoperative evaluation.    Surgical Information:  Surgery/Procedure: Knee replacement on right side   Surgery Location: [facility]  Surgeon: Dr. Cid  Surgery Date: 12/02/22  Time of Surgery: TBD  Where patient plans to recover: At home with family  Fax number for surgical facility: [phone]    Type of Anesthesia Anticipated: General    Assessment & Plan     The proposed surgical procedure is considered INTERMEDIATE risk.    Angela was seen today for pre-op exam.    Diagnoses and all orders for this visit:    Preop general physical exam  -     CBC with platelets; Future  -     EKG 12-lead complete w/read - Clinics    Chronic pain of right knee  Patient has pain in both knee joint right is worse than the left  She is going to have right knee replacement surgery first then left knee in future    CKD (chronic kidney disease) stage 3, GFR 30-59 ml/min (H)  Chronic and is stable    Morbid obesity (H)  She is on healthy diet and regular physical activity    Generalized anxiety disorder  -     citalopram (CELEXA) 20 MG tablet; Take 1 tablet (20 mg) by mouth daily  She wants to increase the dose of Celexa from 10 mg to 20  That worked better for her and kept her blood pressure under good control  Her anxiety was under much better control with higher dose  She would let me know how that works for her    Screening for deficiency anemia  -     Ferritin; Future    Upper respiratory tract infection, unspecified type    Per patient she was seen in urgent care due to sinus infection  She is taking antibiotic  She is improving slowly but he still has cough  She has plenty of time for her surgery  I  told her to make sure her symptoms are resolved by that time  If symptoms get worse then seek medical attention  Stay well-hydrated  Finish antibiotic as prescribed  Per patient she tested negative for COVID           Risks and Recommendations:  The patient has the following additional risks and recommendations for perioperative complications:   - No identified additional risk factors other than previously addressed    Medication Instructions:  Patient is to take all scheduled medications on the day of surgery EXCEPT for modifications listed below:  Avoid aspirin 7 days before the surgery. Avoid nonsteroidal anti-inflammatory pain medication like ibuprofen, Motrin, or Aleve 7 days before the surgery.  Tylenol is okay to use for pain.  Avoid any OTC multivitamins or herbal supplement 7 days before surgery   Resume after surgery  Do not take losartan on morning of surgery    RECOMMENDATION:  Patient is optimal for above procedure.                      Subjective     HPI related to upcoming procedure: right knee   Patient had knee pain for long time right is worse than left   She had knee surgery 15 years ago for meniscal tear   Preop Questions 11/13/2022   1. Have you ever had a heart attack or stroke? No   2. Have you ever had surgery on your heart or blood vessels, such as a stent placement, a coronary artery bypass, or surgery on an artery in your head, neck, heart, or legs? No   3. Do you have chest pain with activity? No   4. Do you have a history of  heart failure? No   5. Do you currently have a cold, bronchitis or symptoms of other infection? YES - improving was seen in UC    6. Do you have a cough, shortness of breath, or wheezing? YES recovering from sinusitis    7. Do you or anyone in your family have previous history of blood clots? No   8. Do you or does anyone in your family have a serious bleeding problem such as prolonged bleeding following surgeries or cuts? No   9. Have you ever had problems with  anemia or been told to take iron pills? No   10. Have you had any abnormal blood loss such as black, tarry or bloody stools, or abnormal vaginal bleeding? No   11. Have you ever had a blood transfusion? No   12. Are you willing to have a blood transfusion if it is medically needed before, during, or after your surgery? Yes   13. Have you or any of your relatives ever had problems with anesthesia? YES - sister had some issues but nothing serious    14. Do you have sleep apnea, excessive snoring or daytime drowsiness? No   15. Do you have any artifical heart valves or other implanted medical devices like a pacemaker, defibrillator, or continuous glucose monitor? No   16. Do you have artificial joints? No   17. Are you allergic to latex? No       Health Care Directive:  Patient has a Health Care Directive on file      Preoperative Review of :   reviewed - no record of controlled substances prescribed.      Status of Chronic Conditions:  See problem list for active medical problems.  Problems all longstanding and stable, except as noted/documented.  See ROS for pertinent symptoms related to these conditions.      Review of Systems  CONSTITUTIONAL: NEGATIVE for fever, chills, change in weight  INTEGUMENTARY/SKIN: NEGATIVE for worrisome rashes, moles or lesions  EYES: NEGATIVE for vision changes or irritation  ENT/MOUTH: NEGATIVE for ear, mouth and throat problems  RESP: NEGATIVE for significant cough or SOB  CV: NEGATIVE for chest pain, palpitations or peripheral edema  GI: NEGATIVE for nausea, abdominal pain, heartburn, or change in bowel habits  : NEGATIVE for frequency, dysuria, or hematuria  MUSCULOSKELETAL: NEGATIVE for significant arthralgias or myalgia  NEURO: NEGATIVE for weakness, dizziness or paresthesias  ENDOCRINE: NEGATIVE for temperature intolerance, skin/hair changes  HEME: NEGATIVE for bleeding problems  PSYCHIATRIC: NEGATIVE for changes in mood or affect    Patient Active Problem List     Diagnosis Date Noted     Morbid obesity (H) 08/29/2022     Priority: Medium     Dermatochalasis of both upper eyelids 05/17/2021     Priority: Medium     Added automatically from request for surgery [id number]       Ptosis of both upper eyelids 05/17/2021     Priority: Medium     Added automatically from request for surgery [id number]       Hypothyroidism due to Hashimoto's thyroiditis 07/17/2020     Priority: Medium     Hypothyroidism, unspecified type 06/25/2019     Priority: Medium     Anti-TPO antibodies present 04/23/2019     Priority: Medium     Elevated TSH 01/23/2019     Priority: Medium     Non morbid obesity, unspecified obesity type 01/03/2017     Priority: Medium     IFG (impaired fasting glucose) 09/24/2016     Priority: Medium     Thyroid nodule 12/31/2015     Priority: Medium     Generalized anxiety disorder 05/19/2014     Priority: Medium     Bilateral bunions 05/12/2014     Priority: Medium     Right shoulder pain 04/22/2014     Priority: Medium     Osteoarthritis of acromioclavicular joint 04/22/2014     Priority: Medium     Esophageal reflux 04/21/2014     Priority: Medium     Benign hypertension 03/07/2013     Priority: Medium     Advanced directives, counseling/discussion 12/20/2011     Priority: Medium     Advance Care Planning:   Receipt of ACP document:  Received: Health Care Directive which was witnessed or notarized on 7/5/2013.  Document not previously scanned.  Validation form completed and sent with document to be scanned.  .  Confirmed/documented designated decision maker(s). See permanent comments section of demographics in clinical tab. View document(s) and details by clicking on code status.   Added by Ernestine Estevez on 7/18/2013.             CKD (chronic kidney disease) stage 3, GFR 30-59 ml/min (H)      Priority: Medium     HYPERLIPIDEMIA LDL GOAL <100 05/09/2010     Priority: Medium      Past Medical History:   Diagnosis Date     Bilateral bunions 5/12/2014     CKD (chronic kidney  disease) stage 3, GFR 30-59 ml/min (H)      Gastro-oesophageal reflux disease      Hyperlipidemia LDL goal <100 2010     Hypertension goal BP (blood pressure) < 140/90 3/7/2013     IFG (impaired fasting glucose) 2016     Osteoarthritis of acromioclavicular joint      PONV (postoperative nausea and vomiting)      Thyroid nodule 2015     Past Surgical History:   Procedure Laterality Date     BIOPSY      Breast lump     BREAST SURGERY      See above     BUNIONECTOMY  2014    Procedure: BUNIONECTOMY;  Surgeon: Kevin Rodas DPM;  Location: [facility] OR     COLONOSCOPY  ;      COMBINED REPAIR PTOSIS WITH BLEPHAROPLASTY Bilateral 2021    Procedure: Bilateral upper eyelid blepharoplasty wtih ptosis repair;  Surgeon: Gregor Harris MD;  Location: [facility] OR     EXCISE MASS BACK  2014    Procedure: EXCISE MASS BACK;  Surgeon: Chaim Lacy MD;  Location: [facility] OR     HC DILATION/CURETTAGE DIAG/THER NON OB      D & C     surgery for ptosis of both eyelids       TONSILLECTOMY  1966    tonsillectomy     ZZC  DELIVERY ONLY      , Low Cervical     ZZ NONSPECIFIC PROCEDURE  1986    lumpectomy, lt     ZZC NONSPECIFIC PROCEDURE      ob      ZZC NONSPECIFIC PROCEDURE  1983    Vaginal delivery x 1     ZZ ARTHROTOMY W/OPEN MENISCUS REPAIR      lt knee     Current Outpatient Medications   Medication Sig Dispense Refill     Blood Pressure Monitoring KIT 1 kit daily 1 kit 0     cholecalciferol 25 MCG (1000 UT) TABS Take 1,000 Units by mouth       citalopram (CELEXA) 20 MG tablet Take 1 tablet (20 mg) by mouth daily 90 tablet 1     levothyroxine (SYNTHROID/LEVOTHROID) 25 MCG tablet Take 1.5 tablets (37.5 mcg) by mouth daily 135 tablet 3     losartan (COZAAR) 50 MG tablet Take 1 tablet (50 mg) by mouth 2 times daily for Blood Pressure 180 tablet 3     nystatin (MYCOSTATIN) 268520 UNIT/GM external powder Apply topically 2 times daily 60 g 1     omeprazole (PRILOSEC) 20  "MG DR capsule Take 1 capsule (20 mg) by mouth daily as needed 90 capsule 3     pravastatin (PRAVACHOL) 20 MG tablet Take 1 tablet (20 mg) by mouth daily for cholestrol 90 tablet 3     scopolamine (TRANSDERM) 72 hr patch Apply 1 patch to hairless area behind one ear at least 4 hours before travel.  Remove old patch and change every 3 days (72 hours). (Patient taking differently: Place 1 patch onto the skin as needed Apply 1 patch to hairless area behind one ear at least 4 hours before travel.  Remove old patch and change every 3 days (72 hours).) 4 patch 11     Selenium 200 MCG TABS tablet Take 200 mcg by mouth daily       vitamin D3 (CHOLECALCIFEROL) 50 mcg (2000 units) tablet Take 1 tablet (50 mcg) by mouth daily         Allergies   Allergen Reactions     Norco [Hydrocodone-Acetaminophen] Dizziness and Nausea and Vomiting     Eggs Nausea and Vomiting and Diarrhea     Guaifenesin Hives     Lisinopril Cough     Mucinex Hives     Penicillins Rash     Sulfa Drugs Hives     Sulfites Nausea and Vomiting and Diarrhea     Sulfametrole Rash        Social History     Tobacco Use     Smoking status: Never     Smokeless tobacco: Never   Substance Use Topics     Alcohol use: Yes     Comment: 1 per week     No history of any anesthesia complications   No family history of any anesthesia complications.    History   Drug Use Unknown       requesting to increase citalopram as higher dose worked better     Objective     /78   Pulse 83   Temp 97.4  F (36.3  C)   Resp 16   Ht 1.543 m (5' 0.75\")   Wt 86.8 kg (191 lb 6.4 oz)   LMP 07/23/2004   SpO2 98%   BMI 36.46 kg/m      Physical Exam    GENERAL APPEARANCE: healthy, alert and no distress     EYES: EOMI, PERRL     HENT: ear canals and TM's normal and nose and mouth without ulcers or lesions     NECK: no adenopathy, no asymmetry, masses, or scars and thyroid normal to palpation     RESP: lungs clear to auscultation - no rales, rhonchi or wheezes     CV: regular rates and " rhythm, normal S1 S2, no S3 or S4 and no murmur, click or rub     ABDOMEN:  soft, nontender, no HSM or masses and bowel sounds normal     MS:  no evidence of inflammation in joints, FROM in all extremities.     SKIN: no suspicious lesions or rashes     NEURO: Normal strength and tone, sensory exam grossly normal, mentation intact and speech normal     PSYCH: mentation appears normal. and affect normal/bright     LYMPHATICS: No cervical adenopathy    Recent Labs   Lab Test 08/29/22  1351 01/20/22  1116 07/19/21  0839 02/25/21  1037 01/18/21  0921   HGB  --  14.2  --   --  14.5   PLT  --  187  --   --  179     --  140   < > 141   POTASSIUM 4.2  --  4.5   < > 4.6   CR 1.04  --  1.13*   < > 1.10*   A1C  --   --   --   --  5.8*    < > = values in this interval not displayed.        Diagnostics:  Recent Results (from the past 24 hour(s))   CBC with platelets    Collection Time: 11/14/22  8:09 AM   Result Value Ref Range    WBC Count 6.1 4.0 - 11.0 10e3/uL    RBC Count 4.62 3.80 - 5.20 10e6/uL    Hemoglobin 14.2 11.7 - 15.7 g/dL    Hematocrit 43.0 35.0 - 47.0 %    MCV 93 78 - 100 fL    MCH 30.7 26.5 - 33.0 pg    MCHC 33.0 31.5 - 36.5 g/dL    RDW 11.9 10.0 - 15.0 %    Platelet Count 220 150 - 450 10e3/uL   Ferritin    Collection Time: 11/14/22  8:09 AM   Result Value Ref Range    Ferritin 185 8 - 252 ng/mL      EKG: appears normal, NSR, normal axis, normal intervals, no acute ST/T changes c/w ischemia, no LVH by voltage criteria    Revised Cardiac Risk Index (RCRI):  The patient has the following serious cardiovascular risks for perioperative complications:   - No serious cardiac risks = 0 points     RCRI Interpretation: 1 point: Class II (low risk - 0.9% complication rate)           Signed Electronically by: Merary Geller MD  Copy of this evaluation report is provided to requesting physician.

## 2022-11-14 NOTE — PATIENT INSTRUCTIONS
Avoid aspirin 7 days before the surgery. Avoid nonsteroidal anti-inflammatory pain medication like ibuprofen, Motrin, or Aleve 7 days before the surgery.  Tylenol is okay to use for pain.  Avoid any OTC multivitamins or herbal supplement 7 days before surgery   Resume after surgery   Hold losartan on morning of surgery      The dose of Celexa is increased to 20 mg dialy      Monitor your blood pressure once a week  at home.  Bring those readings on your next visit.  Notify us if your blood pressure readings consistently stays greater than 140/90.     Having high blood pressure puts you at risk for heart attack, stroke, kidney damage, and other serious problems.   High blood pressure doesn't usually cause symptoms, so people sometimes don't take it seriously  The medicines your doctor or nurse prescribes to treat high blood pressure can help reduce the risk of these problems and even help you live longer.    You have a lot of control over your blood pressure. To lower it:  ?Lose weight (if you are overweight)  ?Choose a diet low in fat and rich in fruits, vegetables, and low-fat dairy products  ?Reduce the amount of salt you eat  ?Do something active for at least 30 minutes a day on most days of the week  ?Cut down on alcohol (if you drink more than 2 alcoholic drinks per day)

## 2022-11-15 NOTE — RESULT ENCOUNTER NOTE
Jessica Miller,    This is to inform you regarding your test result.    Ferritin which is iron stores in the body is normal.  CBC result which includes white count Hemoglobin and  Platelet Counts is normal.         Sincerely,      Dr.Nasima Yimi MD,FACP

## 2022-11-16 LAB
ANION GAP SERPL CALCULATED.3IONS-SCNC: 18 MMOL/L (ref 7–15)
BUN SERPL-MCNC: 14.7 MG/DL (ref 8–23)
CALCIUM SERPL-MCNC: 10.3 MG/DL (ref 8.8–10.2)
CHLORIDE SERPL-SCNC: 103 MMOL/L (ref 98–107)
CREAT SERPL-MCNC: 1 MG/DL (ref 0.51–0.95)
DEPRECATED HCO3 PLAS-SCNC: 20 MMOL/L (ref 22–29)
GFR SERPL CREATININE-BSD FRML MDRD: 60 ML/MIN/1.73M2
GLUCOSE SERPL-MCNC: 121 MG/DL (ref 70–99)
POTASSIUM SERPL-SCNC: 4.6 MMOL/L (ref 3.4–5.3)
SODIUM SERPL-SCNC: 141 MMOL/L (ref 136–145)

## 2022-11-17 DIAGNOSIS — E83.52 HYPERCALCEMIA: Primary | ICD-10-CM

## 2022-11-17 NOTE — RESULT ENCOUNTER NOTE
Jessica Miller,    This is to inform you regarding your test result.    I spoke to you on phone but sending you a result note also.  Your BMP showed some of the values out of range   please schedule lab appointment in 1 week  I would like to recheck again    Sincerely,      Dr.Nasima Yimi MD,FACP

## 2022-11-23 ENCOUNTER — LAB (OUTPATIENT)
Dept: LAB | Facility: CLINIC | Age: 72
End: 2022-11-23
Payer: COMMERCIAL

## 2022-11-23 DIAGNOSIS — E83.52 HYPERCALCEMIA: ICD-10-CM

## 2022-11-23 LAB
ANION GAP SERPL CALCULATED.3IONS-SCNC: 12 MMOL/L (ref 7–15)
BUN SERPL-MCNC: 14.9 MG/DL (ref 8–23)
CALCIUM SERPL-MCNC: 9.2 MG/DL (ref 8.8–10.2)
CHLORIDE SERPL-SCNC: 103 MMOL/L (ref 98–107)
CREAT SERPL-MCNC: 0.86 MG/DL (ref 0.51–0.95)
DEPRECATED HCO3 PLAS-SCNC: 23 MMOL/L (ref 22–29)
GFR SERPL CREATININE-BSD FRML MDRD: 72 ML/MIN/1.73M2
GLUCOSE SERPL-MCNC: 121 MG/DL (ref 70–99)
POTASSIUM SERPL-SCNC: 4 MMOL/L (ref 3.4–5.3)
PTH-INTACT SERPL-MCNC: 37 PG/ML (ref 15–65)
SODIUM SERPL-SCNC: 138 MMOL/L (ref 136–145)

## 2022-11-23 PROCEDURE — 80048 BASIC METABOLIC PNL TOTAL CA: CPT

## 2022-11-23 PROCEDURE — 36415 COLL VENOUS BLD VENIPUNCTURE: CPT

## 2022-11-23 PROCEDURE — 82306 VITAMIN D 25 HYDROXY: CPT

## 2022-11-23 PROCEDURE — 83970 ASSAY OF PARATHORMONE: CPT

## 2022-11-25 LAB — DEPRECATED CALCIDIOL+CALCIFEROL SERPL-MC: 39 UG/L (ref 20–75)

## 2022-12-02 ENCOUNTER — TRANSFERRED RECORDS (OUTPATIENT)
Dept: HEALTH INFORMATION MANAGEMENT | Facility: CLINIC | Age: 72
End: 2022-12-02

## 2022-12-04 ENCOUNTER — NURSE TRIAGE (OUTPATIENT)
Dept: NURSING | Facility: CLINIC | Age: 72
End: 2022-12-04

## 2022-12-04 NOTE — TELEPHONE ENCOUNTER
Nurse Triage SBAR    Is this a 2nd Level Triage? NO    Situation:  Knee replacement surgery Friday. BP was 210/100 at that time. Unsure if due to pain. History of hypertension, currently on losartan 50 mg daily that she takes in the AM.    Background: Patient,Angela, chente. Consent: not needed.    Assessment:  BP was 167/80 last night. Now today, it is 180/82.  Last took Losartan at 0815. Now BP is 186/86 1 hour after the Losartan.  Asymptomatic at this time.    Currently on pain medications. In some pain, but is manageable.   She denies HA, weakness, numbness, tingling, slurred speech, confusion, disorientation. She denies chest pain, chest tightness, difficulty breathing.  Has pain medication but is not taking it as often or as much as she can.     Protocol Recommended Disposition: See Physician within 24 hours    Recommendation: According to the protocol, Patient should be seen within 24 hours. Advised Patient to wait for a call back tomorrow. Will route message to PCP to see if she can increase BP meds, be seen, or monitor for now. Care advice given. Patient verbalizes understanding and agrees with plan of care. Reviewed concerning symptoms and when to call back and patient verbalized understanding and agreed to follow care advice given.       Aida Latham RN  [facility] Nurse Advisor  12/4/2022 at 9:25 AM           Reason for Disposition    Systolic BP  >= 180 OR Diastolic >= 110    Additional Information    Negative: Difficult to awaken or acting confused (e.g., disoriented, slurred speech)    Negative: SEVERE difficulty breathing (e.g., struggling for each breath, speaks in single words)    Negative: [1] Weakness of the face, arm or leg on one side of the body AND [2] new-onset    Negative: [1] Numbness (i.e., loss of sensation) of the face, arm or leg on one side of the body AND [2] new-onset    Negative: [1] Chest pain lasts > 5 minutes AND [2] history of heart disease (i.e., heart attack, bypass  surgery, angina, angioplasty, CHF)    Negative: [1] Chest pain AND [2] took nitrogylcerin AND [3] pain was not relieved    Negative: Sounds like a life-threatening emergency to the triager    Negative: Symptom is main concern (e.g., headache, chest pain)    Negative: Low blood pressure is main concern    Negative: [1] Systolic BP  >= 160 OR Diastolic >= 100 AND [2] cardiac or neurologic symptoms (e.g., chest pain, difficulty breathing, unsteady gait, blurred vision)    Negative: [1] Pregnant 20 or more weeks (or postpartum < 6 weeks) AND [2] new hand or face swelling    Negative: [1] Pregnant 20 or more weeks (or postpartum < 6 weeks) AND [2] Systolic BP >= 160 OR Diastolic >= 100    Negative: [1] Systolic BP  >= 200 OR Diastolic >= 120 AND [2] having NO cardiac or neurologic symptoms    Negative: [1] Pregnant 20 or more weeks (or postpartum < 6 weeks) AND [2] Systolic BP  >= 140 OR Diastolic >= 90    Negative: [1] Systolic BP  >= 180 OR Diastolic >= 110 AND [2] missed most recent dose of blood pressure medication    Protocols used: [facility]

## 2022-12-05 NOTE — TELEPHONE ENCOUNTER
Appointments in Next Year    Dec 06, 2022  1:00 PM  (Arrive by 12:40 PM)  Provider Visit with Merary Geller MD  [facility] ([facility] ) [phone]       Puja Michelle RN BSN MSN  [facility]

## 2022-12-05 NOTE — TELEPHONE ENCOUNTER
Find out what her Blood Pressure is today  I can see her tomorrow   There is virtual slot available or same day slot tomorrow  It can be used for in person visit   Dr.Nasima Yimi MD

## 2022-12-05 NOTE — TELEPHONE ENCOUNTER
Call to patient to see if she has more recent blood pressure and to see if patient remains asymptomatic. No answer. Message left for return call to clinic and ask to speak with a triage nurse.     When patient calls back:     What is patient's most recent BP and pulse?   Is patient still asymptomatic?       Routing to primary care provider, Dr. Geller, to please see triage note below and advise. This RN attempted to reach out to patient for updates.       Puja Michelle RN BSN MSN  [facility]

## 2022-12-05 NOTE — TELEPHONE ENCOUNTER
Patient calling clinic for status update.     Today's BP   [id number]  10:30am 155/78    Patient agrees to schedule appt 12/6 12:40pm with PCP. Providers schedule is currently on 'hold', unknown reason.    Appt scheduled as instructed per provider message. Patient is advised to come to clinic as scheduled, unless the clinic contacts her to reschedule. Patient verbalized understanding and agrees.

## 2022-12-06 ENCOUNTER — OFFICE VISIT (OUTPATIENT)
Dept: FAMILY MEDICINE | Facility: CLINIC | Age: 72
End: 2022-12-06
Payer: COMMERCIAL

## 2022-12-06 VITALS
HEIGHT: 61 IN | TEMPERATURE: 97.8 F | OXYGEN SATURATION: 98 % | HEART RATE: 79 BPM | DIASTOLIC BLOOD PRESSURE: 86 MMHG | RESPIRATION RATE: 20 BRPM | WEIGHT: 192.5 LBS | SYSTOLIC BLOOD PRESSURE: 140 MMHG | BODY MASS INDEX: 36.35 KG/M2

## 2022-12-06 DIAGNOSIS — E66.01 MORBID OBESITY (H): ICD-10-CM

## 2022-12-06 DIAGNOSIS — I10 BENIGN HYPERTENSION: Primary | ICD-10-CM

## 2022-12-06 DIAGNOSIS — Z96.651 STATUS POST RIGHT KNEE REPLACEMENT: ICD-10-CM

## 2022-12-06 PROCEDURE — 99213 OFFICE O/P EST LOW 20 MIN: CPT | Performed by: INTERNAL MEDICINE

## 2022-12-06 RX ORDER — MELOXICAM 7.5 MG/1
7.5 TABLET ORAL DAILY
COMMUNITY
End: 2022-12-06

## 2022-12-06 RX ORDER — POLYETHYLENE GLYCOL 3350 17 G/17G
1 POWDER, FOR SOLUTION ORAL PRN
COMMUNITY
End: 2023-10-03

## 2022-12-06 RX ORDER — AMLODIPINE BESYLATE 2.5 MG/1
2.5 TABLET ORAL DAILY
Qty: 90 TABLET | Refills: 1 | Status: SHIPPED | OUTPATIENT
Start: 2022-12-06 | End: 2022-12-13

## 2022-12-06 ASSESSMENT — PAIN SCALES - GENERAL: PAINLEVEL: MODERATE PAIN (5)

## 2022-12-06 NOTE — PROGRESS NOTES
"  Angela was seen today for hypertension.    Diagnoses and all orders for this visit:    Benign hypertension  Her blood pressure got high after her knee replacement surgery that could be due to pain  Now it is improving but is still on higher side   Treated with losartan    She was taking Xarelto and meloxiam at the same time after her right TKA surgery on 12/2/2022. She has been taking oxycodone as well post-op as needed, which she tends to take after PT.    -     amLODIPine (NORVASC) 2.5 MG tablet; Take 1 tablet (2.5 mg) by mouth daily for Blood Pressure(to be started)  Advised to avoid meloxicam with Xarelto  No ibuprofen Motrin or Aleve while on Xarelto  Anti-inflammatory pain medication can also affect blood pressure  So stick with Tylenol  Take oxycodone on as-needed basis for severe pain  If the whole tablet is a stronger than take half a tablet    Morbid obesity (H)  Will address on her next visit       Subjective   Angela is a 72 year old, presenting for the following health issues:  Hypertension      History of Present Illness       Hypertension: She presents for follow up of hypertension.  She does check blood pressure  regularly outside of the clinic. Outside blood pressures have been over 140/90. She follows a low salt diet.               Review of Systems   Constitutional, HEENT, cardiovascular, pulmonary, GI, , musculoskeletal, neuro, skin, endocrine and psych systems are negative, except as otherwise noted.      Objective    BP (!) 140/86 (BP Location: Left arm, Patient Position: Sitting)   Pulse 79   Temp 97.8  F (36.6  C) (Oral)   Resp 20   Ht 1.543 m (5' 0.75\")   Wt 87.3 kg (192 lb 8 oz)   LMP 07/23/2004   SpO2 98%   BMI 36.67 kg/m    Body mass index is 36.67 kg/m .  Physical Exam   GENERAL: healthy, alert and no distress  PSYCH: mentation appears normal, affect normal/bright   She was walking with help of walker  Right knee was swollen due to recent knee surgery      This document " serves as a record of the services and decisions personally performed and made by Dr. Geller. It was created on his behalf by Ivon Mcdonald, a trained medical scribe. The creation of this document is based the provider's statements to the medical scribe.

## 2022-12-06 NOTE — PATIENT INSTRUCTIONS
Discontinue meloxicam.    Continue Xarelto as prescribed by orthopedic surgeon    Take oxycodone 1/2 to 1 tablet as needed. Oxycodone can be habit-forming. It should be taken as prescribed. Do not mix it  with alcohol. Be careful with driving. Do not loose the prescription.  Do not overuse this medication. It is a controlled substance.     Start amlodipine 2.5 mg daily at bedtime, take this in addition to Losartan.     You can take tylenol 500 mg every 4-6 hours or 1000 mg 3 times/day for pain. Do not take more than 3000 mg in a 24 hour period.    Monitor your blood pressure once a week  at home.  Bring those readings on your next visit.  Notify us if your blood pressure readings consistently stays greater than 140/90.    Follow up in 2 months.  Seek sooner medical attention if there is any worsening of symptoms or problems.

## 2022-12-09 ENCOUNTER — NURSE TRIAGE (OUTPATIENT)
Dept: FAMILY MEDICINE | Facility: CLINIC | Age: 72
End: 2022-12-09

## 2022-12-09 NOTE — TELEPHONE ENCOUNTER
I spoke to the patient  We have recently put her on amlodipine  She is on losartan also  I think her pain related to her knee replacement surgery is making her blood pressure go higher  I advised her to check blood pressure when she is rested  Do not check right after physical therapy  As long as the blood pressure is not a skyhigh she can continue with current medication  If blood pressure goes very high and does not go down then is okay to take extra amlodipine 2.5 mg  Advise her to update me on Monday regarding her blood pressure  Dr.Nasima Yimi MD

## 2022-12-09 NOTE — TELEPHONE ENCOUNTER
"Nurse Triage SBAR    Is this a 2nd Level Triage? NO    Situation: Patient calling reporting she feels as though her BP medication is not working. Patient calling reporting BP she took two hours ago was 145/78.      Background: Patient reporting she had recent surgery and was prescribed a new BP medication. Patient has a hx of hypertension and takes losartan as well as amlodipine now.     Assessment: RN had patient take BP twice while on the phone. First BP was 164/82, second BP five minutes later was 149/72. Patient does not endorse any SOB, chest pain, or headache. Patient has not taken a dose of Amlodipine today.    Protocol Recommended Disposition:   See in Office Within 2 Weeks    Recommendation: Per disposition, RN advised patient to be seen in the office within the next two weeks. Patient agreed but would like to see what PCP recommendations are regarding increasing dosage or changing timing of dose.    Routed to provider    Does the patient meet one of the following criteria for ADS visit consideration? 16+ years old, with an [facility] PCP     TIP  Providers, please consider if this condition is appropriate for management at one of our Acute and Diagnostic Services sites.     If patient is a good candidate, please use dotphrase <dot>triageresponse and select Refer to ADS to document.      1. BLOOD PRESSURE: \"What is the blood pressure?\" \"Did you take at least two measurements 5 minutes apart?\"    First BP on the phone with RN was 164/82, second BP five minutes afterward was 149/72.    2. ONSET: \"When did you take your blood pressure?\"    Patient took BP \"2 hours ago\", and it was 145/78. Patient took BP again on the phone with RN, first was 164/82, second was 149/72.    3. HOW: \"How did you obtain the blood pressure?\" (e.g., visiting nurse, automatic home BP monitor)    Automatic BP machine.    4. HISTORY: \"Do you have a history of high blood pressure?\"    Yes    5. MEDICATIONS: \"Are you taking any medications for " "blood pressure?\" \"Have you missed any doses recently?\"    Taking losartan too, 50 in the morning and 50 at night    6. OTHER SYMPTOMS: \"Do you have any symptoms?\" (e.g., headache, chest pain, blurred vision, difficulty breathing, weakness)    No    7. PREGNANCY: \"Is there any chance you are pregnant?\" \"When was your last menstrual period?\"    N/A      Reason for Disposition    Systolic BP >= 130 OR Diastolic >= 80, and is taking BP medications    Additional Information    Negative: Sounds like a life-threatening emergency to the triager    Negative: Symptom is main concern (e.g., headache, chest pain)    Negative: Low blood pressure is main concern    Negative: Systolic BP >= 160 OR Diastolic >= 100, and any cardiac or neurologic symptoms (e.g., chest pain, difficulty breathing, unsteady gait, blurred vision)    Negative: Pregnant 20 or more weeks (or postpartum < 6 weeks) with new hand or face swelling    Negative: Pregnant 20 or more weeks (or postpartum < 6 weeks) and Systolic BP >= 160 OR Diastolic >= 100    Negative: Patient sounds very sick or weak to the triager    Negative: Systolic BP >= 200 OR Diastolic >= 120 and having NO cardiac or neurologic symptoms    Negative: Pregnant 20 or more weeks (or postpartum < 6 weeks) with Systolic BP >= 140 OR Diastolic >= 90    Negative: Systolic BP >= 180 OR Diastolic >= 110, and missed most recent dose of blood pressure medication    Negative: Systolic BP >= 180 OR Diastolic >= 110    Negative: Patient wants to be seen    Negative: Ran out of BP medications    Negative: Taking BP medications and feels is having side effects (e.g., impotence, cough, dizziness)    Negative: Systolic BP >= 160 OR Diastolic >= 100    Negative: Systolic BP >= 130 OR Diastolic >= 80, and pregnant    Protocols used: BLOOD PRESSURE - HIGH-A-OH      "

## 2022-12-13 ENCOUNTER — TELEPHONE (OUTPATIENT)
Dept: FAMILY MEDICINE | Facility: CLINIC | Age: 72
End: 2022-12-13

## 2022-12-13 DIAGNOSIS — I10 BENIGN HYPERTENSION: ICD-10-CM

## 2022-12-13 RX ORDER — AMLODIPINE BESYLATE 2.5 MG/1
2.5 TABLET ORAL 2 TIMES DAILY
Qty: 180 TABLET | Refills: 1 | Status: SHIPPED | OUTPATIENT
Start: 2022-12-13 | End: 2023-02-06

## 2022-12-13 NOTE — TELEPHONE ENCOUNTER
Patient calling to provide update about her blood pressure.  Reports BP was 165/80 on Friday, 12/9.   Reports on 12/9, 12/10, 12/11, her BP was running 145-150/70.   Reports she took the additional 2.5 mg of amlodipine for total of 5 mg amlodipine at night on 12/9, 12/10, 12/11.     Yesterday patient states she took 2.5 mg Amlodipine in the morning and then 2.5 mg at night. She reports she wanted to space them out.     This morning patient took 2.5 mg of Amlodipine and her BP was 138/69.     Patient reports she continues to take her Losartan 50 mg BID.   Patient also reports she has been asymptomatic with her elevated blood pressure readings.       From 12/9/2022 nurse triage encounter:         Patient would like call back with provider's response. Okay to leave detailed message on mobile number, [phone].         Puja Michelle RN BSN MSN  [facility]

## 2022-12-13 NOTE — TELEPHONE ENCOUNTER
I spoke to the patient  Amlodipine 2.5 twice a day works better  Okay to stay with amlodipine 2.5 twice a day  New prescription is sent  If blood pressure stays too low then we can lower her amlodipine to 2.5 mg daily  She is in agreement  Wanted me to send the prescription to mail order pharmacy  Dr.Nasima Yimi MD

## 2022-12-15 ENCOUNTER — TRANSFERRED RECORDS (OUTPATIENT)
Dept: HEALTH INFORMATION MANAGEMENT | Facility: CLINIC | Age: 72
End: 2022-12-15

## 2023-01-12 ENCOUNTER — TRANSFERRED RECORDS (OUTPATIENT)
Dept: HEALTH INFORMATION MANAGEMENT | Facility: CLINIC | Age: 73
End: 2023-01-12

## 2023-01-23 ENCOUNTER — OFFICE VISIT (OUTPATIENT)
Dept: DERMATOLOGY | Facility: CLINIC | Age: 73
End: 2023-01-23
Payer: COMMERCIAL

## 2023-01-23 DIAGNOSIS — L57.8 ACTINIC SKIN DAMAGE: ICD-10-CM

## 2023-01-23 DIAGNOSIS — L82.1 SEBORRHEIC KERATOSES: ICD-10-CM

## 2023-01-23 DIAGNOSIS — L30.4 INTERTRIGO: Primary | ICD-10-CM

## 2023-01-23 PROCEDURE — 99214 OFFICE O/P EST MOD 30 MIN: CPT | Performed by: STUDENT IN AN ORGANIZED HEALTH CARE EDUCATION/TRAINING PROGRAM

## 2023-01-23 ASSESSMENT — PAIN SCALES - GENERAL: PAINLEVEL: NO PAIN (0)

## 2023-01-23 NOTE — LETTER
1/23/2023         RE: Angela Story  [address]  [place] [address]        Dear Colleague,    Thank you for referring your patient, Angela tSory, to the [facility]. Please see a copy of my visit note below.    [facility] Dermatology Note    Encounter Date: Jan 23, 2023    Dermatology Problem List:  -   ______________________________________    Impression/Plan:  Angela was seen today for skin check.    Diagnoses and all orders for this visit:    Intertrigo  -Worse in the summer responds to nystatin powder but recurs when she stops using it  - Uses intermittently  - Discussed that if the factors that predispose to the development of the condition are still present treatment is likely still required and so maintenance therapy possibly 3 times a week might prevent recurrences    Actinic skin damage  - discussed sunprotective behaviors, clothing, and the use of sunscreen  -Patient wishes to move to as needed skin exams, given that she is 9 years old has no history of skin cancers and does not have any actinic keratosis on exam today using shared decision making, this is a reasonable course of action.  Counseled patient on red flag signs for skin lesions that should prompt her to make an appointment    Seborrheic keratoses  - benign          Follow-up PRN.       Staff Involved:  Staff Only    Frandy Estes MD   of Dermatology  Department of Dermatology  [facility]      CC:   Chief Complaint   Patient presents with     Skin Check       History of Present Illness:  Ms. Angela Story is a 72 year old female who presents as a return patient.    Brown spots on face  Irritation under breasts worse in the summer, uses nystatin which helps.     Labs:      Physical exam:  Vitals: LMP 07/23/2004   GEN: well developed, well-nourished, in no acute distress, in a pleasant mood.     SKIN: Sam phototype 1  -  Full skin, which includes the head/face, both arms, chest, back, abdomen,both legs, genitalia and/or groin buttocks, digits and/or nails, was examined.  - Stuck on brown papules on trunk and extremities   - Flat brown macules and patches in a sun exposed areas on face and extremities  - resolved irritation under breasts  - No other lesions of concern on areas examined.     Past Medical History:   Past Medical History:   Diagnosis Date     Bilateral bunions 2014     CKD (chronic kidney disease) stage 3, GFR 30-59 ml/min (H)      Gastro-oesophageal reflux disease      Hyperlipidemia LDL goal <100 2010     Hypertension goal BP (blood pressure) < 140/90 2013     IFG (impaired fasting glucose) 2016     Osteoarthritis of acromioclavicular joint      PONV (postoperative nausea and vomiting)      Thyroid nodule 2015     Past Surgical History:   Procedure Laterality Date     BIOPSY      Breast lump     BREAST SURGERY      See above     BUNIONECTOMY  2014    Procedure: BUNIONECTOMY;  Surgeon: Kevin Rodas DPM;  Location: [facility] OR     COLONOSCOPY  ; 2018     COMBINED REPAIR PTOSIS WITH BLEPHAROPLASTY Bilateral 2021    Procedure: Bilateral upper eyelid blepharoplasty wtih ptosis repair;  Surgeon: Gregor Harris MD;  Location: [facility] OR     EXCISE MASS BACK  2014    Procedure: EXCISE MASS BACK;  Surgeon: Chaim Lacy MD;  Location: [facility] OR     HC DILATION/CURETTAGE DIAG/THER NON OB  1982    D & C     surgery for ptosis of both eyelids       TONSILLECTOMY      tonsillectomy     Z  DELIVERY ONLY      , Low Cervical     Z NONSPECIFIC PROCEDURE  1986    lumpectomy, lt     ZZC NONSPECIFIC PROCEDURE      ob      ZZC NONSPECIFIC PROCEDURE  1983    Vaginal delivery x 1     ZZHC ARTHROTOMY W/OPEN MENISCUS REPAIR      lt knee       Social History:   reports that she has never smoked. She has never used smokeless tobacco. She reports current alcohol  use. She reports that she does not use drugs.    Family History:  Family History   Problem Relation Age of Onset     Lipids Mother      Hypertension Mother      Hyperlipidemia Mother      Cancer Father         pancreatic     Alcohol/Drug Father         etoh     Other Cancer Father      Depression Father      Substance Abuse Father      Heart Disease Maternal Grandfather      Coronary Artery Disease Maternal Grandfather      Cerebrovascular Disease Paternal Grandfather      Psychotic Disorder Sister         bi polar     Hypertension Sister      Hyperlipidemia Sister      Neurologic Disorder Sister         ms     Hypertension Sister      Mental Illness Sister         BiPolar     Anesthesia Reaction Sister      Thyroid Disease Sister      Obesity Sister      Hyperlipidemia Sister      Neurologic Disorder Sister         cidp     Cerebrovascular Disease Paternal Grandmother      Hypertension Brother      Obesity Brother      Hyperlipidemia Brother        Medications:  Current Outpatient Medications   Medication Sig Dispense Refill     nystatin (MYCOSTATIN) 869396 UNIT/GM external powder Apply topically 2 times daily 60 g 1     amLODIPine (NORVASC) 2.5 MG tablet Take 1 tablet (2.5 mg) by mouth 2 times daily for Blood Pressure 180 tablet 1     Blood Pressure Monitoring KIT 1 kit daily 1 kit 0     cholecalciferol 25 MCG (1000 UT) TABS Take 1,000 Units by mouth       citalopram (CELEXA) 20 MG tablet Take 1 tablet (20 mg) by mouth daily 90 tablet 1     HYDROXYZINE HCL PO        levothyroxine (SYNTHROID/LEVOTHROID) 25 MCG tablet Take 1.5 tablets (37.5 mcg) by mouth daily 135 tablet 3     losartan (COZAAR) 50 MG tablet Take 1 tablet (50 mg) by mouth 2 times daily for Blood Pressure 180 tablet 3     omeprazole (PRILOSEC) 20 MG DR capsule Take 1 capsule (20 mg) by mouth daily as needed 90 capsule 3     polyethylene glycol (MIRALAX) 17 GM/Dose powder Take 1 capful by mouth daily       pravastatin (PRAVACHOL) 20 MG tablet Take 1  tablet (20 mg) by mouth daily for cholestrol 90 tablet 3     rivaroxaban ANTICOAGULANT (XARELTO) 10 MG TABS tablet Take by mouth daily (with dinner) (Patient not taking: Reported on 1/23/2023)       scopolamine (TRANSDERM) 72 hr patch Apply 1 patch to hairless area behind one ear at least 4 hours before travel.  Remove old patch and change every 3 days (72 hours). (Patient not taking: Reported on 1/23/2023) 4 patch 11     Selenium 200 MCG TABS tablet Take 200 mcg by mouth daily       SENNA PO        vitamin D3 (CHOLECALCIFEROL) 50 mcg (2000 units) tablet Take 1 tablet (50 mcg) by mouth daily       Allergies   Allergen Reactions     Norco [Hydrocodone-Acetaminophen] Dizziness and Nausea and Vomiting     Eggs Nausea and Vomiting and Diarrhea     Guaifenesin Hives     Lisinopril Cough     Mucinex Hives     Penicillins Rash     Sulfa Drugs Hives     Sulfites Nausea and Vomiting and Diarrhea     Sulfametrole Rash                   Again, thank you for allowing me to participate in the care of your patient.        Sincerely,        Frandy Estes MD

## 2023-01-23 NOTE — PROGRESS NOTES
[facility] Dermatology Note    Encounter Date: Jan 23, 2023    Dermatology Problem List:  -   ______________________________________    Impression/Plan:  Angela was seen today for skin check.    Diagnoses and all orders for this visit:    Intertrigo  -Worse in the summer responds to nystatin powder but recurs when she stops using it  - Uses intermittently  - Discussed that if the factors that predispose to the development of the condition are still present treatment is likely still required and so maintenance therapy possibly 3 times a week might prevent recurrences    Actinic skin damage  - discussed sunprotective behaviors, clothing, and the use of sunscreen  -Patient wishes to move to as needed skin exams, given that she is 9 years old has no history of skin cancers and does not have any actinic keratosis on exam today using shared decision making, this is a reasonable course of action.  Counseled patient on red flag signs for skin lesions that should prompt her to make an appointment    Seborrheic keratoses  - benign          Follow-up PRN.       Staff Involved:  Staff Only    Frandy Estes MD   of Dermatology  Department of Dermatology  [facility]      CC:   Chief Complaint   Patient presents with     Skin Check       History of Present Illness:  Ms. Angela Story is a 72 year old female who presents as a return patient.    Brown spots on face  Irritation under breasts worse in the summer, uses nystatin which helps.     Labs:      Physical exam:  Vitals: LMP 07/23/2004   GEN: well developed, well-nourished, in no acute distress, in a pleasant mood.     SKIN: Sam phototype 1  - Full skin, which includes the head/face, both arms, chest, back, abdomen,both legs, genitalia and/or groin buttocks, digits and/or nails, was examined.  - Stuck on brown papules on trunk and extremities   - Flat brown macules and patches in a sun exposed  areas on face and extremities  - resolved irritation under breasts  - No other lesions of concern on areas examined.     Past Medical History:   Past Medical History:   Diagnosis Date     Bilateral bunions 2014     CKD (chronic kidney disease) stage 3, GFR 30-59 ml/min (H)      Gastro-oesophageal reflux disease      Hyperlipidemia LDL goal <100 2010     Hypertension goal BP (blood pressure) < 140/90 2013     IFG (impaired fasting glucose) 2016     Osteoarthritis of acromioclavicular joint      PONV (postoperative nausea and vomiting)      Thyroid nodule 2015     Past Surgical History:   Procedure Laterality Date     BIOPSY      Breast lump     BREAST SURGERY      See above     BUNIONECTOMY  2014    Procedure: BUNIONECTOMY;  Surgeon: Kevin Rodas DPM;  Location: [facility] OR     COLONOSCOPY  ;      COMBINED REPAIR PTOSIS WITH BLEPHAROPLASTY Bilateral 2021    Procedure: Bilateral upper eyelid blepharoplasty wtih ptosis repair;  Surgeon: Gregor Harris MD;  Location: [facility] OR     EXCISE MASS BACK  2014    Procedure: EXCISE MASS BACK;  Surgeon: Chaim Lacy MD;  Location: [facility] OR     HC DILATION/CURETTAGE DIAG/THER NON OB  1982    D & C     surgery for ptosis of both eyelids       TONSILLECTOMY  1966    tonsillectomy     ZZ  DELIVERY ONLY      , Low Cervical     Z NONSPECIFIC PROCEDURE  1986    lumpectomy, lt     ZZC NONSPECIFIC PROCEDURE      ob      ZZC NONSPECIFIC PROCEDURE  1983    Vaginal delivery x 1     ZZHC ARTHROTOMY W/OPEN MENISCUS REPAIR      lt knee       Social History:   reports that she has never smoked. She has never used smokeless tobacco. She reports current alcohol use. She reports that she does not use drugs.    Family History:  Family History   Problem Relation Age of Onset     Lipids Mother      Hypertension Mother      Hyperlipidemia Mother      Cancer Father         pancreatic     Alcohol/Drug Father          etoh     Other Cancer Father      Depression Father      Substance Abuse Father      Heart Disease Maternal Grandfather      Coronary Artery Disease Maternal Grandfather      Cerebrovascular Disease Paternal Grandfather      Psychotic Disorder Sister         bi polar     Hypertension Sister      Hyperlipidemia Sister      Neurologic Disorder Sister         ms     Hypertension Sister      Mental Illness Sister         BiPolar     Anesthesia Reaction Sister      Thyroid Disease Sister      Obesity Sister      Hyperlipidemia Sister      Neurologic Disorder Sister         cidp     Cerebrovascular Disease Paternal Grandmother      Hypertension Brother      Obesity Brother      Hyperlipidemia Brother        Medications:  Current Outpatient Medications   Medication Sig Dispense Refill     nystatin (MYCOSTATIN) 904400 UNIT/GM external powder Apply topically 2 times daily 60 g 1     amLODIPine (NORVASC) 2.5 MG tablet Take 1 tablet (2.5 mg) by mouth 2 times daily for Blood Pressure 180 tablet 1     Blood Pressure Monitoring KIT 1 kit daily 1 kit 0     cholecalciferol 25 MCG (1000 UT) TABS Take 1,000 Units by mouth       citalopram (CELEXA) 20 MG tablet Take 1 tablet (20 mg) by mouth daily 90 tablet 1     HYDROXYZINE HCL PO        levothyroxine (SYNTHROID/LEVOTHROID) 25 MCG tablet Take 1.5 tablets (37.5 mcg) by mouth daily 135 tablet 3     losartan (COZAAR) 50 MG tablet Take 1 tablet (50 mg) by mouth 2 times daily for Blood Pressure 180 tablet 3     omeprazole (PRILOSEC) 20 MG DR capsule Take 1 capsule (20 mg) by mouth daily as needed 90 capsule 3     polyethylene glycol (MIRALAX) 17 GM/Dose powder Take 1 capful by mouth daily       pravastatin (PRAVACHOL) 20 MG tablet Take 1 tablet (20 mg) by mouth daily for cholestrol 90 tablet 3     rivaroxaban ANTICOAGULANT (XARELTO) 10 MG TABS tablet Take by mouth daily (with dinner) (Patient not taking: Reported on 1/23/2023)       scopolamine (TRANSDERM) 72 hr patch Apply 1 patch to  hairless area behind one ear at least 4 hours before travel.  Remove old patch and change every 3 days (72 hours). (Patient not taking: Reported on 1/23/2023) 4 patch 11     Selenium 200 MCG TABS tablet Take 200 mcg by mouth daily       SENNA PO        vitamin D3 (CHOLECALCIFEROL) 50 mcg (2000 units) tablet Take 1 tablet (50 mcg) by mouth daily       Allergies   Allergen Reactions     Norco [Hydrocodone-Acetaminophen] Dizziness and Nausea and Vomiting     Eggs Nausea and Vomiting and Diarrhea     Guaifenesin Hives     Lisinopril Cough     Mucinex Hives     Penicillins Rash     Sulfa Drugs Hives     Sulfites Nausea and Vomiting and Diarrhea     Sulfametrole Rash

## 2023-02-06 ENCOUNTER — OFFICE VISIT (OUTPATIENT)
Dept: FAMILY MEDICINE | Facility: CLINIC | Age: 73
End: 2023-02-06
Payer: COMMERCIAL

## 2023-02-06 VITALS
DIASTOLIC BLOOD PRESSURE: 84 MMHG | SYSTOLIC BLOOD PRESSURE: 129 MMHG | HEIGHT: 61 IN | WEIGHT: 191 LBS | RESPIRATION RATE: 18 BRPM | OXYGEN SATURATION: 98 % | HEART RATE: 71 BPM | BODY MASS INDEX: 36.06 KG/M2

## 2023-02-06 DIAGNOSIS — Z96.651 STATUS POST RIGHT KNEE REPLACEMENT: ICD-10-CM

## 2023-02-06 DIAGNOSIS — R53.83 TIREDNESS: ICD-10-CM

## 2023-02-06 DIAGNOSIS — F41.1 GENERALIZED ANXIETY DISORDER: ICD-10-CM

## 2023-02-06 DIAGNOSIS — Z13.0 SCREENING FOR DEFICIENCY ANEMIA: ICD-10-CM

## 2023-02-06 DIAGNOSIS — I10 BENIGN HYPERTENSION: Primary | ICD-10-CM

## 2023-02-06 DIAGNOSIS — Z87.898 H/O MOTION SICKNESS: ICD-10-CM

## 2023-02-06 DIAGNOSIS — E78.5 HYPERLIPIDEMIA, UNSPECIFIED HYPERLIPIDEMIA TYPE: ICD-10-CM

## 2023-02-06 DIAGNOSIS — E66.01 MORBID OBESITY (H): ICD-10-CM

## 2023-02-06 DIAGNOSIS — E03.9 HYPOTHYROIDISM, UNSPECIFIED TYPE: ICD-10-CM

## 2023-02-06 DIAGNOSIS — N18.31 STAGE 3A CHRONIC KIDNEY DISEASE (H): ICD-10-CM

## 2023-02-06 DIAGNOSIS — Z86.16 HISTORY OF COVID-19: ICD-10-CM

## 2023-02-06 DIAGNOSIS — Z79.899 MEDICATION MANAGEMENT: ICD-10-CM

## 2023-02-06 LAB
ANION GAP SERPL CALCULATED.3IONS-SCNC: 12 MMOL/L (ref 7–15)
BUN SERPL-MCNC: 11.4 MG/DL (ref 8–23)
CALCIUM SERPL-MCNC: 9.6 MG/DL (ref 8.8–10.2)
CHLORIDE SERPL-SCNC: 104 MMOL/L (ref 98–107)
CHOLEST SERPL-MCNC: 201 MG/DL
CREAT SERPL-MCNC: 0.98 MG/DL (ref 0.51–0.95)
DEPRECATED HCO3 PLAS-SCNC: 26 MMOL/L (ref 22–29)
ERYTHROCYTE [DISTWIDTH] IN BLOOD BY AUTOMATED COUNT: 13 % (ref 10–15)
GFR SERPL CREATININE-BSD FRML MDRD: 62 ML/MIN/1.73M2
GLUCOSE SERPL-MCNC: 116 MG/DL (ref 70–99)
HCT VFR BLD AUTO: 41.9 % (ref 35–47)
HDLC SERPL-MCNC: 73 MG/DL
HGB BLD-MCNC: 13.6 G/DL (ref 11.7–15.7)
LDLC SERPL CALC-MCNC: 106 MG/DL
MCH RBC QN AUTO: 29.2 PG (ref 26.5–33)
MCHC RBC AUTO-ENTMCNC: 32.5 G/DL (ref 31.5–36.5)
MCV RBC AUTO: 90 FL (ref 78–100)
NONHDLC SERPL-MCNC: 128 MG/DL
PLATELET # BLD AUTO: 218 10E3/UL (ref 150–450)
POTASSIUM SERPL-SCNC: 4.3 MMOL/L (ref 3.4–5.3)
RBC # BLD AUTO: 4.65 10E6/UL (ref 3.8–5.2)
SODIUM SERPL-SCNC: 142 MMOL/L (ref 136–145)
TRIGL SERPL-MCNC: 108 MG/DL
TSH SERPL DL<=0.005 MIU/L-ACNC: 2.97 UIU/ML (ref 0.3–4.2)
WBC # BLD AUTO: 6 10E3/UL (ref 4–11)

## 2023-02-06 PROCEDURE — 80061 LIPID PANEL: CPT | Performed by: INTERNAL MEDICINE

## 2023-02-06 PROCEDURE — 36415 COLL VENOUS BLD VENIPUNCTURE: CPT | Performed by: INTERNAL MEDICINE

## 2023-02-06 PROCEDURE — 99214 OFFICE O/P EST MOD 30 MIN: CPT | Performed by: INTERNAL MEDICINE

## 2023-02-06 PROCEDURE — 84443 ASSAY THYROID STIM HORMONE: CPT | Performed by: INTERNAL MEDICINE

## 2023-02-06 PROCEDURE — 80048 BASIC METABOLIC PNL TOTAL CA: CPT | Performed by: INTERNAL MEDICINE

## 2023-02-06 PROCEDURE — 85027 COMPLETE CBC AUTOMATED: CPT | Performed by: INTERNAL MEDICINE

## 2023-02-06 RX ORDER — LOSARTAN POTASSIUM 50 MG/1
50 TABLET ORAL 2 TIMES DAILY
Qty: 180 TABLET | Refills: 3 | Status: SHIPPED | OUTPATIENT
Start: 2023-02-06 | End: 2024-01-08

## 2023-02-06 RX ORDER — SCOLOPAMINE TRANSDERMAL SYSTEM 1 MG/1
PATCH, EXTENDED RELEASE TRANSDERMAL
Qty: 4 PATCH | Refills: 11 | Status: SHIPPED | OUTPATIENT
Start: 2023-02-06 | End: 2023-10-03

## 2023-02-06 RX ORDER — AMLODIPINE BESYLATE 2.5 MG/1
2.5 TABLET ORAL 2 TIMES DAILY
Qty: 180 TABLET | Refills: 3 | Status: SHIPPED | OUTPATIENT
Start: 2023-02-06 | End: 2023-10-03

## 2023-02-06 RX ORDER — CITALOPRAM HYDROBROMIDE 20 MG/1
20 TABLET ORAL DAILY
Qty: 90 TABLET | Refills: 3 | Status: SHIPPED | OUTPATIENT
Start: 2023-02-06 | End: 2023-10-03

## 2023-02-06 RX ORDER — PRAVASTATIN SODIUM 20 MG
20 TABLET ORAL DAILY
Qty: 90 TABLET | Refills: 3 | Status: SHIPPED | OUTPATIENT
Start: 2023-02-06 | End: 2023-10-03

## 2023-02-06 ASSESSMENT — PAIN SCALES - GENERAL: PAINLEVEL: NO PAIN (0)

## 2023-02-06 NOTE — PROGRESS NOTES
Angela was seen today for hypertension.    Diagnoses and all orders for this visit:    Benign hypertension  -     Reviewed blood pressure 129/84  - Reviewed home BP readings provided by patient   - She is on amlodipine   - amLODIPine (NORVASC) 2.5 MG tablet; Take 1 tablet (2.5 mg) by mouth 2 times daily for Blood Pressure  -     losartan (COZAAR) 50 MG tablet; Take 1 tablet (50 mg) by mouth 2 times daily for Blood Pressure  Home blood pressure readings are under excellent control except for 1 or 2 times when it was slightly high at    Morbid obesity (H)        - Will address in next visit   Discussed healthy diet and exercise which is good for her physical and mental health board  Exercise makes urine neurotransmitter go up    Stage 3a chronic kidney disease (H)        - Chronic and stable    Generalized anxiety disorder  -     Controlled with citalopram   - citalopram (CELEXA) 20 MG tablet; Take 1 tablet (20 mg) by mouth daily    Hyperlipidemia, unspecified hyperlipidemia type  -    On pravastatin   - pravastatin (PRAVACHOL) 20 MG tablet; Take 1 tablet (20 mg) by mouth daily for cholesterol  -     Lipid panel reflex to direct LDL Fasting; Future    H/O motion sickness  -     scopolamine (TRANSDERM) 1 MG/3DAYS 72 hr patch; Apply 1 patch to hairless area behind one ear at least 4 hours before travel.  Remove old patch and change every 3 days (72 hours).  She uses for travel and wanted to return prescription    History of COVID-19  Comments:  5/2022    Tiredness  -     CBC with platelets; Future    Medication management  -     Basic metabolic panel  (Ca, Cl, CO2, Creat, Gluc, K, Na, BUN); Future    Hypothyroidism, unspecified type  -     TSH with free T4 reflex; Future    Screening for deficiency anemia        - She complains of fatigue recently     Status post right knee replacement        - Improving after the surgery        - She recently signed up with .         - Explained her to start slow on  "her exercise and not to push her body to the maximum.   Other        - Cerumen impaction on right ear.         -  has an upcoming left knee surgery in  05/2023        - She has a plan to travel to [place]     Choco Miller is a 72 year old, presenting for the following health issues:  Hypertension       History of Present Illness       Hypertension: She presents for follow up of hypertension.  She does check blood pressure  regularly outside of the clinic. Outside blood pressures have been over 140/90. She follows a low salt diet.         Review of Systems   Constitutional, HEENT, cardiovascular, pulmonary, GI, , musculoskeletal, neuro, skin, endocrine and psych systems are negative, except as otherwise noted.      Objective    /84 (BP Location: Right arm, Patient Position: Sitting, Cuff Size: Adult Large)   Pulse 71   Resp 18   Ht 1.543 m (5' 0.75\")   Wt 86.6 kg (191 lb)   LMP 07/23/2004   SpO2 98%   BMI 36.39 kg/m    Body mass index is 36.39 kg/m .     Physical Exam     GENERAL APPEARANCE: healthy, alert and no distress  EYES: Eyes grossly normal to inspection, PERRL and conjunctivae and sclerae normal  HENT: ear canals and TM's normal and nose and mouth without ulcers or lesions  NECK: no adenopathy  RESP: lungs clear to auscultation - no rales, rhonchi or wheezes  CV: regular rates and rhythm, normal S1 S2, no S3    Labs pending.     This document serves as a record of the services and decisions personally performed and made by Dr. Geller. It was created on her behalf by Trey Marrufo, a trained medical scribe. The creation of this document is based the provider's statements to the medical scribe.            "

## 2023-02-06 NOTE — PATIENT INSTRUCTIONS
Labs today  Follow up in 3 months.  Seek sooner medical attention if there is any worsening of symptoms or problems.

## 2023-02-07 NOTE — RESULT ENCOUNTER NOTE
Jessica Miller    This is to inform you regarding your test result.    Your total cholesterol is elevated   HDL which is called good cholesterol is normal.  Your LDL cholesterol is elevated .  Your triglycerides are normal.  The testing of your kidney function, liver function and electrolytes was satisfactory   Glucose which is your blood sugar is slightly elevated.  Avoid high sugar containing food.  TSH which is thyroid hormone is normal.  CBC result which includes white count Hemoglobin and  Platelet Counts is normal.         Sincerely,      Dr.Nasima Yimi MD,FACP

## 2023-02-20 ENCOUNTER — OFFICE VISIT (OUTPATIENT)
Dept: OPHTHALMOLOGY | Facility: CLINIC | Age: 73
End: 2023-02-20
Attending: OPHTHALMOLOGY
Payer: COMMERCIAL

## 2023-02-20 ENCOUNTER — LAB (OUTPATIENT)
Dept: LAB | Facility: CLINIC | Age: 73
End: 2023-02-20
Attending: OPHTHALMOLOGY
Payer: COMMERCIAL

## 2023-02-20 DIAGNOSIS — E06.3 HYPOTHYROIDISM DUE TO HASHIMOTO'S THYROIDITIS: ICD-10-CM

## 2023-02-20 DIAGNOSIS — H57.89 THYROID EYE DISEASE: ICD-10-CM

## 2023-02-20 DIAGNOSIS — E07.9 THYROID EYE DISEASE: Primary | ICD-10-CM

## 2023-02-20 DIAGNOSIS — H57.89 THYROID EYE DISEASE: Primary | ICD-10-CM

## 2023-02-20 DIAGNOSIS — E07.9 THYROID EYE DISEASE: ICD-10-CM

## 2023-02-20 PROCEDURE — 99213 OFFICE O/P EST LOW 20 MIN: CPT | Mod: GC | Performed by: OPHTHALMOLOGY

## 2023-02-20 PROCEDURE — 92285 EXTERNAL OCULAR PHOTOGRAPHY: CPT | Performed by: OPHTHALMOLOGY

## 2023-02-20 PROCEDURE — 84445 ASSAY OF TSI GLOBULIN: CPT

## 2023-02-20 PROCEDURE — G0463 HOSPITAL OUTPT CLINIC VISIT: HCPCS

## 2023-02-20 PROCEDURE — 36415 COLL VENOUS BLD VENIPUNCTURE: CPT

## 2023-02-20 ASSESSMENT — CONF VISUAL FIELD
OS_NORMAL: 1
OS_INFERIOR_TEMPORAL_RESTRICTION: 0
OD_SUPERIOR_TEMPORAL_RESTRICTION: 0
OS_SUPERIOR_NASAL_RESTRICTION: 0
OS_INFERIOR_NASAL_RESTRICTION: 0
OD_INFERIOR_NASAL_RESTRICTION: 0
OS_SUPERIOR_TEMPORAL_RESTRICTION: 0
OD_NORMAL: 1
OD_INFERIOR_TEMPORAL_RESTRICTION: 0
OD_SUPERIOR_NASAL_RESTRICTION: 0

## 2023-02-20 ASSESSMENT — VISUAL ACUITY
CORRECTION_TYPE: GLASSES
OS_CC+: -3
OS_CC: 20/20
OD_CC: 20/15
METHOD: SNELLEN - LINEAR

## 2023-02-20 ASSESSMENT — EXTERNAL EXAM - RIGHT EYE: OD_EXAM: NORMAL

## 2023-02-20 ASSESSMENT — MARGIN REFLEX DISTANCE
OS_MRD1: 4
OD_MRD1: 4

## 2023-02-20 ASSESSMENT — REFRACTION_WEARINGRX
OD_SPHERE: -4.25
OS_ADD: +2.75
OD_ADD: +2.75
OS_SPHERE: -4.00
OS_AXIS: 175
OD_CYLINDER: +2.00
OS_CYLINDER: +1.25
OD_AXIS: 176

## 2023-02-20 ASSESSMENT — TONOMETRY
IOP_METHOD: ICARE
OS_IOP_MMHG: 10
OD_IOP_MMHG: 10

## 2023-02-20 ASSESSMENT — EXTERNAL EXAM - LEFT EYE: OS_EXAM: NORMAL

## 2023-02-20 ASSESSMENT — LAGOPHTHALMOS
OS_LAGOPHTHALMOS: 0
OD_LAGOPHTHALMOS: 0

## 2023-02-20 NOTE — NURSING NOTE
Chief Complaint(s) and History of Present Illness(es)     Thyroid Disease            Laterality: both eyes    Comments: Happy with lid position. Swelling has seemed to worsen. Vision changes throughout the day, blurred more often now. Not really seeing diplopia. Has questions about gluten with Thyroid - what are your thoughts on this?           Comments    2/6/23 TSH 2.97     Levothyroxine 37 mg

## 2023-02-20 NOTE — PROGRESS NOTES
Assessment & Plan     HPI: Patient is a 69 year old female diagnosed with Hashimoto's thyroiditis in June 2019 and on levothyroxine. She was last seen in [facility] on 11/16/2020.    Since then she has had BULB with bilateral ptosis repair/MMCR on 6/30/21. Since then patient thinks her upper eyelids have been swelling a bit more lately. The swelling typically occurs in the morning and improves throughout the day. No eye pain, diplopia, proptosis. Rare redness and blurry vision. She does not use any lubricating.     She is on levothyroxine 37.5mcg daily. Non-smoker.     Thyroid history:  Diagnosed when? June 2019 with Hashimoto's  BROOKS: NA  Thyroidectomy: NA     TSI (date): 410 % baseline (normal < 140%) (12/2019)    Previous results:NA    Eye symptoms (since 1/2020):   Proptosis (better/worse/same since last visit): None  Diplopia(better/worse/same since last visit): None  Eyelid retraction(better/worse/same since last visit): None  Tearing(better/worse/same since last visit): Better (no longer having tearing)  Redness (better/worse/same since last visit): Better (rare)  Pain (better/worse/same since last visit): None  Pain to move the eyes (better/worse/same since last visit): None  Blurred vision: Same (rare)     Ocular history:   Orbital decompression (date, details): NA  Strabismus surgery (date, details): NA  Eyelid surgery (date, details): BULB with bilateral ptosis repair/MMCR on 6/30/21    Exam:   Loulou (base): Base 100, 19/20     Better/worse same: same  Strabismus (better/worse/same): same  Eyelid retraction (better/worse/same): none    ABDULLAHI:  1. Spontaneous orbital pain.     no  2. Gaze evoked orbital pain.     no  3. Eyelid swelling due to active thyroid eye disease  no  4. Eyelid erythema.       no  5. Conjunctival redness due to active thyroid eye disease . no  6. Chemosis.        no  7. Inflammation of caruncle OR plica.    no    Patients assessed after follow-up can be scored out of 10  by  including items 8-10.    8. Increase of > 2mm in proptosis.    no   9. Decrease in uniocular excursion in any direction of > 8 . no  10. Decrease of acuity equivalent to 1 Snellen line.  no    ABDULLAHI SCORE = 0/10       Angela Story is a 70 year old female with the following diagnoses:   1. Thyroid eye disease    2. Hypothyroidism due to Hashimoto's thyroiditis         Angela is a 69 year old female with a history of Hashimotos diagnosed in 6/2019 who presents for f/u thyroid eye disease. At her initial visit in thyroid eye disease clinic in 1/2020 she had significant shaal-ocular edema and elevated TSI consistent with hypothyroid active thyroid eye disease.     Today she is here for follow-up, last visit 11/2020. Has since has BULB with bilateral ptosis repair (MMCR) with Dr. Harris on 6/30/21. Doing well overall but noting few episodes of upper lid swelling in the mornings.    Exam today is very stable. Strabismus exam stable, Loulou stable, no diplopia. No optic neuropathy.  No active inflammation, ABDULLAHI = 0 today.     Plan:  - Continue follow up with endocrinology  - Continue selenium 200mcg daily  - ATs as needed  - Continue to avoid smoking    Rosemary Graff MD  Resident Physician, PGY-2  Department of Ophthalmology         Attending Physician Attestation:  Complete documentation of historical and exam elements from today's encounter can be found in the full encounter summary report (not reduplicated in this progress note).  I personally obtained the chief complaint(s) and history of present illness.  I confirmed and edited as necessary the review of systems, past medical/surgical history, family history, social history, and examination findings as documented by others; and I examined the patient myself.  I personally reviewed the relevant tests, images, and reports as documented above.  I formulated and edited as necessary the assessment and plan and discussed the findings and management plan with the patient  and family. I personally reviewed the ophthalmic test(s) associated with this encounter, agree with the interpretation(s) as documented by the resident/fellow, and have edited the corresponding report(s) as necessary.  - Raphael Aguero MD

## 2023-02-27 LAB — TSI SER-ACNC: <1 TSI INDEX

## 2023-05-01 ENCOUNTER — OFFICE VISIT (OUTPATIENT)
Dept: FAMILY MEDICINE | Facility: CLINIC | Age: 73
End: 2023-05-01
Payer: COMMERCIAL

## 2023-05-01 VITALS
HEIGHT: 61 IN | WEIGHT: 189.8 LBS | SYSTOLIC BLOOD PRESSURE: 136 MMHG | DIASTOLIC BLOOD PRESSURE: 72 MMHG | RESPIRATION RATE: 16 BRPM | HEART RATE: 72 BPM | BODY MASS INDEX: 35.83 KG/M2 | TEMPERATURE: 97.7 F | OXYGEN SATURATION: 98 %

## 2023-05-01 DIAGNOSIS — G89.29 CHRONIC PAIN OF LEFT KNEE: ICD-10-CM

## 2023-05-01 DIAGNOSIS — N18.31 STAGE 3A CHRONIC KIDNEY DISEASE (H): ICD-10-CM

## 2023-05-01 DIAGNOSIS — E78.5 HYPERLIPIDEMIA, UNSPECIFIED HYPERLIPIDEMIA TYPE: ICD-10-CM

## 2023-05-01 DIAGNOSIS — E66.01 MORBID OBESITY (H): ICD-10-CM

## 2023-05-01 DIAGNOSIS — M25.562 CHRONIC PAIN OF LEFT KNEE: ICD-10-CM

## 2023-05-01 DIAGNOSIS — F41.1 GENERALIZED ANXIETY DISORDER: ICD-10-CM

## 2023-05-01 DIAGNOSIS — Z13.0 SCREENING FOR DEFICIENCY ANEMIA: ICD-10-CM

## 2023-05-01 DIAGNOSIS — R76.8 ANTI-TPO ANTIBODIES PRESENT: ICD-10-CM

## 2023-05-01 DIAGNOSIS — E06.3 HYPOTHYROIDISM DUE TO HASHIMOTO'S THYROIDITIS: ICD-10-CM

## 2023-05-01 DIAGNOSIS — Z01.818 PREOP GENERAL PHYSICAL EXAM: Primary | ICD-10-CM

## 2023-05-01 DIAGNOSIS — I10 BENIGN HYPERTENSION: ICD-10-CM

## 2023-05-01 LAB
CHOLEST SERPL-MCNC: 163 MG/DL
ERYTHROCYTE [DISTWIDTH] IN BLOOD BY AUTOMATED COUNT: 12.5 % (ref 10–15)
FERRITIN SERPL-MCNC: 187 NG/ML (ref 11–328)
HCT VFR BLD AUTO: 39.9 % (ref 35–47)
HDLC SERPL-MCNC: 57 MG/DL
HGB BLD-MCNC: 13 G/DL (ref 11.7–15.7)
LDLC SERPL CALC-MCNC: 83 MG/DL
MCH RBC QN AUTO: 29.2 PG (ref 26.5–33)
MCHC RBC AUTO-ENTMCNC: 32.6 G/DL (ref 31.5–36.5)
MCV RBC AUTO: 90 FL (ref 78–100)
NONHDLC SERPL-MCNC: 106 MG/DL
PLATELET # BLD AUTO: 212 10E3/UL (ref 150–450)
RBC # BLD AUTO: 4.45 10E6/UL (ref 3.8–5.2)
TRIGL SERPL-MCNC: 116 MG/DL
WBC # BLD AUTO: 6.7 10E3/UL (ref 4–11)

## 2023-05-01 PROCEDURE — 82728 ASSAY OF FERRITIN: CPT | Performed by: INTERNAL MEDICINE

## 2023-05-01 PROCEDURE — 85027 COMPLETE CBC AUTOMATED: CPT | Performed by: INTERNAL MEDICINE

## 2023-05-01 PROCEDURE — 80061 LIPID PANEL: CPT | Performed by: INTERNAL MEDICINE

## 2023-05-01 PROCEDURE — 99214 OFFICE O/P EST MOD 30 MIN: CPT | Performed by: INTERNAL MEDICINE

## 2023-05-01 PROCEDURE — 36415 COLL VENOUS BLD VENIPUNCTURE: CPT | Performed by: INTERNAL MEDICINE

## 2023-05-01 PROCEDURE — 93000 ELECTROCARDIOGRAM COMPLETE: CPT | Performed by: INTERNAL MEDICINE

## 2023-05-01 RX ORDER — LEVOTHYROXINE SODIUM 25 UG/1
37.5 TABLET ORAL DAILY
Qty: 135 TABLET | Refills: 3 | Status: SHIPPED | OUTPATIENT
Start: 2023-05-01 | End: 2024-04-03

## 2023-05-01 ASSESSMENT — PAIN SCALES - GENERAL: PAINLEVEL: MODERATE PAIN (5)

## 2023-05-01 NOTE — PATIENT INSTRUCTIONS
Avoid aspirin 7 days before the surgery. Avoid nonsteroidal anti-inflammatory pain medication like ibuprofen, Motrin, or Aleve 7 days before the surgery.  Tylenol is okay to use for pain.  Avoid any OTC multivitamins or herbal supplement 7 days before surgery   Resume after surgery     Hold morning dose of losartan on day of surgery      Follow up in 9/2023 for wellness visit   Seek sooner medical attention if there is any worsening of symptoms or problems.

## 2023-05-01 NOTE — RESULT ENCOUNTER NOTE
Jessica Miller    This is to inform you regarding your test result.    CBC result which includes white count Hemoglobin and  Platelet Counts is normal.   Your total cholesterol is normal.  HDL which is called good cholesterol is normal.  Your LDL cholesterol is normal.  This is often call bad cholesterol and high levels increase the risk for heart attacks and strokes.  Your triglycerides are normal.  Ferritin which is iron stores in the body is normal.        Sincerely,      Dr.Nasima Yimi MD,FACP

## 2023-05-01 NOTE — PROGRESS NOTES
[address], [address]  [facility] [address]  Phone: [phone]  Primary Provider: Merary Munson  Pre-op Performing Provider: MERARY MUNSON      PREOPERATIVE EVALUATION:  Today's date: 5/1/2023    Angela Story is a 72 year old female who presents for a preoperative evaluation.       View : No data to display.              Surgical Information:  Surgery/Procedure: Left knee arthroplasty  Surgery Location: [facility]  Surgeon: Dr. Conley  Surgery Date: 05/17/23  Time of Surgery: TBD  Where patient plans to recover: At home with family  Fax number for surgical facility: [phone]    Assessment & Plan     The proposed surgical procedure is considered INTERMEDIATE risk.    Angela was seen today for pre-op exam.    Diagnoses and all orders for this visit:    Preop general physical exam  -     CBC with platelets; Future  -     EKG 12-lead complete w/read - Clinics    No history of any anesthesia complications   Reports family history of anesthesia complications - sister (many years ago)    Chronic pain of left knee  Patient has been c/o soreness in her left knee. She is scheduled for left knee arthroplasty by Dr. Conley at [facility] on 05/17/2023.     Benign hypertension  On amlodipine and losartan   Hold losartan on the morning of the procedure. Resume afterwards.     Morbid obesity (H)  Educated patient about the importance of healthy diet and physical activities - she has  and is working on her activities.     Stage 3a chronic kidney disease (H)  Chronic and stable     Generalized anxiety disorder  On celexa     Hyperlipidemia, unspecified hyperlipidemia type  -     Lipid panel reflex to direct LDL Fasting; Future    Hypothyroidism due to Hashimoto's thyroiditis  -     levothyroxine (SYNTHROID/LEVOTHROID) 25 MCG tablet; Take 1.5 tablets (37.5 mcg) by mouth daily for thyroid  Follows endocrinology - reviewed the note    On  levothyroxine     Anti-TPO antibodies present  -     levothyroxine (SYNTHROID/LEVOTHROID) 25 MCG tablet; Take 1.5 tablets (37.5 mcg) by mouth daily for thyroid    Screening for deficiency anemia  -     Ferritin; Future    Other  Instructed the patient to avoid aspirin, multivitamins and herbal supplements and NSAIDs such as ibuprofen, motrin or aleve 7 days before the surgery. Resume them after procedure   Tylenol and topical gel/cream is okay to use for pain       - No identified additional risk factors other than previously addressed        Additional Medication Instructions:  Patient is to take all scheduled medications on the day of surgery EXCEPT for modifications listed below:  Avoid aspirin 7 days before the surgery. Avoid nonsteroidal anti-inflammatory pain medication like ibuprofen, Motrin, or Aleve 7 days before the surgery.  Tylenol is okay to use for pain.  Avoid any OTC multivitamins or herbal supplement 7 days before surgery   Resume after surgery    Hold morning dose of losartan on day of surgery and resume after that      RECOMMENDATION:  Patient is optimal for above procedure.              Subjective     HPI related to upcoming procedure: Angela Story is a 72 year old female who presents here for a preoperative evaluation. She has history of osteoarthritis and has been c/o soreness in her left knee. She is scheduled for left knee arthroplasty by Dr. Conley at [facility] on 05/17/2023.         4/25/2023    12:52 PM   Preop Questions   1. Have you ever had a heart attack or stroke? No   2. Have you ever had surgery on your heart or blood vessels, such as a stent placement, a coronary artery bypass, or surgery on an artery in your head, neck, heart, or legs? No   3. Do you have chest pain with activity? No   4. Do you have a history of  heart failure? No   5. Do you currently have a cold, bronchitis or symptoms of other infection? No   6. Do you have a cough, shortness of breath, or  wheezing? No   7. Do you or anyone in your family have previous history of blood clots? No   8. Do you or does anyone in your family have a serious bleeding problem such as prolonged bleeding following surgeries or cuts? No   9. Have you ever had problems with anemia or been told to take iron pills? No   10. Have you had any abnormal blood loss such as black, tarry or bloody stools, or abnormal vaginal bleeding? No   11. Have you ever had a blood transfusion? No   12. Are you willing to have a blood transfusion if it is medically needed before, during, or after your surgery? YES    13. Have you or any of your relatives ever had problems with anesthesia? YES - sister    14. Do you have sleep apnea, excessive snoring or daytime drowsiness? No   15. Do you have any artifical heart valves or other implanted medical devices like a pacemaker, defibrillator, or continuous glucose monitor? No   16. Do you have artificial joints? YES - right knee arthroplasty   17. Are you allergic to latex? No       Health Care Directive:  Patient has a Health Care Directive on file      Preoperative Review of :  Reviewed   [id number]}    Review of Systems  CONSTITUTIONAL: NEGATIVE for fever, chills, change in weight  INTEGUMENTARY/SKIN: NEGATIVE for worrisome rashes, moles or lesions  EYES: NEGATIVE for vision changes or irritation  ENT/MOUTH: NEGATIVE for ear, mouth and throat problems  RESP: NEGATIVE for significant cough or SOB  CV: NEGATIVE for chest pain, palpitations or peripheral edema  GI: NEGATIVE for nausea, abdominal pain, heartburn, or change in bowel habits  : NEGATIVE for frequency, dysuria, or hematuria  MUSCULOSKELETAL: NEGATIVE for significant arthralgias or myalgia  NEURO: NEGATIVE for weakness, dizziness or paresthesias  ENDOCRINE: NEGATIVE for temperature intolerance, skin/hair changes  HEME: NEGATIVE for bleeding problems  PSYCHIATRIC: NEGATIVE for changes in mood or affect    Patient Active Problem List     Diagnosis Date Noted     Status post right knee replacement 12/06/2022     Priority: Medium     Morbid obesity (H) 08/29/2022     Priority: Medium     Dermatochalasis of both upper eyelids 05/17/2021     Priority: Medium     Added automatically from request for surgery [id number]       Ptosis of both upper eyelids 05/17/2021     Priority: Medium     Added automatically from request for surgery [id number]       Hypothyroidism due to Hashimoto's thyroiditis 07/17/2020     Priority: Medium     Hypothyroidism, unspecified type 06/25/2019     Priority: Medium     Anti-TPO antibodies present 04/23/2019     Priority: Medium     Elevated TSH 01/23/2019     Priority: Medium     Non morbid obesity, unspecified obesity type 01/03/2017     Priority: Medium     IFG (impaired fasting glucose) 09/24/2016     Priority: Medium     Thyroid nodule 12/31/2015     Priority: Medium     Generalized anxiety disorder 05/19/2014     Priority: Medium     Bilateral bunions 05/12/2014     Priority: Medium     Right shoulder pain 04/22/2014     Priority: Medium     Osteoarthritis of acromioclavicular joint 04/22/2014     Priority: Medium     Esophageal reflux 04/21/2014     Priority: Medium     Benign hypertension 03/07/2013     Priority: Medium     Advanced directives, counseling/discussion 12/20/2011     Priority: Medium     Advance Care Planning:   Receipt of ACP document:  Received: Health Care Directive which was witnessed or notarized on 7/5/2013.  Document not previously scanned.  Validation form completed and sent with document to be scanned.  .  Confirmed/documented designated decision maker(s). See permanent comments section of demographics in clinical tab. View document(s) and details by clicking on code status.   Added by Ernestine Estevez on 7/18/2013.             CKD (chronic kidney disease) stage 3, GFR 30-59 ml/min (H)      Priority: Medium     HYPERLIPIDEMIA LDL GOAL <100 05/09/2010     Priority: Medium      Past Medical History:    Diagnosis Date     Bilateral bunions 2014     CKD (chronic kidney disease) stage 3, GFR 30-59 ml/min (H)      Gastro-oesophageal reflux disease      Hyperlipidemia LDL goal <100 2010     Hypertension goal BP (blood pressure) < 140/90 2013     IFG (impaired fasting glucose) 2016     Osteoarthritis of acromioclavicular joint      PONV (postoperative nausea and vomiting)      Thyroid nodule 2015     Past Surgical History:   Procedure Laterality Date     BIOPSY  1985    Breast lump     BREAST SURGERY      See above     BUNIONECTOMY  2014    Procedure: BUNIONECTOMY;  Surgeon: Kevin Rodas DPM;  Location: [facility] OR     COLONOSCOPY  ; 2018     COMBINED REPAIR PTOSIS WITH BLEPHAROPLASTY Bilateral 2021    Procedure: Bilateral upper eyelid blepharoplasty wtih ptosis repair;  Surgeon: Gregor Harris MD;  Location: [facility] OR     EXCISE MASS BACK  2014    Procedure: EXCISE MASS BACK;  Surgeon: Chaim Lacy MD;  Location: [facility] OR     EYE SURGERY      ?     HC DILATION/CURETTAGE DIAG/THER NON OB  1982    D & C     REPLACEMENT TOTAL KNEE       surgery for ptosis of both eyelids       TONSILLECTOMY  1966    tonsillectomy     ZZC  DELIVERY ONLY  1980    , Low Cervical     ZZ NONSPECIFIC PROCEDURE  1986    lumpectomy, lt     ZZC NONSPECIFIC PROCEDURE      ob      ZZC NONSPECIFIC PROCEDURE  1983    Vaginal delivery x 1     ZZHC ARTHROTOMY W/OPEN MENISCUS REPAIR      lt knee     Current Outpatient Medications   Medication Sig Dispense Refill     amLODIPine (NORVASC) 2.5 MG tablet Take 1 tablet (2.5 mg) by mouth 2 times daily for Blood Pressure 180 tablet 3     Blood Pressure Monitoring KIT 1 kit daily 1 kit 0     cholecalciferol 25 MCG (1000 UT) TABS Take 1,000 Units by mouth       citalopram (CELEXA) 20 MG tablet Take 1 tablet (20 mg) by mouth daily 90 tablet 3     HYDROXYZINE HCL PO Take by mouth as needed       levothyroxine (SYNTHROID/LEVOTHROID)  25 MCG tablet Take 1.5 tablets (37.5 mcg) by mouth daily for thyroid 135 tablet 3     losartan (COZAAR) 50 MG tablet Take 1 tablet (50 mg) by mouth 2 times daily for Blood Pressure 180 tablet 3     nystatin (MYCOSTATIN) 722662 UNIT/GM external powder Apply topically 2 times daily 60 g 1     omeprazole (PRILOSEC) 20 MG DR capsule Take 1 capsule (20 mg) by mouth daily as needed 90 capsule 3     polyethylene glycol (MIRALAX) 17 GM/Dose powder Take 1 capful by mouth as needed       pravastatin (PRAVACHOL) 20 MG tablet Take 1 tablet (20 mg) by mouth daily for cholestrol 90 tablet 3     scopolamine (TRANSDERM) 1 MG/3DAYS 72 hr patch Apply 1 patch to hairless area behind one ear at least 4 hours before travel.  Remove old patch and change every 3 days (72 hours). 4 patch 11     Selenium 200 MCG TABS tablet Take 200 mcg by mouth daily       SENNA PO        vitamin D3 (CHOLECALCIFEROL) 50 mcg (2000 units) tablet Take 1 tablet (50 mcg) by mouth daily         Allergies   Allergen Reactions     Norco [Hydrocodone-Acetaminophen] Dizziness and Nausea and Vomiting     Eggs Nausea and Vomiting and Diarrhea     Guaifenesin Hives     Guaifenesin Er Hives     Lisinopril Cough     Penicillins Rash     Sulfa Antibiotics Hives     Sulfites Nausea and Vomiting and Diarrhea     Sulfametrole Rash        Social History     Tobacco Use     Smoking status: Never     Smokeless tobacco: Never   Vaping Use     Vaping status: Never Used     Passive vaping exposure: Yes   Substance Use Topics     Alcohol use: Yes     Comment: 1 per week     Family History   Problem Relation Age of Onset     Lipids Mother      Hypertension Mother      Hyperlipidemia Mother      Cancer Father         pancreatic     Alcohol/Drug Father         etoh     Other Cancer Father         Pancreas     Depression Father      Substance Abuse Father         Alcohol     Heart Disease Maternal Grandfather      Coronary Artery Disease Maternal Grandfather      Cerebrovascular  "Disease Paternal Grandfather      Psychotic Disorder Sister         bi polar     Hypertension Sister      Hyperlipidemia Sister      Neurologic Disorder Sister         ms     Hypertension Sister      Mental Illness Sister         BiPolar     Anesthesia Reaction Sister      Thyroid Disease Sister      Obesity Sister      Hyperlipidemia Sister      Neurologic Disorder Sister         cidp     Cerebrovascular Disease Paternal Grandmother      Hypertension Brother      Obesity Brother      Hyperlipidemia Brother      History   Drug Use Unknown         Objective     /72 (BP Location: Right arm, Patient Position: Sitting, Cuff Size: Adult Regular)   Pulse 72   Temp 97.7  F (36.5  C)   Resp 16   Ht 1.543 m (5' 0.75\")   Wt 86.1 kg (189 lb 12.8 oz)   LMP 07/23/2004   SpO2 98%   BMI 36.16 kg/m      Physical Exam    GENERAL APPEARANCE: healthy, alert and no distress     EYES: EOMI, PERRL     HENT: ear canals and TM's normal and nose and mouth without ulcers or lesions     NECK: no adenopathy, no asymmetry, masses, or scars and thyroid normal to palpation     RESP: lungs clear to auscultation - no rales, rhonchi or wheezes     CV: regular rates and rhythm, normal S1 S2, no S3 or S4 and no murmur, click or rub     ABDOMEN:  soft, nontender, no HSM or masses and bowel sounds normal     MS: extremities normal- no gross deformities noted, no evidence of inflammation in joints, FROM in all extremities.     SKIN: no suspicious lesions or rashes     NEURO: Normal strength and tone, sensory exam grossly normal, mentation intact and speech normal     PSYCH: mentation appears normal. and affect normal/bright     LYMPHATICS: No cervical adenopathy    Recent Labs   Lab Test 02/06/23  1115 11/23/22  1503 11/14/22  0809   HGB 13.6  --  14.2     --  220    138 141   POTASSIUM 4.3 4.0 4.6   CR 0.98* 0.86 1.00*        Diagnostics:  Recent Results (from the past 24 hour(s))   CBC with platelets    Collection Time: " 05/01/23  8:19 AM   Result Value Ref Range    WBC Count 6.7 4.0 - 11.0 10e3/uL    RBC Count 4.45 3.80 - 5.20 10e6/uL    Hemoglobin 13.0 11.7 - 15.7 g/dL    Hematocrit 39.9 35.0 - 47.0 %    MCV 90 78 - 100 fL    MCH 29.2 26.5 - 33.0 pg    MCHC 32.6 31.5 - 36.5 g/dL    RDW 12.5 10.0 - 15.0 %    Platelet Count 212 150 - 450 10e3/uL   Ferritin    Collection Time: 05/01/23  8:19 AM   Result Value Ref Range    Ferritin 187 11 - 328 ng/mL   Lipid panel reflex to direct LDL Fasting    Collection Time: 05/01/23  8:19 AM   Result Value Ref Range    Cholesterol 163 <200 mg/dL    Triglycerides 116 <150 mg/dL    Direct Measure HDL 57 >=50 mg/dL    LDL Cholesterol Calculated 83 <=100 mg/dL    Non HDL Cholesterol 106 <130 mg/dL      EKG: appears normal, NSR, normal axis, normal intervals, no acute ST/T changes c/w ischemia, no LVH by voltage criteria    Revised Cardiac Risk Index (RCRI):  The patient has the following serious cardiovascular risks for perioperative complications:   - No serious cardiac risks = 0 points     RCRI Interpretation: 1 point: Class II (low risk - 0.9% complication rate)           Signed Electronically by: Merary Geller MD  Copy of this evaluation report is provided to requesting physician.    This document serves as a record of the services and decisions personally performed and made by Dr. Geller. It was created on her behalf by Trey Marrufo, a trained medical scribe. The creation of this document is based the provider's statements to the medical scribe.

## 2023-05-08 ENCOUNTER — NURSE TRIAGE (OUTPATIENT)
Dept: FAMILY MEDICINE | Facility: CLINIC | Age: 73
End: 2023-05-08
Payer: COMMERCIAL

## 2023-05-08 NOTE — TELEPHONE ENCOUNTER
Pt reports vaginal discharge with some foul odor. Denies vaginal itching, abdominal pain or UTI symptoms. She also reports some vaginal bleeding yesterday but not today. Pt denies blood clots, dizziness or weakness. Pt is not taking abx. Triage advised OV. Future appt was scheduled with Diane 05/09/2023 to evaluate symptoms. Please advice if ok to keep appt with Diane or provider would recommend gynecology referral.      Pt only needs a call back if appt tomorrow with Diane is not appropriate.     Reason for Disposition    Patient wants to be seen    Additional Information    Negative: Pain or burning with passing urine (urination) is main symptom    Negative: Pregnant with vaginal discharge    Negative: SEVERE abdominal pain (e.g., excruciating)    Negative: Patient sounds very sick or weak to the triager    Negative: Yellow or green vaginal discharge and has a fever    Negative: Constant abdominal pain lasting > 2 hours    Negative: Mild lower abdominal pain comes and goes (cramps) that lasts > 24 hours    Negative: Genital area looks infected (e.g., draining sore, spreading redness)    Negative: Rash is tiny water blisters (3 or more)    Protocols used: VAGINAL [id number]

## 2023-05-08 NOTE — TELEPHONE ENCOUNTER
Called patient regarding PCP message below. Left message to keep appointment for tomorrow. Gave clinic number to call back with questions/concerns.    Irasema Luu RN on 5/8/2023 at 4:27 PM

## 2023-05-09 ENCOUNTER — OFFICE VISIT (OUTPATIENT)
Dept: FAMILY MEDICINE | Facility: CLINIC | Age: 73
End: 2023-05-09
Payer: COMMERCIAL

## 2023-05-09 VITALS
RESPIRATION RATE: 17 BRPM | HEART RATE: 81 BPM | WEIGHT: 189.5 LBS | BODY MASS INDEX: 35.78 KG/M2 | OXYGEN SATURATION: 98 % | SYSTOLIC BLOOD PRESSURE: 126 MMHG | TEMPERATURE: 97.3 F | HEIGHT: 61 IN | DIASTOLIC BLOOD PRESSURE: 82 MMHG

## 2023-05-09 DIAGNOSIS — B37.31 YEAST INFECTION OF THE VAGINA: Primary | ICD-10-CM

## 2023-05-09 DIAGNOSIS — N95.0 POST-MENOPAUSAL BLEEDING: Primary | ICD-10-CM

## 2023-05-09 LAB
CLUE CELLS: ABNORMAL
TRICHOMONAS, WET PREP: ABNORMAL
WBC'S/HIGH POWER FIELD, WET PREP: ABNORMAL
YEAST, WET PREP: PRESENT

## 2023-05-09 PROCEDURE — 99213 OFFICE O/P EST LOW 20 MIN: CPT | Performed by: PHYSICIAN ASSISTANT

## 2023-05-09 PROCEDURE — 87210 SMEAR WET MOUNT SALINE/INK: CPT | Performed by: PHYSICIAN ASSISTANT

## 2023-05-09 RX ORDER — FLUCONAZOLE 150 MG/1
150 TABLET ORAL ONCE
Qty: 1 TABLET | Refills: 0 | Status: SHIPPED | OUTPATIENT
Start: 2023-05-09 | End: 2023-05-09

## 2023-05-09 ASSESSMENT — PAIN SCALES - GENERAL: PAINLEVEL: NO PAIN (0)

## 2023-05-09 NOTE — RESULT ENCOUNTER NOTE
I called patient with results. Wet prep positive for yeast.  Patient prefers [facility] on [address].  Will call in Rx for diflucan.

## 2023-05-09 NOTE — PROGRESS NOTES
"Assessment and Plan:     (N95.0) Post-menopausal bleeding  (primary encounter diagnosis)  Comment: spotting, hemodynamically stable, onset 2 days ago, has had some discharge as well,  exam limited by discomfort  Plan: US Pelvic Complete with Transvaginal, Ob/Gyn         Referral, Wet prep - Clinic Collect  Discussed when to be seen promptly (more than a pad an hour, pain, dizziness)  Follow-up w/ob/gyn      Carmen Pan PA-C         Subjective   Angela is a 72 year old, presenting for the following health issues:  Vaginal Problem         View : No data to display.              History of Present Illness       Reason for visit:  Vaginal discharge/bleeding post menopause  Symptom onset:  3-7 days ago  Symptoms include:  Vaginal dischage bleeding-- change in odor  Symptom intensity:  Mild  Symptom progression:  Staying the same  Had these symptoms before:  No  What makes it worse:  No  What makes it better:  No    She eats 4 or more servings of fruits and vegetables daily.She consumes 0 sweetened beverage(s) daily.She exercises with enough effort to increase her heart rate 30 to 60 minutes per day.  She exercises with enough effort to increase her heart rate 6 days per week.   She is taking medications regularly.     Angela is here for vaginal symptoms  She noticed more discharge 4 days ago--yellow/brown, small amount   She has also noticed a different odor   Two days ago she noticed some bloody discharge, just spotting, on liner lasted half the day  She denies abdominal pain  She has felt a little bloated  She denies vaginal itching, dysuria, irritation or lesions  She is currently sexually active, one monogamous partner, they do not have penetrative sex    She still has a uterus     Review of Systems         Objective    /82 (BP Location: Left arm, Patient Position: Sitting, Cuff Size: Adult Large)   Pulse 81   Temp 97.3  F (36.3  C) (Temporal)   Resp 17   Ht 1.543 m (5' 0.75\")   Wt 86 kg (189 " lb 8 oz)   LMP 07/23/2004   SpO2 98%   BMI 36.10 kg/m    Body mass index is 36.1 kg/m .     Physical Exam     GENERAL: healthy, alert and no distress  ENT: mmm, op clear   RESP: lungs clear to auscultation - no rales, no rhonchi, no wheezes  CV: regular rates and rhythm, normal S1 S2, no S3 or S4 and no murmur, no click or rub   ABD: soft, NT, +BS, no masses   : normal female external genitalia, normal urethral meatus, vaginal mucosa pink, moist, well rugated,pain with speculum exam, cervix not fully visualized, no lesions, small amount white yellow discharge, no obvious blood, bimanual exam not performed 2/2 pt could not tolerate     MS: extremities- no gross deformities noted, no edema  SKIN: no suspicious lesions, no rashes

## 2023-05-11 ENCOUNTER — OFFICE VISIT (OUTPATIENT)
Dept: OBGYN | Facility: CLINIC | Age: 73
End: 2023-05-11
Payer: COMMERCIAL

## 2023-05-11 VITALS
BODY MASS INDEX: 35.83 KG/M2 | WEIGHT: 189.8 LBS | HEIGHT: 61 IN | DIASTOLIC BLOOD PRESSURE: 80 MMHG | SYSTOLIC BLOOD PRESSURE: 122 MMHG

## 2023-05-11 DIAGNOSIS — N95.0 PMB (POSTMENOPAUSAL BLEEDING): Primary | ICD-10-CM

## 2023-05-11 PROCEDURE — 99203 OFFICE O/P NEW LOW 30 MIN: CPT | Mod: 25 | Performed by: OBSTETRICS & GYNECOLOGY

## 2023-05-11 PROCEDURE — 88342 IMHCHEM/IMCYTCHM 1ST ANTB: CPT | Performed by: PATHOLOGY

## 2023-05-11 PROCEDURE — 88305 TISSUE EXAM BY PATHOLOGIST: CPT | Performed by: PATHOLOGY

## 2023-05-11 PROCEDURE — 58100 BIOPSY OF UTERUS LINING: CPT | Performed by: OBSTETRICS & GYNECOLOGY

## 2023-05-11 ASSESSMENT — ANXIETY QUESTIONNAIRES
2. NOT BEING ABLE TO STOP OR CONTROL WORRYING: NOT AT ALL
6. BECOMING EASILY ANNOYED OR IRRITABLE: NOT AT ALL
IF YOU CHECKED OFF ANY PROBLEMS ON THIS QUESTIONNAIRE, HOW DIFFICULT HAVE THESE PROBLEMS MADE IT FOR YOU TO DO YOUR WORK, TAKE CARE OF THINGS AT HOME, OR GET ALONG WITH OTHER PEOPLE: NOT DIFFICULT AT ALL
3. WORRYING TOO MUCH ABOUT DIFFERENT THINGS: NOT AT ALL
GAD7 TOTAL SCORE: 1
1. FEELING NERVOUS, ANXIOUS, OR ON EDGE: SEVERAL DAYS
5. BEING SO RESTLESS THAT IT IS HARD TO SIT STILL: NOT AT ALL
7. FEELING AFRAID AS IF SOMETHING AWFUL MIGHT HAPPEN: NOT AT ALL
GAD7 TOTAL SCORE: 1

## 2023-05-11 ASSESSMENT — PATIENT HEALTH QUESTIONNAIRE - PHQ9
SUM OF ALL RESPONSES TO PHQ QUESTIONS 1-9: 3
5. POOR APPETITE OR OVEREATING: NOT AT ALL

## 2023-05-11 NOTE — LETTER
2023         RE: Angela Story  [address]  [place] [facility] [place] [address]        Dear Colleague,    Thank you for referring your patient, Angela Story, to the [facility] [place]. Please see a copy of my visit note below.      SUBJECTIVE:                                                   Angela Story is a 72 year old female who presents to clinic today for the following health issue(s):  Patient presents with:  postmenopausal bleeding        HPI:  REferred by Dr. Geller for PMB   Pelvic ultrasound scheduled tomorrow.  Has knee replacement schedule 23    Started having VB 4d ago. Preceeded by odor and discharge. Bleeding was small to medium episode. Had yeast infection this week, started treatement. Bleeding has decreased.   LMP about 20yrs ago, [age]yo.  Has hx of regular periods.     SA, non penetration.      Completed review of PMH, SocHx, SurHx, FHx, medications, and care everywhere reviewed. Epic updated.      Patient's last menstrual period was 2004..     Patient is not sexually active, .  Using menopause for contraception.    reports that she has never smoked. She has never used smokeless tobacco.    STD testing offered?  Declined - not sexually active    Health maintenance updated:  See Care Gaps     Today's PHQ-2 Score:       2023     2:30 PM   PHQ-2 (  [employer])   Q1: Little interest or pleasure in doing things 0   Q2: Feeling down, depressed or hopeless 0   PHQ-2 Score 0     Today's PHQ-9 Score:       2023     2:30 PM   PHQ-9 SCORE   PHQ-9 Total Score 3     Today's CICI-7 Score:       2023     2:30 PM   CICI-7 SCORE   Total Score 1       Problem list and histories reviewed & adjusted, as indicated.  Additional history: as documented.    Patient Active Problem List   Diagnosis     HYPERLIPIDEMIA LDL GOAL <100     CKD (chronic kidney disease) stage 3, GFR 30-59 ml/min (H)     Advanced directives, counseling/discussion     Benign hypertension      Esophageal reflux     Right shoulder pain     Osteoarthritis of acromioclavicular joint     Bilateral bunions     Generalized anxiety disorder     Thyroid nodule     IFG (impaired fasting glucose)     Non morbid obesity, unspecified obesity type     Elevated TSH     Anti-TPO antibodies present     Hypothyroidism, unspecified type     Hypothyroidism due to Hashimoto's thyroiditis     Dermatochalasis of both upper eyelids     Ptosis of both upper eyelids     Morbid obesity (H)     Status post right knee replacement     Past Surgical History:   Procedure Laterality Date     BIOPSY      Breast lump     BREAST SURGERY      See above     BUNIONECTOMY  2014    Procedure: BUNIONECTOMY;  Surgeon: eKvin Rodas DPM;  Location: [facility] OR     COLONOSCOPY  ; 2018     COMBINED REPAIR PTOSIS WITH BLEPHAROPLASTY Bilateral 2021    Procedure: Bilateral upper eyelid blepharoplasty wtih ptosis repair;  Surgeon: Gregor Harris MD;  Location: [facility] OR     EXCISE MASS BACK  2014    Procedure: EXCISE MASS BACK;  Surgeon: Chaim Lacy MD;  Location: [facility] OR     EYE SURGERY      ?     HC DILATION/CURETTAGE DIAG/THER NON OB  1982    D & C     REPLACEMENT TOTAL KNEE       surgery for ptosis of both eyelids       TONSILLECTOMY  1966    tonsillectomy     ZZC  DELIVERY ONLY  1980    , Low Cervical     ZZC NONSPECIFIC PROCEDURE  1986    lumpectomy, lt     ZZC NONSPECIFIC PROCEDURE      ob      ZZC NONSPECIFIC PROCEDURE  1983    Vaginal delivery x 1     ZZHC ARTHROTOMY W/OPEN MENISCUS REPAIR  2007    lt knee      Social History     Tobacco Use     Smoking status: Never     Smokeless tobacco: Never   Vaping Use     Vaping status: Never Used     Passive vaping exposure: Yes   Substance Use Topics     Alcohol use: Yes     Comment: 1 per week      Problem (# of Occurrences) Relation (Name,Age of Onset)    Substance Abuse (1) Father (Sanjuana So): Alcohol    Mental Illness (1) Sister  (Rani Castro): BiPolar    Alcohol/Drug (1) Father (Sanjuana So): etoh    Anesthesia Reaction (1) Sister (Rani Castro)    Cancer (1) Father (Sanjuana So): pancreatic    Depression (1) Father (Sanjuana So)    Heart Disease (1) Maternal Grandfather (Sky Cleaning)    Hypertension (4) Mother (Samira So), Sister (Aida So), Sister (Rani Castro), Brother (Brock Archers)    Lipids (1) Mother (Samira So)    Neurologic Disorder (2) Sister (Rani Castro): ms, Sister: cidp    Psychotic Disorder (1) Sister (Aida So): bi polar    Cerebrovascular Disease (2) Paternal Grandfather (Jorje So), Paternal Grandmother (Dannielle So)    Thyroid Disease (1) Sister (Rani Castro)    Obesity (2) Sister (Rani Castro), Brother (Brock Archers)    Other Cancer (1) Father (Sanjuana So): Pancreas    Hyperlipidemia (4) Mother (Samira So), Sister (Aida So), Sister (Rani Castro), Brother (Brock Arhcers)    Coronary Artery Disease (1) Maternal Grandfather (Sky Cleaning)            Current Outpatient Medications   Medication Sig     amLODIPine (NORVASC) 2.5 MG tablet Take 1 tablet (2.5 mg) by mouth 2 times daily for Blood Pressure     Blood Pressure Monitoring KIT 1 kit daily     cholecalciferol 25 MCG (1000 UT) TABS Take 1,000 Units by mouth     citalopram (CELEXA) 20 MG tablet Take 1 tablet (20 mg) by mouth daily     HYDROXYZINE HCL PO Take by mouth as needed     levothyroxine (SYNTHROID/LEVOTHROID) 25 MCG tablet Take 1.5 tablets (37.5 mcg) by mouth daily for thyroid     losartan (COZAAR) 50 MG tablet Take 1 tablet (50 mg) by mouth 2 times daily for Blood Pressure     nystatin (MYCOSTATIN) 795117 UNIT/GM external powder Apply topically 2 times daily     omeprazole (PRILOSEC) 20 MG DR capsule Take 1 capsule (20 mg) by mouth daily as needed     polyethylene glycol (MIRALAX) 17 GM/Dose powder Take 1 capful by mouth as needed     pravastatin (PRAVACHOL) 20 MG tablet Take 1 tablet (20 mg) by mouth daily for cholestrol      "scopolamine (TRANSDERM) 1 MG/3DAYS 72 hr patch Apply 1 patch to hairless area behind one ear at least 4 hours before travel.  Remove old patch and change every 3 days (72 hours).     Selenium 200 MCG TABS tablet Take 200 mcg by mouth daily     SENNA PO      vitamin D3 (CHOLECALCIFEROL) 50 mcg (2000 units) tablet Take 1 tablet (50 mcg) by mouth daily     No current facility-administered medications for this visit.     Allergies   Allergen Reactions     Norco [Hydrocodone-Acetaminophen] Dizziness and Nausea and Vomiting     Eggs Nausea and Vomiting and Diarrhea     Guaifenesin Hives     Guaifenesin Er Hives     Lisinopril Cough     Penicillins Rash     Sulfa Antibiotics Hives     Sulfites Nausea and Vomiting and Diarrhea     Sulfametrole Rash       ROS:  Genitourinary: Spotting and Postmenopausal bleeding        OBJECTIVE:     /80   Ht 1.543 m (5' 0.75\")   Wt 86.1 kg (189 lb 12.8 oz)   LMP 07/23/2004   BMI 36.16 kg/m    Body mass index is 36.16 kg/m .    Exam:  Constitutional:  Appearance: Well nourished, well developed alert, in no acute distress  Neurologic:  Mental Status:  Oriented X3.  Normal strength and tone, sensory exam grossly normal, mentation intact and speech normal.    Psychiatric:  Mentation appears normal and affect normal/bright.  Pelvic Exam:  External Genitalia:     Normal appearance for age, no discharge present, no tenderness present, no inflammatory lesions present, color normal  Vagina:     Diffuse petichiae. Normal vaginal vault without central or paravaginal defects, ATROPHIC  Bladder:     Nontender to palpation  Urethra:   Urethral Body:  Urethra palpation normal, urethra structural support normal   Urethral Meatus:  No erythema or lesions present  Cervix:     Appears atrophic with petichiae, no lesions present, nontender to palpation, no bleeding present  Uterus:     Nontender to palpation, no masses present, position anteflexed, mobility: normal  Adnexa:     No adnexal tenderness " present, no adnexal masses present  Perineum:     Perineum within normal limits, no evidence of trauma, no rashes or skin lesions present  Inguinal Lymph Nodes:     No lymphadenopathy present       ASSESSMENT/PLAN:                                                        ICD-10-CM    1. PMB (postmenopausal bleeding)  N95.0 Surgical pathology exam     ENDOMETRIAL BIOPSY W/O CERVICAL DILATION            Acute postmenopausal bleeding.  Has not had an ultrasound yet, this is scheduled for tomorrow.  Discussed how this information is used.  Discussed awaiting ultrasound versus endometrial biopsy today however patient prefers to do the biopsy today.  Endometrial biopsy performed without issues.  Very small sample was obtained, discussed this may be due to the fact that her lining is very thin, we do not know at this point.  Will await ultrasound results and the results of the tissue sample that was obtained.  We will determine need for any further steps after this.  Vaginal tissue/vault very atrophic, friable.  May consider vaginal estrogen trial, especially if ongoing bleeding.  Pt given opportunity to ask questions, these were answered to her satisfaction, she verbalized understanding to and agreement with the plan.       Puja Banks Masters, [facility] [place]      INDICATIONS:                                                    Is a pregnancy test required: No.  Was a consent obtained?  Yes    Having endometrial biopsy for post-menopausal bleeding    Today's PHQ-2 Score:       5/11/2023     2:30 PM   PHQ-2 ( 1999 [employer])   Q1: Little interest or pleasure in doing things 0   Q2: Feeling down, depressed or hopeless 0   PHQ-2 Score 0       PROCEDURE;                                                      A speculum was placed in the vagina and cervix prepped with betadine. A tenaculum was attached to the cervix. A small plastic 5 mm Pipelle syringe curette was inserted into the cervical canal. The  uterus was sounded to 6.5 cm's. A vigorous four quadrant biopsy was performed, removing amount scant  of tissue. The speculum was removed. This tissue was placed in Formalin and sent to pathology.    The patient tolerated the procedure  well and she reported there was  cramping.      POST PROCEDURE;                                                      There  was no cramping at the time of discharge. She  tolerated the procedure well. There were no complications. Patient was discharged in stable condition.    Patient advised to call the clinic if severe pelvic pain, fever or heavy bleeding.    Puja Mills, DO    Again, thank you for allowing me to participate in the care of your patient.        Sincerely,        Puja Mills, DO

## 2023-05-11 NOTE — PROGRESS NOTES
SUBJECTIVE:                                                   Angela Story is a 72 year old female who presents to clinic today for the following health issue(s):  Patient presents with:  postmenopausal bleeding        HPI:  REferred by Dr. Geller for PMB   Pelvic ultrasound scheduled tomorrow.  Has knee replacement schedule 23    Started having VB 4d ago. Preceeded by odor and discharge. Bleeding was small to medium episode. Had yeast infection this week, started treatement. Bleeding has decreased.   LMP about 20yrs ago, [age]yo.  Has hx of regular periods.     SA, non penetration.      Completed review of PMH, SocHx, SurHx, FHx, medications, and care everywhere reviewed. Epic updated.      Patient's last menstrual period was 2004..     Patient is not sexually active, .  Using menopause for contraception.    reports that she has never smoked. She has never used smokeless tobacco.    STD testing offered?  Declined - not sexually active    Health maintenance updated:  See Care Gaps     Today's PHQ-2 Score:       2023     2:30 PM   PHQ-2 (  [employer])   Q1: Little interest or pleasure in doing things 0   Q2: Feeling down, depressed or hopeless 0   PHQ-2 Score 0     Today's PHQ-9 Score:       2023     2:30 PM   PHQ-9 SCORE   PHQ-9 Total Score 3     Today's CICI-7 Score:       2023     2:30 PM   CICI-7 SCORE   Total Score 1       Problem list and histories reviewed & adjusted, as indicated.  Additional history: as documented.    Patient Active Problem List   Diagnosis     HYPERLIPIDEMIA LDL GOAL <100     CKD (chronic kidney disease) stage 3, GFR 30-59 ml/min (H)     Advanced directives, counseling/discussion     Benign hypertension     Esophageal reflux     Right shoulder pain     Osteoarthritis of acromioclavicular joint     Bilateral bunions     Generalized anxiety disorder     Thyroid nodule     IFG (impaired fasting glucose)     Non morbid obesity, unspecified obesity type      Elevated TSH     Anti-TPO antibodies present     Hypothyroidism, unspecified type     Hypothyroidism due to Hashimoto's thyroiditis     Dermatochalasis of both upper eyelids     Ptosis of both upper eyelids     Morbid obesity (H)     Status post right knee replacement     Past Surgical History:   Procedure Laterality Date     BIOPSY  1985    Breast lump     BREAST SURGERY      See above     BUNIONECTOMY  2014    Procedure: BUNIONECTOMY;  Surgeon: Kevin Rodas DPM;  Location: [facility] OR     COLONOSCOPY  ; 2018     COMBINED REPAIR PTOSIS WITH BLEPHAROPLASTY Bilateral 2021    Procedure: Bilateral upper eyelid blepharoplasty wtih ptosis repair;  Surgeon: Gregor Harris MD;  Location: [facility] OR     EXCISE MASS BACK  2014    Procedure: EXCISE MASS BACK;  Surgeon: Chaim Lacy MD;  Location: [facility] OR     EYE SURGERY      ?     HC DILATION/CURETTAGE DIAG/THER NON OB  1982    D & C     REPLACEMENT TOTAL KNEE       surgery for ptosis of both eyelids       TONSILLECTOMY  1966    tonsillectomy     ZZC  DELIVERY ONLY      , Low Cervical     ZZC NONSPECIFIC PROCEDURE  1986    lumpectomy, lt     ZZC NONSPECIFIC PROCEDURE      ob      ZZC NONSPECIFIC PROCEDURE  1983    Vaginal delivery x 1     ZZHC ARTHROTOMY W/OPEN MENISCUS REPAIR  2007    lt knee      Social History     Tobacco Use     Smoking status: Never     Smokeless tobacco: Never   Vaping Use     Vaping status: Never Used     Passive vaping exposure: Yes   Substance Use Topics     Alcohol use: Yes     Comment: 1 per week      Problem (# of Occurrences) Relation (Name,Age of Onset)    Substance Abuse (1) Father (Sanjuana So): Alcohol    Mental Illness (1) Sister (Rani Castro): BiPolar    Alcohol/Drug (1) Father (Sanjuana So): etoh    Anesthesia Reaction (1) Sister (Rani Castro)    Cancer (1) Father (Sanjuana So): pancreatic    Depression (1) Father (Sanjuana So)    Heart Disease (1) Maternal  Grandfather (Sky Cleaning)    Hypertension (4) Mother (Samira So), Sister (Aida So), Sister (Rani Castro), Brother (Brock Archers)    Lipids (1) Mother (Samira So)    Neurologic Disorder (2) Sister (Rani Castro): ms, Sister: cidp    Psychotic Disorder (1) Sister (Aida So): bi polar    Cerebrovascular Disease (2) Paternal Grandfather (Jorje So), Paternal Grandmother (Dannielle So)    Thyroid Disease (1) Sister (Rani Castro)    Obesity (2) Sister (Rani Castro), Brother (Brock Archers)    Other Cancer (1) Father (Sanjuana So): Pancreas    Hyperlipidemia (4) Mother (Samira So), Sister (Aida So), Sister (Rani Castro), Brother (Brock Archers)    Coronary Artery Disease (1) Maternal Grandfather (Sky Cleaning)            Current Outpatient Medications   Medication Sig     amLODIPine (NORVASC) 2.5 MG tablet Take 1 tablet (2.5 mg) by mouth 2 times daily for Blood Pressure     Blood Pressure Monitoring KIT 1 kit daily     cholecalciferol 25 MCG (1000 UT) TABS Take 1,000 Units by mouth     citalopram (CELEXA) 20 MG tablet Take 1 tablet (20 mg) by mouth daily     HYDROXYZINE HCL PO Take by mouth as needed     levothyroxine (SYNTHROID/LEVOTHROID) 25 MCG tablet Take 1.5 tablets (37.5 mcg) by mouth daily for thyroid     losartan (COZAAR) 50 MG tablet Take 1 tablet (50 mg) by mouth 2 times daily for Blood Pressure     nystatin (MYCOSTATIN) 266938 UNIT/GM external powder Apply topically 2 times daily     omeprazole (PRILOSEC) 20 MG DR capsule Take 1 capsule (20 mg) by mouth daily as needed     polyethylene glycol (MIRALAX) 17 GM/Dose powder Take 1 capful by mouth as needed     pravastatin (PRAVACHOL) 20 MG tablet Take 1 tablet (20 mg) by mouth daily for cholestrol     scopolamine (TRANSDERM) 1 MG/3DAYS 72 hr patch Apply 1 patch to hairless area behind one ear at least 4 hours before travel.  Remove old patch and change every 3 days (72 hours).     Selenium 200 MCG TABS tablet Take 200 mcg by mouth daily     SENNA PO       "vitamin D3 (CHOLECALCIFEROL) 50 mcg (2000 units) tablet Take 1 tablet (50 mcg) by mouth daily     No current facility-administered medications for this visit.     Allergies   Allergen Reactions     Norco [Hydrocodone-Acetaminophen] Dizziness and Nausea and Vomiting     Eggs Nausea and Vomiting and Diarrhea     Guaifenesin Hives     Guaifenesin Er Hives     Lisinopril Cough     Penicillins Rash     Sulfa Antibiotics Hives     Sulfites Nausea and Vomiting and Diarrhea     Sulfametrole Rash       ROS:  Genitourinary: Spotting and Postmenopausal bleeding        OBJECTIVE:     /80   Ht 1.543 m (5' 0.75\")   Wt 86.1 kg (189 lb 12.8 oz)   LMP 07/23/2004   BMI 36.16 kg/m    Body mass index is 36.16 kg/m .    Exam:  Constitutional:  Appearance: Well nourished, well developed alert, in no acute distress  Neurologic:  Mental Status:  Oriented X3.  Normal strength and tone, sensory exam grossly normal, mentation intact and speech normal.    Psychiatric:  Mentation appears normal and affect normal/bright.  Pelvic Exam:  External Genitalia:     Normal appearance for age, no discharge present, no tenderness present, no inflammatory lesions present, color normal  Vagina:     Diffuse petichiae. Normal vaginal vault without central or paravaginal defects, ATROPHIC  Bladder:     Nontender to palpation  Urethra:   Urethral Body:  Urethra palpation normal, urethra structural support normal   Urethral Meatus:  No erythema or lesions present  Cervix:     Appears atrophic with petichiae, no lesions present, nontender to palpation, no bleeding present  Uterus:     Nontender to palpation, no masses present, position anteflexed, mobility: normal  Adnexa:     No adnexal tenderness present, no adnexal masses present  Perineum:     Perineum within normal limits, no evidence of trauma, no rashes or skin lesions present  Inguinal Lymph Nodes:     No lymphadenopathy present       ASSESSMENT/PLAN:                                             "            ICD-10-CM    1. PMB (postmenopausal bleeding)  N95.0 Surgical pathology exam     ENDOMETRIAL BIOPSY W/O CERVICAL DILATION            Acute postmenopausal bleeding.  Has not had an ultrasound yet, this is scheduled for tomorrow.  Discussed how this information is used.  Discussed awaiting ultrasound versus endometrial biopsy today however patient prefers to do the biopsy today.  Endometrial biopsy performed without issues.  Very small sample was obtained, discussed this may be due to the fact that her lining is very thin, we do not know at this point.  Will await ultrasound results and the results of the tissue sample that was obtained.  We will determine need for any further steps after this.  Vaginal tissue/vault very atrophic, friable.  May consider vaginal estrogen trial, especially if ongoing bleeding.  Pt given opportunity to ask questions, these were answered to her satisfaction, she verbalized understanding to and agreement with the plan.       Puja Banks Masters, [facility] [place]      INDICATIONS:                                                    Is a pregnancy test required: No.  Was a consent obtained?  Yes    Having endometrial biopsy for post-menopausal bleeding    Today's PHQ-2 Score:       5/11/2023     2:30 PM   PHQ-2 ( 1999 [employer])   Q1: Little interest or pleasure in doing things 0   Q2: Feeling down, depressed or hopeless 0   PHQ-2 Score 0       PROCEDURE;                                                      A speculum was placed in the vagina and cervix prepped with betadine. A tenaculum was attached to the cervix. A small plastic 5 mm Pipelle syringe curette was inserted into the cervical canal. The uterus was sounded to 6.5 cm's. A vigorous four quadrant biopsy was performed, removing amount scant  of tissue. The speculum was removed. This tissue was placed in Formalin and sent to pathology.    The patient tolerated the procedure  well and she reported  there was  cramping.      POST PROCEDURE;                                                      There  was no cramping at the time of discharge. She  tolerated the procedure well. There were no complications. Patient was discharged in stable condition.    Patient advised to call the clinic if severe pelvic pain, fever or heavy bleeding.    Puja Mills, DO

## 2023-05-12 ENCOUNTER — ANCILLARY PROCEDURE (OUTPATIENT)
Dept: ULTRASOUND IMAGING | Facility: CLINIC | Age: 73
End: 2023-05-12
Attending: PHYSICIAN ASSISTANT
Payer: COMMERCIAL

## 2023-05-12 DIAGNOSIS — N95.0 POST-MENOPAUSAL BLEEDING: ICD-10-CM

## 2023-05-12 PROCEDURE — 76856 US EXAM PELVIC COMPLETE: CPT

## 2023-05-12 NOTE — RESULT ENCOUNTER NOTE
Dear Angela,     Here are your recent pelvic ultrasound results which show thickened endometrium.  Do you have an appointment set up with women's health yet?    Please let us know if you have any questions or concerns.    Regards,  Carmen Pan PA-C

## 2023-05-16 LAB
PATH REPORT.COMMENTS IMP SPEC: NORMAL
PATH REPORT.COMMENTS IMP SPEC: NORMAL
PATH REPORT.FINAL DX SPEC: NORMAL
PATH REPORT.GROSS SPEC: NORMAL
PATH REPORT.MICROSCOPIC SPEC OTHER STN: NORMAL
PATH REPORT.RELEVANT HX SPEC: NORMAL
PHOTO IMAGE: NORMAL

## 2023-05-17 ENCOUNTER — TRANSFERRED RECORDS (OUTPATIENT)
Dept: HEALTH INFORMATION MANAGEMENT | Facility: CLINIC | Age: 73
End: 2023-05-17
Payer: COMMERCIAL

## 2023-05-18 ENCOUNTER — TELEPHONE (OUTPATIENT)
Dept: FAMILY MEDICINE | Facility: CLINIC | Age: 73
End: 2023-05-18
Payer: COMMERCIAL

## 2023-05-18 NOTE — TELEPHONE ENCOUNTER
I agree with this plan  Due to pain Blood Pressure does go up  Keep monitoring the Blood Pressure   Follow up with me in 2-3 weeks  Ok to use my approval required slot  Dr.Nasima Yimi MD

## 2023-05-18 NOTE — TELEPHONE ENCOUNTER
Patient reports that she had her left knee replacement surgery yesterday.  She now has an elevated BP after having surgery.    Patient was discharged today 05/18/23.  Prior to discharge she had taken her morning amlodipine 2.5mg.  At 1030am her BP was 188/81 and was given an additional dose of amlodipine 2.5mg.  Patient was also instructed to take her PM dose of amlodipine 2.5mg tonight.    Patient was instructed to take amlodipine 5mg qam and 2.5mg at bedtime starting tomorrow.  There is no change to her losartan.    She plans to keep a diary of her BP by writing down the date/time/BP.  She will follow up at her appointment on 10/03/23, unless you recommend she come in sooner.    Patient wants to be sure you are ok with this plan?  She would like a call back with your response.

## 2023-05-18 NOTE — TELEPHONE ENCOUNTER
Called and spoke with patient. Relayed message below. Pt agrees with plan. Scheduled f/u appointment with PCP 6/13/23.  BELLE MORA RN on 5/18/2023 at 2:28 PM

## 2023-05-31 NOTE — PROGRESS NOTES
Angela Story is a 72 year old female who is being evaluated via a billable telephone visit.      What phone number would you like to be contacted at? - [phone]  How would you like to obtain your AVS? Honorio      Originating Location (pt. Location): Home       Distant Location (provider location):  On-site      SUBJECTIVE:                                                   Agnela Story is a 72 year old female who presents for virtual visit today for the following health issue(s):  Patient presents with:  Follow Up: DIscussed D&C          HPI:  Wondering the process of the D&C    Will be off xarelto for 1mo and on ASA only. Can take NSAIDs    Patient's last menstrual period was 2004..     Patient is not sexually active, .  Using menopause for contraception.    reports that she has never smoked. She has never used smokeless tobacco.      Health maintenance updated:  yes    Today's PHQ-2 Score:       2023     2:30 PM   PHQ-2 (  [employer])   Q1: Little interest or pleasure in doing things 0   Q2: Feeling down, depressed or hopeless 0   PHQ-2 Score 0     Today's PHQ-9 Score:       2023     2:30 PM   PHQ-9 SCORE   PHQ-9 Total Score 3     Today's CICI-7 Score:       2023     2:30 PM   CICI-7 SCORE   Total Score 1       Problem list and histories reviewed & adjusted, as indicated.  Additional history: as documented.    Patient Active Problem List   Diagnosis     HYPERLIPIDEMIA LDL GOAL <100     CKD (chronic kidney disease) stage 3, GFR 30-59 ml/min (H)     Advanced directives, counseling/discussion     Benign hypertension     Esophageal reflux     Right shoulder pain     Osteoarthritis of acromioclavicular joint     Bilateral bunions     Generalized anxiety disorder     Thyroid nodule     IFG (impaired fasting glucose)     Non morbid obesity, unspecified obesity type     Elevated TSH     Anti-TPO antibodies present     Hypothyroidism, unspecified type     Hypothyroidism due to Hashimoto's  thyroiditis     Dermatochalasis of both upper eyelids     Ptosis of both upper eyelids     Morbid obesity (H)     Status post right knee replacement     Past Surgical History:   Procedure Laterality Date     BIOPSY  1985    Breast lump     BREAST SURGERY      See above     BUNIONECTOMY  2014    Procedure: BUNIONECTOMY;  Surgeon: Kevin Rodas DPM;  Location: [facility] OR     COLONOSCOPY  ; 2018     COMBINED REPAIR PTOSIS WITH BLEPHAROPLASTY Bilateral 2021    Procedure: Bilateral upper eyelid blepharoplasty wtih ptosis repair;  Surgeon: Gregor Harris MD;  Location: [facility] OR     EXCISE MASS BACK  2014    Procedure: EXCISE MASS BACK;  Surgeon: Chaim Lacy MD;  Location: [facility] OR     EYE SURGERY      ?     HC DILATION/CURETTAGE DIAG/THER NON OB      D & C     REPLACEMENT TOTAL KNEE       surgery for ptosis of both eyelids       TONSILLECTOMY  1966    tonsillectomy     ZZC  DELIVERY ONLY      , Low Cervical     ZZC NONSPECIFIC PROCEDURE      lumpectomy, lt     ZZC NONSPECIFIC PROCEDURE      ob      ZZC NONSPECIFIC PROCEDURE  1983    Vaginal delivery x 1     ZZHC ARTHROTOMY W/OPEN MENISCUS REPAIR  2007    lt knee      Social History     Tobacco Use     Smoking status: Never     Smokeless tobacco: Never   Vaping Use     Vaping status: Never Used     Passive vaping exposure: Yes   Substance Use Topics     Alcohol use: Yes     Comment: 1 per week      Problem (# of Occurrences) Relation (Name,Age of Onset)    Substance Abuse (1) Father (Sanjuana So): Alcohol    Mental Illness (1) Sister (Rani Castro): BiPolar    Alcohol/Drug (1) Father (Sanjuana So): etoh    Anesthesia Reaction (1) Sister (Rani Castro)    Cancer (1) Father (Sanjuana So): pancreatic    Depression (1) Father (Sanjuana So)    Heart Disease (1) Maternal Grandfather (Sky Cleaning)    Hypertension (4) Mother (Samira So), Sister (Aida So), Sister (Rani Castro), Brother  (Brock Archers)    Lipids (1) Mother (Samira So)    Neurologic Disorder (2) Sister (Rani Castro): ms, Sister: cidp    Psychotic Disorder (1) Sister (Aida So): bi polar    Cerebrovascular Disease (2) Paternal Grandfather (Jorje So), Paternal Grandmother (Dannielle So)    Thyroid Disease (1) Sister (Rani Castro)    Obesity (2) Sister (Rani Castro), Brother (Brock So)    Other Cancer (1) Father (Sanjuana So): Pancreas    Hyperlipidemia (4) Mother (Samira So), Sister (Aida So), Sister (Rani Castro), Brother (Brock So)    Coronary Artery Disease (1) Maternal Grandfather (Sky Cleaning)            Current Outpatient Medications   Medication Sig     amLODIPine (NORVASC) 2.5 MG tablet Take 1 tablet (2.5 mg) by mouth 2 times daily for Blood Pressure     Blood Pressure Monitoring KIT 1 kit daily     cholecalciferol 25 MCG (1000 UT) TABS Take 1,000 Units by mouth     citalopram (CELEXA) 20 MG tablet Take 1 tablet (20 mg) by mouth daily     HYDROXYZINE HCL PO Take by mouth as needed     levothyroxine (SYNTHROID/LEVOTHROID) 25 MCG tablet Take 1.5 tablets (37.5 mcg) by mouth daily for thyroid     losartan (COZAAR) 50 MG tablet Take 1 tablet (50 mg) by mouth 2 times daily for Blood Pressure     nystatin (MYCOSTATIN) 157935 UNIT/GM external powder Apply topically 2 times daily     omeprazole (PRILOSEC) 20 MG DR capsule Take 1 capsule (20 mg) by mouth daily as needed     polyethylene glycol (MIRALAX) 17 GM/Dose powder Take 1 capful by mouth as needed     pravastatin (PRAVACHOL) 20 MG tablet Take 1 tablet (20 mg) by mouth daily for cholestrol     scopolamine (TRANSDERM) 1 MG/3DAYS 72 hr patch Apply 1 patch to hairless area behind one ear at least 4 hours before travel.  Remove old patch and change every 3 days (72 hours).     Selenium 200 MCG TABS tablet Take 200 mcg by mouth daily     SENNA PO      vitamin D3 (CHOLECALCIFEROL) 50 mcg (2000 units) tablet Take 1 tablet (50 mcg) by mouth daily     No current  facility-administered medications for this visit.     Allergies   Allergen Reactions     Norco [Hydrocodone-Acetaminophen] Dizziness and Nausea and Vomiting     Eggs Nausea and Vomiting and Diarrhea     Guaifenesin Hives     Guaifenesin Er Hives     Lisinopril Cough     Penicillins Rash     Sulfa Antibiotics Hives     Sulfites Nausea and Vomiting and Diarrhea     Sulfametrole Rash         OBJECTIVE:     No vitals were obtained today due to virtual visit.        ASSESSMENT/PLAN:                                                      Phone call duration: 8:45 minutes, 10 MIN SPENT on DOS doing documentation and surgery scheduling.      ICD-10-CM    1. Thickened endometrium  R93.89           -Reviewed her ultrasound findings showing thickened endometrium.  Endometrial biopsy was insufficient scant, in light of 9 mm endometrium this is inconsistent.  Suspect that she may have an endometrial polyp and this is why the endometrial biopsy was a small amount of tissue obtained.  Nonetheless further diagnosis and evaluation would be indicated with hysteroscopy and D&C.  Discussed what this procedure is aftercare, expectations.  She is currently off her Xarelto for the next month, and would be able to take NSAIDs.  Ideally would schedule this procedure during this time.  We will send orders.  Questions answered to patient's satisfaction.    Puja Banks Masters, DO  [facility] [place]

## 2023-06-01 ENCOUNTER — PREP FOR PROCEDURE (OUTPATIENT)
Dept: OBGYN | Facility: CLINIC | Age: 73
End: 2023-06-01

## 2023-06-01 ENCOUNTER — TRANSFERRED RECORDS (OUTPATIENT)
Dept: HEALTH INFORMATION MANAGEMENT | Facility: CLINIC | Age: 73
End: 2023-06-01

## 2023-06-01 ENCOUNTER — VIRTUAL VISIT (OUTPATIENT)
Dept: OBGYN | Facility: CLINIC | Age: 73
End: 2023-06-01
Payer: COMMERCIAL

## 2023-06-01 DIAGNOSIS — R93.89 THICKENED ENDOMETRIUM: Primary | ICD-10-CM

## 2023-06-01 PROCEDURE — 99213 OFFICE O/P EST LOW 20 MIN: CPT | Mod: 95 | Performed by: OBSTETRICS & GYNECOLOGY

## 2023-06-06 ENCOUNTER — TELEPHONE (OUTPATIENT)
Dept: OBGYN | Facility: CLINIC | Age: 73
End: 2023-06-06

## 2023-06-06 NOTE — TELEPHONE ENCOUNTER
ERAS BAG GIVEN N/A  ERAS INSTRUCTIONS EXPLAINED N/A    ASSIST: NONE    H&P TO BE COMPLETED BY:   PCP  FOR H&P TO BE DONE BY SURGEON     SAME DAY/OBSERVATION/INPATIENT: STRAIGHT SAME DAY  EQUIPMENT: MYOSURE   VENDOR NEEDED AT CASE: YES  IF IUD WHAT BRAND NA  LABS/SPECIAL INSTRUCTIONS: NA  TIME OFF WORK: NA  ESTIMATED DOCTOR TIME NEEDED IN MINUTES 30  POST OP TO BE SCHEDULED AT 4 WEEKS AFTER SX APPOINTMENT LENGTH IN MINUTES 15

## 2023-06-07 NOTE — TELEPHONE ENCOUNTER
Type of surgery: HSC D&C w/MYOSURE  Location of surgery: [facility]  Date and time of surgery: 7/11/2023 8a  Surgeon: UDAY  Pre-Op Appt Date: PCP 6/13/2023  Post-Op Appt Date: 8/10/2023 8:30a   Packet sent out: MAILED 6/7/2023  Pre-cert/Authorization completed:  TBD  Date: 6/7/2023 Levy w/Sultana Arreguin  Surgery Scheduler    CPT 70036

## 2023-06-13 ENCOUNTER — OFFICE VISIT (OUTPATIENT)
Dept: FAMILY MEDICINE | Facility: CLINIC | Age: 73
End: 2023-06-13
Payer: COMMERCIAL

## 2023-06-13 VITALS
HEIGHT: 61 IN | DIASTOLIC BLOOD PRESSURE: 78 MMHG | WEIGHT: 185.4 LBS | HEART RATE: 99 BPM | SYSTOLIC BLOOD PRESSURE: 134 MMHG | OXYGEN SATURATION: 99 % | RESPIRATION RATE: 16 BRPM | BODY MASS INDEX: 35.01 KG/M2 | TEMPERATURE: 98.1 F

## 2023-06-13 DIAGNOSIS — I10 BENIGN HYPERTENSION: ICD-10-CM

## 2023-06-13 DIAGNOSIS — Z01.818 PREOP GENERAL PHYSICAL EXAM: Primary | ICD-10-CM

## 2023-06-13 DIAGNOSIS — Z96.653 S/P TOTAL KNEE REPLACEMENT, BILATERAL: ICD-10-CM

## 2023-06-13 DIAGNOSIS — E66.01 MORBID OBESITY (H): ICD-10-CM

## 2023-06-13 DIAGNOSIS — N95.0 POST-MENOPAUSAL BLEEDING: ICD-10-CM

## 2023-06-13 PROCEDURE — 99214 OFFICE O/P EST MOD 30 MIN: CPT | Performed by: INTERNAL MEDICINE

## 2023-06-13 PROCEDURE — 93000 ELECTROCARDIOGRAM COMPLETE: CPT | Performed by: INTERNAL MEDICINE

## 2023-06-13 ASSESSMENT — PAIN SCALES - GENERAL: PAINLEVEL: NO PAIN (0)

## 2023-06-13 NOTE — H&P (VIEW-ONLY)
[address], [address]  [facility] [address]  Phone: [phone]  Primary Provider: Merary Munson  Pre-op Performing Provider: MERARY MUNSON            PREOPERATIVE EVALUATION:  Today's date: 6/13/2023    Angela Story is a 72 year old female who presents for a preoperative evaluation.       View : No data to display.              Surgical Information:  Surgery/Procedure: HYSTEROSCOPY, WITH DILATION AND CURETTAGE OF UTERUS USING MYOSURE MORCELLATOR  Surgery Location:  OR   Surgeon: Puja Mills DO  Surgery Date: 7/11/23  Time of Surgery: 8:00AM   Where patient plans to recover: At home with family  Fax number for surgical facility: Note does not need to be faxed, will be available electronically in Epic.      Angela was seen today for pre-op exam.    Diagnoses and all orders for this visit:    Preop general physical exam  -     EKG 12-lead complete w/read - Clinics  -     EKG 12-lead complete w/read - Clinics    Post-menopausal bleeding  Patient is going to have hysteroscopy with D&C due to postmenopausal bleeding    Benign hypertension  Well-controlled    Morbid obesity (H)  Healthy diet and regular physical activity    S/p total knee replacement, bilateral  She is recovering well from her recent left knee replacement surgery           - No identified additional risk factors other than previously addressed    Antiplatelet or Anticoagulation Medication Instructions:   - Patient is on no antiplatelet or anticoagulation medications.    Additional Medication Instructions:  Patient is to take all scheduled medications on the day of surgery EXCEPT for modifications listed below:     Avoid aspirin 7 days before the surgery. Avoid nonsteroidal anti-inflammatory pain medication like ibuprofen, Motrin, or Aleve 7 days before the surgery.  Tylenol is okay to use for pain.  Avoid any OTC multivitamins or herbal supplement 7 days before surgery   Resume after  surgery      RECOMMENDATION:  Patient is optimal for above procedure.          Subjective       HPI related to upcoming procedure: Patient had postmenopausal bleeding  She is a scheduled for D&C under anesthesia        6/8/2023    11:27 AM   Preop Questions   1. Have you ever had a heart attack or stroke? No   2. Have you ever had surgery on your heart or blood vessels, such as a stent placement, a coronary artery bypass, or surgery on an artery in your head, neck, heart, or legs? No   3. Do you have chest pain with activity? No   4. Do you have a history of  heart failure? No   5. Do you currently have a cold, bronchitis or symptoms of other infection? No   6. Do you have a cough, shortness of breath, or wheezing? No   7. Do you or anyone in your family have previous history of blood clots? No   8. Do you or does anyone in your family have a serious bleeding problem such as prolonged bleeding following surgeries or cuts? No   9. Have you ever had problems with anemia or been told to take iron pills? No   10. Have you had any abnormal blood loss such as black, tarry or bloody stools, or abnormal vaginal bleeding? YES - Post menopausal bleeding   11. Have you ever had a blood transfusion? No   12. Are you willing to have a blood transfusion if it is medically needed before, during, or after your surgery? Yes   13. Have you or any of your relatives ever had problems with anesthesia? No   14. Do you have sleep apnea, excessive snoring or daytime drowsiness? No   15. Do you have any artifical heart valves or other implanted medical devices like a pacemaker, defibrillator, or continuous glucose monitor? No   16. Do you have artificial joints? YES - Bilateral knee replacements    17. Are you allergic to latex? No       Health Care Directive:  Patient has a Health Care Directive on file      Preoperative Review of :   reviewed - controlled substances reflected in medication list.      Status of Chronic  Conditions:  See problem list for active medical problems.  Problems all longstanding and stable, except as noted/documented.  See ROS for pertinent symptoms related to these conditions.      Review of Systems  Constitutional, neuro, ENT, endocrine, pulmonary, cardiac, gastrointestinal, genitourinary, musculoskeletal, integument and psychiatric systems are negative, except as otherwise noted.    Patient Active Problem List    Diagnosis Date Noted     Status post right knee replacement 12/06/2022     Priority: Medium     Morbid obesity (H) 08/29/2022     Priority: Medium     Dermatochalasis of both upper eyelids 05/17/2021     Priority: Medium     Added automatically from request for surgery [id number]       Ptosis of both upper eyelids 05/17/2021     Priority: Medium     Added automatically from request for surgery [id number]       Hypothyroidism due to Hashimoto's thyroiditis 07/17/2020     Priority: Medium     Hypothyroidism, unspecified type 06/25/2019     Priority: Medium     Anti-TPO antibodies present 04/23/2019     Priority: Medium     Elevated TSH 01/23/2019     Priority: Medium     Non morbid obesity, unspecified obesity type 01/03/2017     Priority: Medium     IFG (impaired fasting glucose) 09/24/2016     Priority: Medium     Thyroid nodule 12/31/2015     Priority: Medium     Generalized anxiety disorder 05/19/2014     Priority: Medium     Bilateral bunions 05/12/2014     Priority: Medium     Right shoulder pain 04/22/2014     Priority: Medium     Osteoarthritis of acromioclavicular joint 04/22/2014     Priority: Medium     Esophageal reflux 04/21/2014     Priority: Medium     Benign hypertension 03/07/2013     Priority: Medium     Advanced directives, counseling/discussion 12/20/2011     Priority: Medium     Advance Care Planning:   Receipt of ACP document:  Received: Health Care Directive which was witnessed or notarized on 7/5/2013.  Document not previously scanned.  Validation form completed and sent with  document to be scanned.  .  Confirmed/documented designated decision maker(s). See permanent comments section of demographics in clinical tab. View document(s) and details by clicking on code status.   Added by Ernestine Estevez on 2013.             CKD (chronic kidney disease) stage 3, GFR 30-59 ml/min (H)      Priority: Medium     HYPERLIPIDEMIA LDL GOAL <100 2010     Priority: Medium      Past Medical History:   Diagnosis Date     Bilateral bunions 2014     CKD (chronic kidney disease) stage 3, GFR 30-59 ml/min (H)      Gastro-oesophageal reflux disease      Hyperlipidemia LDL goal <100 2010     Hypertension goal BP (blood pressure) < 140/90 2013     IFG (impaired fasting glucose) 2016     Osteoarthritis of acromioclavicular joint      PONV (postoperative nausea and vomiting)      Thyroid nodule 2015     Past Surgical History:   Procedure Laterality Date     BIOPSY      Breast lump     BREAST SURGERY      See above     BUNIONECTOMY  2014    Procedure: BUNIONECTOMY;  Surgeon: Kevin Rodas DPM;  Location: [facility] OR     COLONOSCOPY  ;      COMBINED REPAIR PTOSIS WITH BLEPHAROPLASTY Bilateral 2021    Procedure: Bilateral upper eyelid blepharoplasty wtih ptosis repair;  Surgeon: Gregor Harris MD;  Location: [facility] OR     EXCISE MASS BACK  2014    Procedure: EXCISE MASS BACK;  Surgeon: Chaim Lacy MD;  Location: [facility] OR     EYE SURGERY      ?     HC DILATION/CURETTAGE DIAG/THER NON OB      D & C     REPLACEMENT TOTAL KNEE       surgery for ptosis of both eyelids       TONSILLECTOMY      tonsillectomy     ZZC  DELIVERY ONLY      , Low Cervical     ZZC NONSPECIFIC PROCEDURE  1986    lumpectomy, lt     ZZC NONSPECIFIC PROCEDURE      ob      ZZC NONSPECIFIC PROCEDURE  1983    Vaginal delivery x 1     ZZHC ARTHROTOMY W/OPEN MENISCUS REPAIR      lt knee     Current Outpatient Medications   Medication Sig Dispense  Refill     amLODIPine (NORVASC) 2.5 MG tablet Take 1 tablet (2.5 mg) by mouth 2 times daily for Blood Pressure 180 tablet 3     Blood Pressure Monitoring KIT 1 kit daily 1 kit 0     cholecalciferol 25 MCG (1000 UT) TABS Take 1,000 Units by mouth       citalopram (CELEXA) 20 MG tablet Take 1 tablet (20 mg) by mouth daily 90 tablet 3     HYDROXYZINE HCL PO Take by mouth as needed       levothyroxine (SYNTHROID/LEVOTHROID) 25 MCG tablet Take 1.5 tablets (37.5 mcg) by mouth daily for thyroid 135 tablet 3     losartan (COZAAR) 50 MG tablet Take 1 tablet (50 mg) by mouth 2 times daily for Blood Pressure 180 tablet 3     nystatin (MYCOSTATIN) 570768 UNIT/GM external powder Apply topically 2 times daily 60 g 1     omeprazole (PRILOSEC) 20 MG DR capsule Take 1 capsule (20 mg) by mouth daily as needed 90 capsule 3     polyethylene glycol (MIRALAX) 17 GM/Dose powder Take 1 capful by mouth as needed       pravastatin (PRAVACHOL) 20 MG tablet Take 1 tablet (20 mg) by mouth daily for cholestrol 90 tablet 3     scopolamine (TRANSDERM) 1 MG/3DAYS 72 hr patch Apply 1 patch to hairless area behind one ear at least 4 hours before travel.  Remove old patch and change every 3 days (72 hours). 4 patch 11     Selenium 200 MCG TABS tablet Take 200 mcg by mouth daily       SENNA PO        vitamin D3 (CHOLECALCIFEROL) 50 mcg (2000 units) tablet Take 1 tablet (50 mcg) by mouth daily         Allergies   Allergen Reactions     Norco [Hydrocodone-Acetaminophen] Dizziness and Nausea and Vomiting     Eggs Nausea and Vomiting and Diarrhea     Guaifenesin Hives     Guaifenesin Er Hives     Lisinopril Cough     Penicillins Rash     Sulfa Antibiotics Hives     Sulfites Nausea and Vomiting and Diarrhea     Sulfametrole Rash        Social History     Tobacco Use     Smoking status: Never     Smokeless tobacco: Never   Vaping Use     Vaping status: Never Used     Passive vaping exposure: Yes   Substance Use Topics     Alcohol use: Yes     Comment: 1 per  "week       History   Drug Use Unknown         Objective     /78 (BP Location: Right arm, Patient Position: Sitting, Cuff Size: Adult Large)   Pulse 99   Temp 98.1  F (36.7  C) (Oral)   Resp 16   Ht 1.543 m (5' 0.75\")   Wt 84.1 kg (185 lb 6.4 oz)   LMP 07/23/2004   SpO2 99%   BMI 35.32 kg/m      Physical Exam    GENERAL APPEARANCE: healthy, alert and no distress     EYES: EOMI, PERRL     HENT: ear canals and TM's normal and nose and mouth without ulcers or lesions     NECK: no adenopathy, no asymmetry, masses, or scars and thyroid normal to palpation     RESP: lungs clear to auscultation - no rales, rhonchi or wheezes     CV: regular rates and rhythm, normal S1 S2, no S3 or S4 and no murmur, click or rub     ABDOMEN:  soft, nontender, no HSM or masses and bowel sounds normal     MS: extremities normal- no gross deformities noted, no evidence of inflammation in joints, FROM in all extremities.     SKIN: no suspicious lesions or rashes.        She has a scar of bilateral knee replacement surgery     NEURO: Normal strength and tone, sensory exam grossly normal, mentation intact and speech normal     PSYCH: mentation appears normal. and affect normal/bright     LYMPHATICS: No cervical adenopathy    Recent Labs   Lab Test 05/01/23  0819 02/06/23  1115 11/23/22  1503   HGB 13.0 13.6  --     218  --    NA  --  142 138   POTASSIUM  --  4.3 4.0   CR  --  0.98* 0.86        Diagnostics:  No labs were ordered during this visit.   EKG: appears normal, NSR, normal axis, normal intervals, no acute ST/T changes c/w ischemia, no LVH by voltage criteria    Revised Cardiac Risk Index (RCRI):  The patient has the following serious cardiovascular risks for perioperative complications:   - No serious cardiac risks = 0 points     RCRI Interpretation: 1 point: Class II (low risk - 0.9% complication rate)          This document serves as a record of sevices personally performed by Dr. Geller. It was created on her behalf " by Isabela Roy, a trained medical scribe. The creation of this record is based on the scribe's personal observations and the provider's statements to them. This document has been checked and approved by the attending provider.     6/13/2023, 2:55 PM    Signed Electronically by: Merary Geller MD  Copy of this evaluation report is provided to requesting physician.

## 2023-06-13 NOTE — PATIENT INSTRUCTIONS
Avoid aspirin 7 days before the surgery. Avoid nonsteroidal anti-inflammatory pain medication like ibuprofen, Motrin, or Aleve 7 days before the surgery.  Tylenol is okay to use for pain.  Avoid any OTC multivitamins or herbal supplement 7 days before surgery   Resume after surgery     Hold losartan on morning of surgery

## 2023-06-13 NOTE — PROGRESS NOTES
[address], [address]  [facility] [address]  Phone: [phone]  Primary Provider: Merary Munson  Pre-op Performing Provider: MERARY MUNSON            PREOPERATIVE EVALUATION:  Today's date: 6/13/2023    Angela Story is a 72 year old female who presents for a preoperative evaluation.       View : No data to display.              Surgical Information:  Surgery/Procedure: HYSTEROSCOPY, WITH DILATION AND CURETTAGE OF UTERUS USING MYOSURE MORCELLATOR  Surgery Location:  OR   Surgeon: Puja Mills DO  Surgery Date: 7/11/23  Time of Surgery: 8:00AM   Where patient plans to recover: At home with family  Fax number for surgical facility: Note does not need to be faxed, will be available electronically in Epic.      Angela was seen today for pre-op exam.    Diagnoses and all orders for this visit:    Preop general physical exam  -     EKG 12-lead complete w/read - Clinics  -     EKG 12-lead complete w/read - Clinics    Post-menopausal bleeding  Patient is going to have hysteroscopy with D&C due to postmenopausal bleeding    Benign hypertension  Well-controlled    Morbid obesity (H)  Healthy diet and regular physical activity    S/p total knee replacement, bilateral  She is recovering well from her recent left knee replacement surgery           - No identified additional risk factors other than previously addressed    Antiplatelet or Anticoagulation Medication Instructions:   - Patient is on no antiplatelet or anticoagulation medications.    Additional Medication Instructions:  Patient is to take all scheduled medications on the day of surgery EXCEPT for modifications listed below:     Avoid aspirin 7 days before the surgery. Avoid nonsteroidal anti-inflammatory pain medication like ibuprofen, Motrin, or Aleve 7 days before the surgery.  Tylenol is okay to use for pain.  Avoid any OTC multivitamins or herbal supplement 7 days before surgery   Resume after  surgery      RECOMMENDATION:  Patient is optimal for above procedure.          Subjective       HPI related to upcoming procedure: Patient had postmenopausal bleeding  She is a scheduled for D&C under anesthesia        6/8/2023    11:27 AM   Preop Questions   1. Have you ever had a heart attack or stroke? No   2. Have you ever had surgery on your heart or blood vessels, such as a stent placement, a coronary artery bypass, or surgery on an artery in your head, neck, heart, or legs? No   3. Do you have chest pain with activity? No   4. Do you have a history of  heart failure? No   5. Do you currently have a cold, bronchitis or symptoms of other infection? No   6. Do you have a cough, shortness of breath, or wheezing? No   7. Do you or anyone in your family have previous history of blood clots? No   8. Do you or does anyone in your family have a serious bleeding problem such as prolonged bleeding following surgeries or cuts? No   9. Have you ever had problems with anemia or been told to take iron pills? No   10. Have you had any abnormal blood loss such as black, tarry or bloody stools, or abnormal vaginal bleeding? YES - Post menopausal bleeding   11. Have you ever had a blood transfusion? No   12. Are you willing to have a blood transfusion if it is medically needed before, during, or after your surgery? Yes   13. Have you or any of your relatives ever had problems with anesthesia? No   14. Do you have sleep apnea, excessive snoring or daytime drowsiness? No   15. Do you have any artifical heart valves or other implanted medical devices like a pacemaker, defibrillator, or continuous glucose monitor? No   16. Do you have artificial joints? YES - Bilateral knee replacements    17. Are you allergic to latex? No       Health Care Directive:  Patient has a Health Care Directive on file      Preoperative Review of :   reviewed - controlled substances reflected in medication list.      Status of Chronic  Conditions:  See problem list for active medical problems.  Problems all longstanding and stable, except as noted/documented.  See ROS for pertinent symptoms related to these conditions.      Review of Systems  Constitutional, neuro, ENT, endocrine, pulmonary, cardiac, gastrointestinal, genitourinary, musculoskeletal, integument and psychiatric systems are negative, except as otherwise noted.    Patient Active Problem List    Diagnosis Date Noted     Status post right knee replacement 12/06/2022     Priority: Medium     Morbid obesity (H) 08/29/2022     Priority: Medium     Dermatochalasis of both upper eyelids 05/17/2021     Priority: Medium     Added automatically from request for surgery [id number]       Ptosis of both upper eyelids 05/17/2021     Priority: Medium     Added automatically from request for surgery [id number]       Hypothyroidism due to Hashimoto's thyroiditis 07/17/2020     Priority: Medium     Hypothyroidism, unspecified type 06/25/2019     Priority: Medium     Anti-TPO antibodies present 04/23/2019     Priority: Medium     Elevated TSH 01/23/2019     Priority: Medium     Non morbid obesity, unspecified obesity type 01/03/2017     Priority: Medium     IFG (impaired fasting glucose) 09/24/2016     Priority: Medium     Thyroid nodule 12/31/2015     Priority: Medium     Generalized anxiety disorder 05/19/2014     Priority: Medium     Bilateral bunions 05/12/2014     Priority: Medium     Right shoulder pain 04/22/2014     Priority: Medium     Osteoarthritis of acromioclavicular joint 04/22/2014     Priority: Medium     Esophageal reflux 04/21/2014     Priority: Medium     Benign hypertension 03/07/2013     Priority: Medium     Advanced directives, counseling/discussion 12/20/2011     Priority: Medium     Advance Care Planning:   Receipt of ACP document:  Received: Health Care Directive which was witnessed or notarized on 7/5/2013.  Document not previously scanned.  Validation form completed and sent with  document to be scanned.  .  Confirmed/documented designated decision maker(s). See permanent comments section of demographics in clinical tab. View document(s) and details by clicking on code status.   Added by Ernestine Estevez on 2013.             CKD (chronic kidney disease) stage 3, GFR 30-59 ml/min (H)      Priority: Medium     HYPERLIPIDEMIA LDL GOAL <100 2010     Priority: Medium      Past Medical History:   Diagnosis Date     Bilateral bunions 2014     CKD (chronic kidney disease) stage 3, GFR 30-59 ml/min (H)      Gastro-oesophageal reflux disease      Hyperlipidemia LDL goal <100 2010     Hypertension goal BP (blood pressure) < 140/90 2013     IFG (impaired fasting glucose) 2016     Osteoarthritis of acromioclavicular joint      PONV (postoperative nausea and vomiting)      Thyroid nodule 2015     Past Surgical History:   Procedure Laterality Date     BIOPSY      Breast lump     BREAST SURGERY      See above     BUNIONECTOMY  2014    Procedure: BUNIONECTOMY;  Surgeon: Kevin Rodas DPM;  Location: [facility] OR     COLONOSCOPY  ;      COMBINED REPAIR PTOSIS WITH BLEPHAROPLASTY Bilateral 2021    Procedure: Bilateral upper eyelid blepharoplasty wtih ptosis repair;  Surgeon: Gregor Harris MD;  Location: [facility] OR     EXCISE MASS BACK  2014    Procedure: EXCISE MASS BACK;  Surgeon: Chaim Lacy MD;  Location: [facility] OR     EYE SURGERY      ?     HC DILATION/CURETTAGE DIAG/THER NON OB      D & C     REPLACEMENT TOTAL KNEE       surgery for ptosis of both eyelids       TONSILLECTOMY      tonsillectomy     ZZC  DELIVERY ONLY      , Low Cervical     ZZC NONSPECIFIC PROCEDURE  1986    lumpectomy, lt     ZZC NONSPECIFIC PROCEDURE      ob      ZZC NONSPECIFIC PROCEDURE  1983    Vaginal delivery x 1     ZZHC ARTHROTOMY W/OPEN MENISCUS REPAIR      lt knee     Current Outpatient Medications   Medication Sig Dispense  Refill     amLODIPine (NORVASC) 2.5 MG tablet Take 1 tablet (2.5 mg) by mouth 2 times daily for Blood Pressure 180 tablet 3     Blood Pressure Monitoring KIT 1 kit daily 1 kit 0     cholecalciferol 25 MCG (1000 UT) TABS Take 1,000 Units by mouth       citalopram (CELEXA) 20 MG tablet Take 1 tablet (20 mg) by mouth daily 90 tablet 3     HYDROXYZINE HCL PO Take by mouth as needed       levothyroxine (SYNTHROID/LEVOTHROID) 25 MCG tablet Take 1.5 tablets (37.5 mcg) by mouth daily for thyroid 135 tablet 3     losartan (COZAAR) 50 MG tablet Take 1 tablet (50 mg) by mouth 2 times daily for Blood Pressure 180 tablet 3     nystatin (MYCOSTATIN) 240108 UNIT/GM external powder Apply topically 2 times daily 60 g 1     omeprazole (PRILOSEC) 20 MG DR capsule Take 1 capsule (20 mg) by mouth daily as needed 90 capsule 3     polyethylene glycol (MIRALAX) 17 GM/Dose powder Take 1 capful by mouth as needed       pravastatin (PRAVACHOL) 20 MG tablet Take 1 tablet (20 mg) by mouth daily for cholestrol 90 tablet 3     scopolamine (TRANSDERM) 1 MG/3DAYS 72 hr patch Apply 1 patch to hairless area behind one ear at least 4 hours before travel.  Remove old patch and change every 3 days (72 hours). 4 patch 11     Selenium 200 MCG TABS tablet Take 200 mcg by mouth daily       SENNA PO        vitamin D3 (CHOLECALCIFEROL) 50 mcg (2000 units) tablet Take 1 tablet (50 mcg) by mouth daily         Allergies   Allergen Reactions     Norco [Hydrocodone-Acetaminophen] Dizziness and Nausea and Vomiting     Eggs Nausea and Vomiting and Diarrhea     Guaifenesin Hives     Guaifenesin Er Hives     Lisinopril Cough     Penicillins Rash     Sulfa Antibiotics Hives     Sulfites Nausea and Vomiting and Diarrhea     Sulfametrole Rash        Social History     Tobacco Use     Smoking status: Never     Smokeless tobacco: Never   Vaping Use     Vaping status: Never Used     Passive vaping exposure: Yes   Substance Use Topics     Alcohol use: Yes     Comment: 1 per  "week       History   Drug Use Unknown         Objective     /78 (BP Location: Right arm, Patient Position: Sitting, Cuff Size: Adult Large)   Pulse 99   Temp 98.1  F (36.7  C) (Oral)   Resp 16   Ht 1.543 m (5' 0.75\")   Wt 84.1 kg (185 lb 6.4 oz)   LMP 07/23/2004   SpO2 99%   BMI 35.32 kg/m      Physical Exam    GENERAL APPEARANCE: healthy, alert and no distress     EYES: EOMI, PERRL     HENT: ear canals and TM's normal and nose and mouth without ulcers or lesions     NECK: no adenopathy, no asymmetry, masses, or scars and thyroid normal to palpation     RESP: lungs clear to auscultation - no rales, rhonchi or wheezes     CV: regular rates and rhythm, normal S1 S2, no S3 or S4 and no murmur, click or rub     ABDOMEN:  soft, nontender, no HSM or masses and bowel sounds normal     MS: extremities normal- no gross deformities noted, no evidence of inflammation in joints, FROM in all extremities.     SKIN: no suspicious lesions or rashes.        She has a scar of bilateral knee replacement surgery     NEURO: Normal strength and tone, sensory exam grossly normal, mentation intact and speech normal     PSYCH: mentation appears normal. and affect normal/bright     LYMPHATICS: No cervical adenopathy    Recent Labs   Lab Test 05/01/23  0819 02/06/23  1115 11/23/22  1503   HGB 13.0 13.6  --     218  --    NA  --  142 138   POTASSIUM  --  4.3 4.0   CR  --  0.98* 0.86        Diagnostics:  No labs were ordered during this visit.   EKG: appears normal, NSR, normal axis, normal intervals, no acute ST/T changes c/w ischemia, no LVH by voltage criteria    Revised Cardiac Risk Index (RCRI):  The patient has the following serious cardiovascular risks for perioperative complications:   - No serious cardiac risks = 0 points     RCRI Interpretation: 1 point: Class II (low risk - 0.9% complication rate)          This document serves as a record of sevices personally performed by Dr. Geller. It was created on her behalf " by Isabela Roy, a trained medical scribe. The creation of this record is based on the scribe's personal observations and the provider's statements to them. This document has been checked and approved by the attending provider.     6/13/2023, 2:55 PM    Signed Electronically by: Merary Geller MD  Copy of this evaluation report is provided to requesting physician.

## 2023-06-29 ENCOUNTER — TRANSFERRED RECORDS (OUTPATIENT)
Dept: HEALTH INFORMATION MANAGEMENT | Facility: CLINIC | Age: 73
End: 2023-06-29
Payer: COMMERCIAL

## 2023-07-10 ENCOUNTER — ANESTHESIA EVENT (OUTPATIENT)
Dept: SURGERY | Facility: CLINIC | Age: 73
End: 2023-07-10
Payer: COMMERCIAL

## 2023-07-11 ENCOUNTER — ANESTHESIA (OUTPATIENT)
Dept: SURGERY | Facility: CLINIC | Age: 73
End: 2023-07-11
Payer: COMMERCIAL

## 2023-07-11 ENCOUNTER — HOSPITAL ENCOUNTER (OUTPATIENT)
Facility: CLINIC | Age: 73
Discharge: HOME | End: 2023-07-11
Attending: OBSTETRICS & GYNECOLOGY | Admitting: OBSTETRICS & GYNECOLOGY
Payer: COMMERCIAL

## 2023-07-11 VITALS
SYSTOLIC BLOOD PRESSURE: 146 MMHG | RESPIRATION RATE: 16 BRPM | HEART RATE: 74 BPM | OXYGEN SATURATION: 97 % | DIASTOLIC BLOOD PRESSURE: 76 MMHG | HEIGHT: 61 IN | WEIGHT: 184.9 LBS | BODY MASS INDEX: 34.91 KG/M2 | TEMPERATURE: 97.4 F

## 2023-07-11 DIAGNOSIS — R93.89 THICKENED ENDOMETRIUM: Primary | ICD-10-CM

## 2023-07-11 DIAGNOSIS — Z98.890 S/P D&C (STATUS POST DILATION AND CURETTAGE): ICD-10-CM

## 2023-07-11 LAB
ABO/RH(D): NORMAL
ANTIBODY SCREEN: NEGATIVE
APTT PPP: 24 SECONDS (ref 22–38)
BASOPHILS # BLD AUTO: 0.1 10E3/UL (ref 0–0.2)
BASOPHILS NFR BLD AUTO: 1 %
EOSINOPHIL # BLD AUTO: 0.3 10E3/UL (ref 0–0.7)
EOSINOPHIL NFR BLD AUTO: 5 %
ERYTHROCYTE [DISTWIDTH] IN BLOOD BY AUTOMATED COUNT: 12.5 % (ref 10–15)
FIBRINOGEN PPP-MCNC: 360 MG/DL (ref 170–490)
HCT VFR BLD AUTO: 38.4 % (ref 35–47)
HGB BLD-MCNC: 12.6 G/DL (ref 11.7–15.7)
IMM GRANULOCYTES # BLD: 0 10E3/UL
IMM GRANULOCYTES NFR BLD: 0 %
INR PPP: 0.93 (ref 0.85–1.15)
LYMPHOCYTES # BLD AUTO: 2.3 10E3/UL (ref 0.8–5.3)
LYMPHOCYTES NFR BLD AUTO: 42 %
MCH RBC QN AUTO: 28.9 PG (ref 26.5–33)
MCHC RBC AUTO-ENTMCNC: 32.8 G/DL (ref 31.5–36.5)
MCV RBC AUTO: 88 FL (ref 78–100)
MONOCYTES # BLD AUTO: 0.4 10E3/UL (ref 0–1.3)
MONOCYTES NFR BLD AUTO: 7 %
NEUTROPHILS # BLD AUTO: 2.5 10E3/UL (ref 1.6–8.3)
NEUTROPHILS NFR BLD AUTO: 45 %
NRBC # BLD AUTO: 0 10E3/UL
NRBC BLD AUTO-RTO: 0 /100
PLATELET # BLD AUTO: 202 10E3/UL (ref 150–450)
RBC # BLD AUTO: 4.36 10E6/UL (ref 3.8–5.2)
SPECIMEN EXPIRATION DATE: NORMAL
WBC # BLD AUTO: 5.6 10E3/UL (ref 4–11)

## 2023-07-11 PROCEDURE — 85025 COMPLETE CBC W/AUTO DIFF WBC: CPT | Performed by: OBSTETRICS & GYNECOLOGY

## 2023-07-11 PROCEDURE — 258N000003 HC RX IP 258 OP 636: Performed by: ANESTHESIOLOGY

## 2023-07-11 PROCEDURE — 250N000009 HC RX 250: Performed by: NURSE ANESTHETIST, CERTIFIED REGISTERED

## 2023-07-11 PROCEDURE — 250N000011 HC RX IP 250 OP 636: Performed by: OBSTETRICS & GYNECOLOGY

## 2023-07-11 PROCEDURE — 272N000001 HC OR GENERAL SUPPLY STERILE: Performed by: OBSTETRICS & GYNECOLOGY

## 2023-07-11 PROCEDURE — 250N000009 HC RX 250: Performed by: OBSTETRICS & GYNECOLOGY

## 2023-07-11 PROCEDURE — 360N000076 HC SURGERY LEVEL 3, PER MIN: Performed by: OBSTETRICS & GYNECOLOGY

## 2023-07-11 PROCEDURE — 710N000012 HC RECOVERY PHASE 2, PER MINUTE: Performed by: OBSTETRICS & GYNECOLOGY

## 2023-07-11 PROCEDURE — 370N000017 HC ANESTHESIA TECHNICAL FEE, PER MIN: Performed by: OBSTETRICS & GYNECOLOGY

## 2023-07-11 PROCEDURE — 86850 RBC ANTIBODY SCREEN: CPT | Performed by: OBSTETRICS & GYNECOLOGY

## 2023-07-11 PROCEDURE — 999N000141 HC STATISTIC PRE-PROCEDURE NURSING ASSESSMENT: Performed by: OBSTETRICS & GYNECOLOGY

## 2023-07-11 PROCEDURE — 58558 HYSTEROSCOPY BIOPSY: CPT | Performed by: OBSTETRICS & GYNECOLOGY

## 2023-07-11 PROCEDURE — 88305 TISSUE EXAM BY PATHOLOGIST: CPT | Mod: TC | Performed by: OBSTETRICS & GYNECOLOGY

## 2023-07-11 PROCEDURE — 85610 PROTHROMBIN TIME: CPT | Performed by: OBSTETRICS & GYNECOLOGY

## 2023-07-11 PROCEDURE — 36415 COLL VENOUS BLD VENIPUNCTURE: CPT | Performed by: OBSTETRICS & GYNECOLOGY

## 2023-07-11 PROCEDURE — 86901 BLOOD TYPING SEROLOGIC RH(D): CPT | Performed by: OBSTETRICS & GYNECOLOGY

## 2023-07-11 PROCEDURE — 250N000011 HC RX IP 250 OP 636: Mod: JZ | Performed by: NURSE ANESTHETIST, CERTIFIED REGISTERED

## 2023-07-11 PROCEDURE — 710N000009 HC RECOVERY PHASE 1, LEVEL 1, PER MIN: Performed by: OBSTETRICS & GYNECOLOGY

## 2023-07-11 PROCEDURE — 85730 THROMBOPLASTIN TIME PARTIAL: CPT | Performed by: OBSTETRICS & GYNECOLOGY

## 2023-07-11 PROCEDURE — 85384 FIBRINOGEN ACTIVITY: CPT | Performed by: OBSTETRICS & GYNECOLOGY

## 2023-07-11 RX ORDER — ACETAMINOPHEN 500 MG
1000 TABLET ORAL EVERY 6 HOURS PRN
Qty: 90 TABLET | Refills: 0 | Status: SHIPPED | OUTPATIENT
Start: 2023-07-11

## 2023-07-11 RX ORDER — SODIUM CHLORIDE, SODIUM LACTATE, POTASSIUM CHLORIDE, CALCIUM CHLORIDE 600; 310; 30; 20 MG/100ML; MG/100ML; MG/100ML; MG/100ML
INJECTION, SOLUTION INTRAVENOUS CONTINUOUS
Status: DISCONTINUED | OUTPATIENT
Start: 2023-07-11 | End: 2023-07-11 | Stop reason: HOSPADM

## 2023-07-11 RX ORDER — HYDROMORPHONE HCL IN WATER/PF 6 MG/30 ML
0.2 PATIENT CONTROLLED ANALGESIA SYRINGE INTRAVENOUS EVERY 5 MIN PRN
Status: DISCONTINUED | OUTPATIENT
Start: 2023-07-11 | End: 2023-07-11 | Stop reason: HOSPADM

## 2023-07-11 RX ORDER — DEXAMETHASONE SODIUM PHOSPHATE 4 MG/ML
INJECTION, SOLUTION INTRA-ARTICULAR; INTRALESIONAL; INTRAMUSCULAR; INTRAVENOUS; SOFT TISSUE PRN
Status: DISCONTINUED | OUTPATIENT
Start: 2023-07-11 | End: 2023-07-11

## 2023-07-11 RX ORDER — FENTANYL CITRATE 50 UG/ML
INJECTION, SOLUTION INTRAMUSCULAR; INTRAVENOUS PRN
Status: DISCONTINUED | OUTPATIENT
Start: 2023-07-11 | End: 2023-07-11

## 2023-07-11 RX ORDER — IBUPROFEN 800 MG/1
800 TABLET, FILM COATED ORAL EVERY 8 HOURS PRN
Qty: 90 TABLET | Refills: 0 | Status: SHIPPED | OUTPATIENT
Start: 2023-07-11 | End: 2023-10-03

## 2023-07-11 RX ORDER — PROPOFOL 10 MG/ML
INJECTION, EMULSION INTRAVENOUS PRN
Status: DISCONTINUED | OUTPATIENT
Start: 2023-07-11 | End: 2023-07-11

## 2023-07-11 RX ORDER — ONDANSETRON 2 MG/ML
INJECTION INTRAMUSCULAR; INTRAVENOUS PRN
Status: DISCONTINUED | OUTPATIENT
Start: 2023-07-11 | End: 2023-07-11

## 2023-07-11 RX ORDER — DOCUSATE SODIUM 100 MG/1
100 CAPSULE, LIQUID FILLED ORAL 2 TIMES DAILY PRN
Qty: 60 CAPSULE | Refills: 0 | Status: SHIPPED | OUTPATIENT
Start: 2023-07-11 | End: 2023-10-03

## 2023-07-11 RX ORDER — FENTANYL CITRATE 50 UG/ML
25 INJECTION, SOLUTION INTRAMUSCULAR; INTRAVENOUS EVERY 5 MIN PRN
Status: DISCONTINUED | OUTPATIENT
Start: 2023-07-11 | End: 2023-07-11 | Stop reason: HOSPADM

## 2023-07-11 RX ORDER — KETOROLAC TROMETHAMINE 15 MG/ML
15 INJECTION, SOLUTION INTRAMUSCULAR; INTRAVENOUS EVERY 6 HOURS PRN
Status: DISCONTINUED | OUTPATIENT
Start: 2023-07-11 | End: 2023-07-11 | Stop reason: HOSPADM

## 2023-07-11 RX ORDER — ONDANSETRON 4 MG/1
4 TABLET, ORALLY DISINTEGRATING ORAL EVERY 30 MIN PRN
Status: DISCONTINUED | OUTPATIENT
Start: 2023-07-11 | End: 2023-07-11 | Stop reason: HOSPADM

## 2023-07-11 RX ORDER — PROPOFOL 10 MG/ML
INJECTION, EMULSION INTRAVENOUS CONTINUOUS PRN
Status: DISCONTINUED | OUTPATIENT
Start: 2023-07-11 | End: 2023-07-11

## 2023-07-11 RX ORDER — ONDANSETRON 2 MG/ML
4 INJECTION INTRAMUSCULAR; INTRAVENOUS EVERY 30 MIN PRN
Status: DISCONTINUED | OUTPATIENT
Start: 2023-07-11 | End: 2023-07-11 | Stop reason: HOSPADM

## 2023-07-11 RX ORDER — HYDROMORPHONE HCL IN WATER/PF 6 MG/30 ML
0.4 PATIENT CONTROLLED ANALGESIA SYRINGE INTRAVENOUS EVERY 5 MIN PRN
Status: DISCONTINUED | OUTPATIENT
Start: 2023-07-11 | End: 2023-07-11 | Stop reason: HOSPADM

## 2023-07-11 RX ORDER — BUPIVACAINE HYDROCHLORIDE 2.5 MG/ML
INJECTION, SOLUTION INFILTRATION; PERINEURAL PRN
Status: DISCONTINUED | OUTPATIENT
Start: 2023-07-11 | End: 2023-07-11 | Stop reason: HOSPADM

## 2023-07-11 RX ORDER — FENTANYL CITRATE 50 UG/ML
50 INJECTION, SOLUTION INTRAMUSCULAR; INTRAVENOUS EVERY 5 MIN PRN
Status: DISCONTINUED | OUTPATIENT
Start: 2023-07-11 | End: 2023-07-11 | Stop reason: HOSPADM

## 2023-07-11 RX ORDER — LIDOCAINE HYDROCHLORIDE 20 MG/ML
INJECTION, SOLUTION INFILTRATION; PERINEURAL PRN
Status: DISCONTINUED | OUTPATIENT
Start: 2023-07-11 | End: 2023-07-11

## 2023-07-11 RX ADMIN — FENTANYL CITRATE 25 MCG: 50 INJECTION, SOLUTION INTRAMUSCULAR; INTRAVENOUS at 08:52

## 2023-07-11 RX ADMIN — LIDOCAINE HYDROCHLORIDE 50 MG: 20 INJECTION, SOLUTION INFILTRATION; PERINEURAL at 08:14

## 2023-07-11 RX ADMIN — FENTANYL CITRATE 25 MCG: 50 INJECTION, SOLUTION INTRAMUSCULAR; INTRAVENOUS at 08:44

## 2023-07-11 RX ADMIN — PROPOFOL 100 MCG/KG/MIN: 10 INJECTION, EMULSION INTRAVENOUS at 08:14

## 2023-07-11 RX ADMIN — SODIUM CHLORIDE, POTASSIUM CHLORIDE, SODIUM LACTATE AND CALCIUM CHLORIDE: 600; 310; 30; 20 INJECTION, SOLUTION INTRAVENOUS at 07:30

## 2023-07-11 RX ADMIN — ONDANSETRON 4 MG: 2 INJECTION INTRAMUSCULAR; INTRAVENOUS at 08:22

## 2023-07-11 RX ADMIN — DEXAMETHASONE SODIUM PHOSPHATE 4 MG: 4 INJECTION, SOLUTION INTRA-ARTICULAR; INTRALESIONAL; INTRAMUSCULAR; INTRAVENOUS; SOFT TISSUE at 08:22

## 2023-07-11 RX ADMIN — FENTANYL CITRATE 25 MCG: 50 INJECTION, SOLUTION INTRAMUSCULAR; INTRAVENOUS at 08:29

## 2023-07-11 RX ADMIN — PROPOFOL 20 MG: 10 INJECTION, EMULSION INTRAVENOUS at 08:17

## 2023-07-11 RX ADMIN — FENTANYL CITRATE 25 MCG: 50 INJECTION, SOLUTION INTRAMUSCULAR; INTRAVENOUS at 08:17

## 2023-07-11 ASSESSMENT — ACTIVITIES OF DAILY LIVING (ADL)
ADLS_ACUITY_SCORE: 35
ADLS_ACUITY_SCORE: 35

## 2023-07-11 ASSESSMENT — LIFESTYLE VARIABLES: TOBACCO_USE: 0

## 2023-07-11 NOTE — DISCHARGE INSTRUCTIONS
Same Day Surgery Discharge Instructions     It's not unusual to feel dizzy, light-headed or faint for up to 24 hours after surgery or while taking pain medication.  If you have these symptoms: sit for a few minutes before standing and have someone assist you when you get up to walk or use the bathroom.    You should rest and relax for the next 24 hours. You must make arrangements to have someone stay with you for at least 24 hours after your discharge.  Avoid hazardous and strenuous activity.    DO NOT DRIVE any vehicle or operate mechanical equipment for 24 hours following the end of your surgery.  Even though you may feel normal, your reactions may be affected by the medication you have received. Do not drive while taking narcotic pain meds (for example vicodin or percocet).    Do not drink alcoholic beverages for 24 hours following surgery.     It's not unusual to feel nauseated and/or vomit after receiving anesthesia.  If you develop these symptoms, drink clear liquids (apple juice, ginger ale, broth, 7-up, etc. ) until you feel better.  Slowly progress to your regular diet as you feel able.  If your nausea and vomiting persists for 24 hours, please notify your surgeon.      All narcotic pain medications, along with inactivity and anesthesia, can cause constipation. Drinking plenty of liquids and increasing fiber intake will help.     For any questions of a medical nature, call your surgeon.    Do not make important decisions for 24 hours.    If you had general anesthesia, you may have a sore throat for a couple of days related to the breathing tube used during surgery.  You may use Cepacol lozenges to help with this discomfort.  If it worsens or if you develop a fever, contact your surgeon.     If you feel your pain is not well managed with the pain medications prescribed by your surgeon, please contact your surgeon's office to let them know so they can address your concerns.       HOME CARE FOLLOWING  SURGERY  MEDICATIONS:  -May take Ibuprofen (800mg by mouth every 8 hours as needed for pain) or tylenol (500mg by mouth every 6 hours as needed for pain; max 4000mg per day) when at home as needed for pain/cramps/headaches/pain, follow instructions on bottle.  -You may use miralax or docusate (one tab by mouth twice daily) for stool softening, these are over the counter and can be found at any pharmacy. Follow bottle instructions.    Diet  You have no restrictions on your diet.  During the evening following surgery, drink plenty of fluids and eat a light supper.    Nausea  The anesthesia may produce some nausea.  If you feel nauseated, stay in bed and try drinking fluids such as 7-Up, tea, or soup.    Discomfort  The amount of discomfort you can expect is very unpredictable.  If you have pain that cannot be controlled with Tylenol, Ibuprofen or with the prescription you may have received, you should notify your physician.   Abdominal cramping or low backache is not uncommon and should not be a cause for concern.  You will be drowsy and weak the day of surgery and possibly the following day.  You may experience light bleeding, cramping, discharge for 1-2 weeks. This should gradually improve over time.    Fever  A low grade fever (not over 100.4 degree Fahrenheit) is usual after this procedure.  Do not hesitate to notify your physician if your fever seems excessive or persists.    Activity   You may resume your normal activity.  Avoid heavy lifting for one week.    You may shower.  Do not douche, use tampons, or resume intercourse for 2-4 weeks.    Emergency Care  Contact your physician if you have any of these problems:   1.  A fever over 100.4 degree Fahrenheit   2.  A large amount of bleeding or drainage   3.  Severe pain              4.  Foul vaginal discharge    Follow Up  Follow up with Dr. Mills as scheduled.       **If you have questions or concerns about your procedure,   call Dr. Mills at  [phone]**

## 2023-07-11 NOTE — ANESTHESIA CARE TRANSFER NOTE
Patient: Angela Story    Procedure: Procedure(s):  HYSTEROSCOPY, WITH DILATION AND CURETTAGE OF UTERUS USING MYOSURE MORCELLATOR       Diagnosis: Thickened endometrium [R93.89]  Diagnosis Additional Information: No value filed.    Anesthesia Type:   MAC     Note:    Oropharynx: oropharynx clear of all foreign objects and spontaneously breathing  Level of Consciousness: awake  Oxygen Supplementation: face mask  Level of Supplemental Oxygen (L/min / FiO2): 6  Independent Airway: airway patency satisfactory and stable  Dentition: dentition unchanged  Vital Signs Stable: post-procedure vital signs reviewed and stable  Report to RN Given: handoff report given  Patient transferred to: PACU    Handoff Report: Identifed the Patient, Identified the Reponsible Provider, Reviewed the pertinent medical history, Discussed the surgical course, Reviewed Intra-OP anesthesia mangement and issues during anesthesia, Set expectations for post-procedure period and Allowed opportunity for questions and acknowledgement of understanding      Vitals:  Vitals Value Taken Time   /71 07/11/23 0902   Temp 36.6  C (97.8  F) 07/11/23 0902   Pulse 76 07/11/23 0906   Resp 12 07/11/23 0906   SpO2 98 % 07/11/23 0906   Vitals shown include unvalidated device data.    Electronically Signed By: KAVITHA Murillo CRNA  July 11, 2023  9:06 AM

## 2023-07-11 NOTE — ANESTHESIA POSTPROCEDURE EVALUATION
Patient: Angela Story    Procedure: Procedure(s):  HYSTEROSCOPY, WITH DILATION AND CURETTAGE OF UTERUS USING MYOSURE MORCELLATOR       Anesthesia Type:  MAC    Note:  Disposition: Inpatient   Postop Pain Control: Uneventful            Sign Out: Well controlled pain   PONV: No   Neuro/Psych: Uneventful            Sign Out: Acceptable/Baseline neuro status   Airway/Respiratory: Uneventful            Sign Out: Acceptable/Baseline resp. status   CV/Hemodynamics: Uneventful            Sign Out: Acceptable CV status; No obvious hypovolemia; No obvious fluid overload   Other NRE: NONE   DID A NON-ROUTINE EVENT OCCUR?            Last vitals:  Vitals Value Taken Time   /94 07/11/23 0940   Temp 36.4  C (97.6  F) 07/11/23 0930   Pulse 76 07/11/23 0933   Resp 16 07/11/23 0940   SpO2 94 % 07/11/23 0940   Vitals shown include unvalidated device data.    Electronically Signed By: Jayne Ramos  July 11, 2023  1:59 PM

## 2023-07-11 NOTE — INTERVAL H&P NOTE
"I have reviewed the surgical (or preoperative) H&P that is linked to this encounter, and examined the patient. There are no significant changes    Clinical Conditions Present on Arrival:  Clinically Significant Risk Factors Present on Admission                  # Obesity: Estimated body mass index is 34.94 kg/m  as calculated from the following:    Height as of this encounter: 1.549 m (5' 1\").    Weight as of this encounter: 83.9 kg (184 lb 14.4 oz).       "

## 2023-07-11 NOTE — ANESTHESIA PREPROCEDURE EVALUATION
Anesthesia Pre-Procedure Evaluation    Patient: Angela Story   MRN: [id number] : 1950        Procedure : Procedure(s):  HYSTEROSCOPY, WITH DILATION AND CURETTAGE OF UTERUS USING MYOSURE MORCELLATOR          Past Medical History:   Diagnosis Date     Bilateral bunions 2014     CKD (chronic kidney disease) stage 3, GFR 30-59 ml/min (H)      Gastro-oesophageal reflux disease      Hyperlipidemia LDL goal <100 2010     Hypertension goal BP (blood pressure) < 140/90 2013     IFG (impaired fasting glucose) 2016     Osteoarthritis of acromioclavicular joint      PONV (postoperative nausea and vomiting)      Thyroid nodule 2015      Past Surgical History:   Procedure Laterality Date     BIOPSY      Breast lump     BREAST SURGERY      See above     BUNIONECTOMY  2014    Procedure: BUNIONECTOMY;  Surgeon: Kevin Rodas DPM;  Location: [facility] OR     COLONOSCOPY  ; 2018     COMBINED REPAIR PTOSIS WITH BLEPHAROPLASTY Bilateral 2021    Procedure: Bilateral upper eyelid blepharoplasty wtih ptosis repair;  Surgeon: Gregor Harris MD;  Location: [facility] OR     EXCISE MASS BACK  2014    Procedure: EXCISE MASS BACK;  Surgeon: Chaim Lacy MD;  Location: [facility] OR     EYE SURGERY      ?     HC DILATION/CURETTAGE DIAG/THER NON OB  1982    D & C     REPLACEMENT TOTAL KNEE       surgery for ptosis of both eyelids       TONSILLECTOMY  1966    tonsillectomy     ZZC  DELIVERY ONLY  1980    , Low Cervical     ZZC NONSPECIFIC PROCEDURE  1986    lumpectomy, lt     ZZC NONSPECIFIC PROCEDURE      ob      ZZC NONSPECIFIC PROCEDURE  1983    Vaginal delivery x 1     ZZHC ARTHROTOMY W/OPEN MENISCUS REPAIR  2007    lt knee      Allergies   Allergen Reactions     Norco [Hydrocodone-Acetaminophen] Dizziness and Nausea and Vomiting     Eggs Nausea and Vomiting and Diarrhea     Guaifenesin Hives     Guaifenesin Er Hives     Lisinopril Cough     Penicillins Rash      Sulfa Antibiotics Hives     Sulfites Nausea and Vomiting and Diarrhea     Sulfametrole Rash      Social History     Tobacco Use     Smoking status: Never     Smokeless tobacco: Never   Substance Use Topics     Alcohol use: Yes     Comment: 1 per week      Wt Readings from Last 1 Encounters:   07/11/23 83.9 kg (184 lb 14.4 oz)        Anesthesia Evaluation   Pt has had prior anesthetic.     History of anesthetic complications  - PONV.      ROS/MED HX  ENT/Pulmonary:    (-) tobacco use, asthma and sleep apnea   Neurologic:       Cardiovascular:     (+) hypertension-----    METS/Exercise Tolerance:     Hematologic:       Musculoskeletal:       GI/Hepatic:     (+) GERD, Asymptomatic on medication,     Renal/Genitourinary:     (+) renal disease, type: CRI,     Endo:     (+) thyroid problem, Obesity,     Psychiatric/Substance Use:       Infectious Disease:       Malignancy:       Other:            Physical Exam    Airway        Mallampati: II   TM distance: > 3 FB   Neck ROM: full   Mouth opening: > 3 cm    Respiratory Devices and Support         Dental       (+) Completely normal teeth      Cardiovascular   cardiovascular exam normal          Pulmonary   pulmonary exam normal                OUTSIDE LABS:  CBC:   Lab Results   Component Value Date    WBC 6.7 05/01/2023    WBC 6.0 02/06/2023    HGB 13.0 05/01/2023    HGB 13.6 02/06/2023    HCT 39.9 05/01/2023    HCT 41.9 02/06/2023     05/01/2023     02/06/2023     BMP:   Lab Results   Component Value Date     02/06/2023     11/23/2022    POTASSIUM 4.3 02/06/2023    POTASSIUM 4.0 11/23/2022    CHLORIDE 104 02/06/2023    CHLORIDE 103 11/23/2022    CO2 26 02/06/2023    CO2 23 11/23/2022    BUN 11.4 02/06/2023    BUN 14.9 11/23/2022    CR 0.98 (H) 02/06/2023    CR 0.86 11/23/2022     (H) 02/06/2023     (H) 11/23/2022     COAGS: No results found for: PTT, INR, FIBR  POC: No results found for: BGM, HCG, HCGS  HEPATIC:   Lab Results    Component Value Date    ALBUMIN 4.0 08/29/2022    PROTTOTAL 6.9 08/29/2022    ALT 27 08/29/2022    AST 21 08/29/2022    ALKPHOS 55 08/29/2022    BILITOTAL 0.8 08/29/2022     OTHER:   Lab Results   Component Value Date    A1C 5.8 (H) 01/18/2021    ANGELICA 9.6 02/06/2023    PHOS 3.5 08/29/2022    TSH 2.97 02/06/2023    T4 1.00 06/23/2020    T3 128 11/29/2019       Anesthesia Plan    ASA Status:  2      Anesthesia Type: MAC.              Consents    Anesthesia Plan(s) and associated risks, benefits, and realistic alternatives discussed. Questions answered and patient/representative(s) expressed understanding.    - Discussed:     - Discussed with:  Patient         Postoperative Care    Pain management: IV analgesics, Oral pain medications.   PONV prophylaxis: Ondansetron (or other 5HT-3), Dexamethasone or Solumedrol     Comments:                Jayne Ramos

## 2023-07-11 NOTE — OP NOTE
HYSTEROSCOPY, D&C OPERATIVE REPORT    DATE OF PROCEDURE: July 11, 2023  PREOPERATIVE DIAGNOSES: PMB, thickened endometrium  POSTOPERATIVE DIAGNOSES: same  PROCEDURE: Suction dilation and curettage  SURGEON: Puja Mills DO  ANESTHESIA: MAC, local  COMPLICATIONS: None  ESTIMATED BLOOD LOSS: 5mL  IVF: 500mL  Deficit: 145mL  FINDINGS: Atrophic, thin appearing, age appropriate endometrium without visible abnormality  SPECIMENS: endometrial curettings    INDICATION: 74yo PM female with episode of PMB. EMB insufficient, ultrasound showed ES 9mm. Consented for D&C due to mismatch between sample obtained and measured endometrium and lack of diagnostic conclusion from EMB. Risks, benefits, and alternatives of the procedure was discussed and informed consent was signed.    PROCEDURE:  The patient was taken to the operating room. She was prepped and draped in a normal sterile fashion in the dorsal lithotomy position under MAC anesthesia. Narrow betadine soaked sponge stick use to cleanse within vagina due to narrow, PM female anatomy. Narrow graves' speculum was placed in vagina. Further cervical cleansing with betadine and large cotton swab.  Quarter percent marcacaine w/o epinephrine circumferentially injected into cervix. Anterior lip of the cervix was grasped with single-tooth tenaculum.  The cervix was serially dilated to 5 mm. Hysteroscope introduced and cavity explored. Above findings observed. Pictures taken. Hysteroscope removed. Sharp curettage performed with small tissue return, commensurate with visible observation. Sample sent to pathology for evaluation.  Tenaculum removed. Cervix hemostatic with silver nitrate.  The counts were correct x2. The patient was woken from anesthesia and taken to recovery recovery in a good condition.    Puja Mills DO  July 11, 2023

## 2023-07-12 PROCEDURE — 88305 TISSUE EXAM BY PATHOLOGIST: CPT | Mod: 26 | Performed by: PATHOLOGY

## 2023-07-25 DIAGNOSIS — B37.9 CANDIDA INFECTION: ICD-10-CM

## 2023-07-25 RX ORDER — NYSTATIN 100000 [USP'U]/G
POWDER TOPICAL 2 TIMES DAILY
Qty: 60 G | Refills: 1 | Status: SHIPPED | OUTPATIENT
Start: 2023-07-25 | End: 2023-09-06

## 2023-08-23 ENCOUNTER — PATIENT OUTREACH (OUTPATIENT)
Dept: CARE COORDINATION | Facility: CLINIC | Age: 73
End: 2023-08-23
Payer: COMMERCIAL

## 2023-09-06 DIAGNOSIS — B37.9 CANDIDA INFECTION: ICD-10-CM

## 2023-09-06 RX ORDER — NYSTATIN 100000 [USP'U]/G
POWDER TOPICAL 2 TIMES DAILY
Qty: 60 G | Refills: 1 | Status: SHIPPED | OUTPATIENT
Start: 2023-09-06

## 2023-09-20 ENCOUNTER — PATIENT OUTREACH (OUTPATIENT)
Dept: CARE COORDINATION | Facility: CLINIC | Age: 73
End: 2023-09-20
Payer: COMMERCIAL

## 2023-09-26 ASSESSMENT — ENCOUNTER SYMPTOMS
HEMATOCHEZIA: 0
PALPITATIONS: 0
ABDOMINAL PAIN: 0
FREQUENCY: 0
SORE THROAT: 0
NERVOUS/ANXIOUS: 0
FEVER: 0
HEADACHES: 0
JOINT SWELLING: 0
DIARRHEA: 0
HEMATURIA: 0
DIZZINESS: 0
DYSURIA: 0
PARESTHESIAS: 0
SHORTNESS OF BREATH: 0
CHILLS: 0
NAUSEA: 0
MYALGIAS: 0
WEAKNESS: 0
CONSTIPATION: 0
BREAST MASS: 0
HEARTBURN: 0
EYE PAIN: 0
ARTHRALGIAS: 0
COUGH: 0

## 2023-09-26 ASSESSMENT — ACTIVITIES OF DAILY LIVING (ADL): CURRENT_FUNCTION: NO ASSISTANCE NEEDED

## 2023-09-27 NOTE — TELEPHONE ENCOUNTER
Outpatient Medication Detail     Disp Refills Start End RACHEL   citalopram (CELEXA) 20 MG tablet 90 tablet 3 2/6/2023  No   Sig - Route: Take 1 tablet (20 mg) by mouth daily - Oral   Sent to pharmacy as: Citalopram Hydrobromide 20 MG Oral Tablet (celeXA)   Class: E-Prescribe   Order: [id number]   E-Prescribing Status: Receipt confirmed by pharmacy (2/6/2023 10:53 AM CST)     Pharmacy    [facility] - [place], [place] - [address] AT [address]     Associated Diagnoses    Generalized anxiety disorder [F41.1]        [facility] faxing for refill of citalopram 10mg; patient on 20mg dose increased 11-; per OV notes:  Generalized anxiety disorder  -     citalopram (CELEXA) 20 MG tablet; Take 1 tablet (20 mg) by mouth daily  She wants to increase the dose of Celexa from 10 mg to 20  That worked better for her and kept her blood pressure under good control  Her anxiety was under much better control with higher dose  She would let me know how that works for her    Fax sent back to pharmacy: patient should be taking 20mg dose, Rx not due to renew

## 2023-10-03 ENCOUNTER — OFFICE VISIT (OUTPATIENT)
Dept: FAMILY MEDICINE | Facility: CLINIC | Age: 73
End: 2023-10-03
Payer: COMMERCIAL

## 2023-10-03 VITALS
RESPIRATION RATE: 16 BRPM | SYSTOLIC BLOOD PRESSURE: 125 MMHG | DIASTOLIC BLOOD PRESSURE: 76 MMHG | WEIGHT: 190.5 LBS | HEIGHT: 61 IN | HEART RATE: 68 BPM | BODY MASS INDEX: 35.97 KG/M2 | TEMPERATURE: 98.3 F | OXYGEN SATURATION: 97 %

## 2023-10-03 DIAGNOSIS — Z96.653 S/P TOTAL KNEE REPLACEMENT, BILATERAL: ICD-10-CM

## 2023-10-03 DIAGNOSIS — Z23 NEED FOR PROPHYLACTIC VACCINATION AND INOCULATION AGAINST INFLUENZA: ICD-10-CM

## 2023-10-03 DIAGNOSIS — Z79.899 MEDICATION MANAGEMENT: ICD-10-CM

## 2023-10-03 DIAGNOSIS — Z23 HIGH PRIORITY FOR 2019-NCOV VACCINE: ICD-10-CM

## 2023-10-03 DIAGNOSIS — I10 BENIGN HYPERTENSION: ICD-10-CM

## 2023-10-03 DIAGNOSIS — E78.5 HYPERLIPIDEMIA, UNSPECIFIED HYPERLIPIDEMIA TYPE: ICD-10-CM

## 2023-10-03 DIAGNOSIS — N18.31 STAGE 3A CHRONIC KIDNEY DISEASE (H): ICD-10-CM

## 2023-10-03 DIAGNOSIS — Z00.00 ENCOUNTER FOR MEDICARE ANNUAL WELLNESS EXAM: Primary | ICD-10-CM

## 2023-10-03 DIAGNOSIS — F41.1 GENERALIZED ANXIETY DISORDER: ICD-10-CM

## 2023-10-03 DIAGNOSIS — E06.3 HYPOTHYROIDISM DUE TO HASHIMOTO'S THYROIDITIS: ICD-10-CM

## 2023-10-03 DIAGNOSIS — Z12.11 SCREEN FOR COLON CANCER: ICD-10-CM

## 2023-10-03 DIAGNOSIS — E66.01 MORBID OBESITY (H): ICD-10-CM

## 2023-10-03 DIAGNOSIS — Z87.898 H/O MOTION SICKNESS: ICD-10-CM

## 2023-10-03 PROCEDURE — 80061 LIPID PANEL: CPT | Performed by: INTERNAL MEDICINE

## 2023-10-03 PROCEDURE — 82043 UR ALBUMIN QUANTITATIVE: CPT | Performed by: INTERNAL MEDICINE

## 2023-10-03 PROCEDURE — G0008 ADMIN INFLUENZA VIRUS VAC: HCPCS | Performed by: INTERNAL MEDICINE

## 2023-10-03 PROCEDURE — 36415 COLL VENOUS BLD VENIPUNCTURE: CPT | Performed by: INTERNAL MEDICINE

## 2023-10-03 PROCEDURE — 90662 IIV NO PRSV INCREASED AG IM: CPT | Performed by: INTERNAL MEDICINE

## 2023-10-03 PROCEDURE — 84443 ASSAY THYROID STIM HORMONE: CPT | Performed by: INTERNAL MEDICINE

## 2023-10-03 PROCEDURE — 90480 ADMN SARSCOV2 VAC 1/ONLY CMP: CPT | Performed by: INTERNAL MEDICINE

## 2023-10-03 PROCEDURE — 99214 OFFICE O/P EST MOD 30 MIN: CPT | Mod: 25 | Performed by: INTERNAL MEDICINE

## 2023-10-03 PROCEDURE — 91320 SARSCV2 VAC 30MCG TRS-SUC IM: CPT | Performed by: INTERNAL MEDICINE

## 2023-10-03 PROCEDURE — 80053 COMPREHEN METABOLIC PANEL: CPT | Performed by: INTERNAL MEDICINE

## 2023-10-03 PROCEDURE — G0439 PPPS, SUBSEQ VISIT: HCPCS | Performed by: INTERNAL MEDICINE

## 2023-10-03 PROCEDURE — 82570 ASSAY OF URINE CREATININE: CPT | Performed by: INTERNAL MEDICINE

## 2023-10-03 RX ORDER — PRAVASTATIN SODIUM 20 MG
20 TABLET ORAL DAILY
Qty: 90 TABLET | Refills: 3 | Status: SHIPPED | OUTPATIENT
Start: 2023-10-03 | End: 2024-08-29

## 2023-10-03 RX ORDER — AMLODIPINE BESYLATE 2.5 MG/1
2.5 TABLET ORAL 2 TIMES DAILY
Qty: 180 TABLET | Refills: 3 | Status: SHIPPED | OUTPATIENT
Start: 2023-10-03 | End: 2024-08-29

## 2023-10-03 RX ORDER — CITALOPRAM HYDROBROMIDE 20 MG/1
20 TABLET ORAL DAILY
Qty: 90 TABLET | Refills: 3 | Status: SHIPPED | OUTPATIENT
Start: 2023-10-03 | End: 2024-08-29

## 2023-10-03 RX ORDER — SCOLOPAMINE TRANSDERMAL SYSTEM 1 MG/1
PATCH, EXTENDED RELEASE TRANSDERMAL
Qty: 4 PATCH | Refills: 11 | Status: SHIPPED | OUTPATIENT
Start: 2023-10-03 | End: 2023-10-05

## 2023-10-03 ASSESSMENT — ENCOUNTER SYMPTOMS
MYALGIAS: 0
CHILLS: 0
EYE PAIN: 0
JOINT SWELLING: 0
BREAST MASS: 0
HEADACHES: 0
WEAKNESS: 0
FEVER: 0
HEMATOCHEZIA: 0
HEARTBURN: 0
DIARRHEA: 0
SHORTNESS OF BREATH: 0
DIZZINESS: 0
NAUSEA: 0
ARTHRALGIAS: 0
NERVOUS/ANXIOUS: 0
ABDOMINAL PAIN: 0
PARESTHESIAS: 0
CONSTIPATION: 0
COUGH: 0
SORE THROAT: 0
FREQUENCY: 0
DYSURIA: 0
PALPITATIONS: 0
HEMATURIA: 0

## 2023-10-03 ASSESSMENT — ACTIVITIES OF DAILY LIVING (ADL): CURRENT_FUNCTION: NO ASSISTANCE NEEDED

## 2023-10-03 ASSESSMENT — PAIN SCALES - GENERAL: PAINLEVEL: MILD PAIN (2)

## 2023-10-03 NOTE — PATIENT INSTRUCTIONS
Avoid OTC NSAIDS like ibuprofen,motrin or aleve  They interact with your antidepressant and increases the risk of gastrointestinal bleeding  So use NSAIDs sparingly with food  If you use only over-the-counter NSAID then make sure you take over-the-counter omeprazole to soothe your stomach.  Over-the-counter Tylenol is a safer option to use for pain    Flu shot and covid booster today  RSV through pharmacy    Follow up in one year for physical   Seek sooner medical attention if there is any worsening of symptoms or problems.     Patient Education   Personalized Prevention Plan  You are due for the preventive services outlined below.  Your care team is available to assist you in scheduling these services.  If you have already completed any of these items, please share that information with your care team to update in your medical record.  Health Maintenance Due   Topic Date Due    Colorectal Cancer Screening  03/27/2023    Kidney Microalbumin Urine Test  08/29/2023    ANNUAL REVIEW OF HM ORDERS  08/29/2023    Flu Vaccine (1) 09/01/2023    COVID-19 Vaccine (7 - 2023-24 season) 09/01/2023    Mammogram  09/22/2023     Hearing Loss: Care Instructions  Overview     Hearing loss is a sudden or slow decrease in how well you hear. It can range from slight to profound. Permanent hearing loss can occur with aging. It also can happen when you are exposed long-term to loud noise. Examples include listening to loud music, riding motorcycles, or being around other loud machines.  Hearing loss can affect your work and home life. It can make you feel lonely or depressed. You may feel that you have lost your independence. But hearing aids and other devices can help you hear better and feel connected to others.  Follow-up care is a key part of your treatment and safety. Be sure to make and go to all appointments, and call your doctor if you are having problems. It's also a good idea to know your test results and keep a list of the  medicines you take.  How can you care for yourself at home?  Avoid loud noises whenever possible. This helps keep your hearing from getting worse.  Always wear hearing protection around loud noises.  Wear a hearing aid as directed.  A professional can help you pick a hearing aid that will work best for you.  You can also get hearing aids over the counter for mild to moderate hearing loss.  Have hearing tests as your doctor suggests. They can show whether your hearing has changed. Your hearing aid may need to be adjusted.  Use other devices as needed. These may include:  Telephone amplifiers and hearing aids that can connect to a television, stereo, radio, or microphone.  Devices that use lights or vibrations. These alert you to the doorbell, a ringing telephone, or a baby monitor.  Television closed-captioning. This shows the words at the bottom of the screen. Most new TVs can do this.  TTY (text telephone). This lets you type messages back and forth on the telephone instead of talking or listening. These devices are also called TDD. When messages are typed on the keyboard, they are sent over the phone line to a receiving TTY. The message is shown on a monitor.  Use text messaging, social media, and email if it is hard for you to communicate by telephone.  Try to learn a listening technique called speechreading. It is not lipreading. You pay attention to people's gestures, expressions, posture, and tone of voice. These clues can help you understand what a person is saying. Face the person you are talking to, and have them face you. Make sure the lighting is good. You need to see the other person's face clearly.  Think about counseling if you need help to adjust to your hearing loss.  When should you call for help?  Watch closely for changes in your health, and be sure to contact your doctor if:    You think your hearing is getting worse.     You have new symptoms, such as dizziness or nausea.   Where can you learn  "more?  Go to https://www.Wink.net/patiented  Enter R798 in the search box to learn more about \"Hearing Loss: Care Instructions.\"  Current as of: March 1, 2023               Content Version: 13.7    9174-4466 [employer].   Care instructions adapted under license by your healthcare professional. If you have questions about a medical condition or this instruction, always ask your healthcare professional. [employer] disclaims any warranty or liability for your use of this information.         "

## 2023-10-03 NOTE — PROGRESS NOTES
"SUBJECTIVE:   Angela is a 73 year old who presents for Preventive Visit.      Are you in the first 12 months of your Medicare coverage?  No    Healthy Habits:     In general, how would you rate your overall health?  Excellent    Frequency of exercise:  6-7 days/week    Duration of exercise:  45-60 minutes    Do you usually eat at least 4 servings of fruit and vegetables a day, include whole grains    & fiber and avoid regularly eating high fat or \"junk\" foods?  Yes    Taking medications regularly:  Yes    Ability to successfully perform activities of daily living:  No assistance needed    Home Safety:  Lack of grab bars in the bathroom    Hearing Impairment:  Difficulty understanding soft or whispered speech    In the past 6 months, have you been bothered by leaking of urine?  No    In general, how would you rate your overall mental or emotional health?  Excellent    Additional concerns today:  No          Have you ever done Advance Care Planning? (For example, a Health Directive, POLST, or a discussion with a medical provider or your loved ones about your wishes): Yes, advance care planning is on file.       Fall risk  Fallen 2 or more times in the past year?: No  Any fall with injury in the past year?: No    Cognitive Screening   1) Repeat 3 items (Leader, Season, Table)    2) Clock draw: NORMAL  3) 3 item recall: Recalls 3 objects  Results: 3 items recalled: COGNITIVE IMPAIRMENT LESS LIKELY    Mini-CogTM Copyright ROSARIO Guerrero. Licensed by the author for use in [facility]; reprinted with permission (fifi@.[facility]). All rights reserved.      Do you have sleep apnea, excessive snoring or daytime drowsiness? : no    Reviewed and updated as needed this visit by clinical staff   Tobacco  Allergies  Meds              Reviewed and updated as needed this visit by Provider                 Social History     Tobacco Use    Smoking status: Never    Smokeless tobacco: Never   Substance Use Topics    Alcohol use: " Yes     Comment: 1 per week             9/26/2023     9:05 AM   Alcohol Use   Prescreen: >3 drinks/day or >7 drinks/week? No     Do you have a current opioid prescription? No  Do you use any other controlled substances or medications that are not prescribed by a provider? None              Current providers sharing in care for this patient include:   Patient Care Team:  Merary Geller MD as PCP - General (Internal Medicine)  Satish Scott LMFT as Other (see comments) (Therapist)  Raphael Aguero MD as MD (Ophthalmology)  Merary Geller MD as Assigned PCP  Frandy Estes MD as MD (Dermatology)  Raphael Aguero MD as Assigned Surgical Provider  s, Puja Banks DO as Assigned OBGYN Provider    The following health maintenance items are reviewed in Epic and correct as of today:  Health Maintenance   Topic Date Due    COLORECTAL CANCER SCREENING  03/27/2023    MICROALBUMIN  08/29/2023    MAMMO SCREENING  09/22/2023    BMP  02/06/2024    TSH W/FREE T4 REFLEX  02/20/2024    LIPID  05/01/2024    HEMOGLOBIN  07/11/2024    MEDICARE ANNUAL WELLNESS VISIT  10/03/2024    ANNUAL REVIEW OF HM ORDERS  10/03/2024    FALL RISK ASSESSMENT  10/03/2024    DTAP/TDAP/TD IMMUNIZATION (3 - Td or Tdap) 07/24/2027    ADVANCE CARE PLANNING  10/03/2028    DEXA  09/22/2037    HEPATITIS C SCREENING  Completed    PHQ-2 (once per calendar year)  Completed    INFLUENZA VACCINE  Completed    Pneumococcal Vaccine: 65+ Years  Completed    URINALYSIS  Completed    ZOSTER IMMUNIZATION  Completed    COVID-19 Vaccine  Completed    IPV IMMUNIZATION  Aged Out    HPV IMMUNIZATION  Aged Out    MENINGITIS IMMUNIZATION  Aged Out             Review of Systems   Constitutional:  Negative for chills and fever.   HENT:  Negative for congestion, ear pain, hearing loss and sore throat.    Eyes:  Negative for pain and visual disturbance.   Respiratory:  Negative for cough and shortness of breath.    Cardiovascular:  Negative for chest pain,  "palpitations and peripheral edema.   Gastrointestinal:  Negative for abdominal pain, constipation, diarrhea, heartburn, hematochezia and nausea.   Breasts:  Negative for tenderness, breast mass and discharge.   Genitourinary:  Negative for dysuria, frequency, genital sores, hematuria, pelvic pain, urgency, vaginal bleeding and vaginal discharge.   Musculoskeletal:  Negative for arthralgias, joint swelling and myalgias.   Skin:  Negative for rash.   Neurological:  Negative for dizziness, weakness, headaches and paresthesias.   Psychiatric/Behavioral:  Negative for mood changes. The patient is not nervous/anxious.          OBJECTIVE:   /76 (BP Location: Right arm, Patient Position: Sitting, Cuff Size: Adult Large)   Pulse 68   Temp 98.3  F (36.8  C) (Oral)   Resp 16   Ht 1.549 m (5' 1\")   Wt 86.4 kg (190 lb 8 oz)   LMP 07/23/2004   SpO2 97%   BMI 35.99 kg/m   Estimated body mass index is 35.99 kg/m  as calculated from the following:    Height as of this encounter: 1.549 m (5' 1\").    Weight as of this encounter: 86.4 kg (190 lb 8 oz).  Physical Exam  GENERAL APPEARANCE: healthy, alert and no distress  EYES: Eyes grossly normal to inspection, PERRL and conjunctivae and sclerae normal  HENT: ear canals and TM's normal, nose and mouth without ulcers or lesions, oropharynx clear and oral mucous membranes moist  NECK: no adenopathy,   RESP: lungs clear to auscultation - no rales, rhonchi or wheezes  BREAST: normal without masses, tenderness or nipple discharge and no palpable axillary masses or adenopathy  CV: regular rate and rhythm, normal S1 S2, no S3   ABDOMEN: soft, nontender, no hepatosplenomegaly, no masses and bowel sounds normal  MS: no musculoskeletal defects are noted and gait is age appropriate without ataxia.   SKIN: no suspicious lesions or rashes.   Knee replacement surgery scar on both knees  NEUROy normal, mentation intact and speech normal  PSYCH: mentation appears normal and affect " normal/bright  She has mild peripheral edema and some prominent veins    Scheduled for RSV vaccine   Diagnostic Test Results:  Labs reviewed in Epic    ASSESSMENT / PLAN:   Angela was seen today for physical, imm/inj and imm/inj.    Diagnoses and all orders for this visit:    Encounter for Medicare annual wellness exam  Preventive health counseling was also done.  She is getting flu and COVID vaccine here and she is scheduled for RSV vaccine at pharmacy    Screen for colon cancer  -     Colonoscopy Screening  Referral; Future    Stage 3a chronic kidney disease (H)  -     Albumin Random Urine Quantitative with Creat Ratio; Future  -     Albumin Random Urine Quantitative with Creat Ratio    Benign hypertension  -     amLODIPine (NORVASC) 2.5 MG tablet; Take 1 tablet (2.5 mg) by mouth 2 times daily for Blood Pressure  Well controlled    Generalized anxiety disorder  -     citalopram (CELEXA) 20 MG tablet; Take 1 tablet (20 mg) by mouth daily  She was taking only 10 mg  She requested to increase to 20 given  My impression months she is on 20 mg but in fact she was using her previous prescription  So now she wants to increase  She understands the prescription is sent    Hyperlipidemia, unspecified hyperlipidemia type  -     pravastatin (PRAVACHOL) 20 MG tablet; Take 1 tablet (20 mg) by mouth daily for cholestrol  -     Lipid panel reflex to direct LDL Fasting; Future  -     Lipid panel reflex to direct LDL Fasting    S/p total knee replacement, bilateral  Past history    Morbid obesity (H)  We discussed the management  I discussed Wegovy  She will get back to me when it becomes available    Hypothyroidism due to Hashimoto's thyroiditis  -     TSH with free T4 reflex; Future  -     TSH with free T4 reflex    Medication management  -     Comprehensive metabolic panel (BMP + Alb, Alk Phos, ALT, AST, Total. Bili, TP); Future  -     Comprehensive metabolic panel (BMP + Alb, Alk Phos, ALT, AST, Total. Bili, TP)    H/O  "motion sickness  -     scopolamine (TRANSDERM) 1 MG/3DAYS 72 hr patch; Apply 1 patch to hairless area behind one ear at least 4 hours before travel.  Remove old patch and change every 3 days (72 hours).    High priority for 2019-nCoV vaccine    Need for prophylactic vaccination and inoculation against influenza    Other orders  -     REVIEW OF HEALTH MAINTENANCE PROTOCOL ORDERS  -     PRIMARY CARE FOLLOW-UP SCHEDULING; Future  -     COVID-19 12+ (2023-24) (PFIZER)  -     INFLUENZA VACCINE 65+ (FLUZONE HD)        Patient has been advised of split billing requirements and indicates understanding: Yes      COUNSELING:  Reviewed preventive health counseling, as reflected in patient instructions       Regular exercise       Healthy diet/nutrition      BMI:   Estimated body mass index is 35.99 kg/m  as calculated from the following:    Height as of this encounter: 1.549 m (5' 1\").    Weight as of this encounter: 86.4 kg (190 lb 8 oz).   Weight management plan: Discussed healthy diet and exercise guidelines      She reports that she has never smoked. She has never used smokeless tobacco.      Appropriate preventive services were discussed with this patient, including applicable screening as appropriate for fall prevention, nutrition, physical activity, Tobacco-use cessation, weight loss and cognition.  Checklist reviewing preventive services available has been given to the patient.    Reviewed patients plan of care and provided an AVS. The Basic Care Plan (routine screening as documented in Health Maintenance) for Angela meets the Care Plan requirement. This Care Plan has been established and reviewed with the Patient.          Merary Geller MD  [facility] [place]    Identified Health Risks:    "

## 2023-10-04 ENCOUNTER — HOSPITAL ENCOUNTER (OUTPATIENT)
Dept: MAMMOGRAPHY | Facility: CLINIC | Age: 73
Discharge: HOME | End: 2023-10-04
Attending: INTERNAL MEDICINE | Admitting: INTERNAL MEDICINE
Payer: COMMERCIAL

## 2023-10-04 DIAGNOSIS — Z12.31 VISIT FOR SCREENING MAMMOGRAM: ICD-10-CM

## 2023-10-04 LAB
ALBUMIN SERPL BCG-MCNC: 4.6 G/DL (ref 3.5–5.2)
ALP SERPL-CCNC: 98 U/L (ref 35–104)
ALT SERPL W P-5'-P-CCNC: 16 U/L (ref 0–50)
ANION GAP SERPL CALCULATED.3IONS-SCNC: 11 MMOL/L (ref 7–15)
AST SERPL W P-5'-P-CCNC: 26 U/L (ref 0–45)
BILIRUB SERPL-MCNC: 0.6 MG/DL
BUN SERPL-MCNC: 15.5 MG/DL (ref 8–23)
CALCIUM SERPL-MCNC: 9.9 MG/DL (ref 8.8–10.2)
CHLORIDE SERPL-SCNC: 103 MMOL/L (ref 98–107)
CHOLEST SERPL-MCNC: 186 MG/DL
CREAT SERPL-MCNC: 0.99 MG/DL (ref 0.51–0.95)
CREAT UR-MCNC: 21.6 MG/DL
DEPRECATED HCO3 PLAS-SCNC: 25 MMOL/L (ref 22–29)
EGFRCR SERPLBLD CKD-EPI 2021: 61 ML/MIN/1.73M2
GLUCOSE SERPL-MCNC: 98 MG/DL (ref 70–99)
HDLC SERPL-MCNC: 66 MG/DL
LDLC SERPL CALC-MCNC: 94 MG/DL
MICROALBUMIN UR-MCNC: <12 MG/L
MICROALBUMIN/CREAT UR: NORMAL MG/G{CREAT}
NONHDLC SERPL-MCNC: 120 MG/DL
POTASSIUM SERPL-SCNC: 4.4 MMOL/L (ref 3.4–5.3)
PROT SERPL-MCNC: 7 G/DL (ref 6.4–8.3)
SODIUM SERPL-SCNC: 139 MMOL/L (ref 135–145)
TRIGL SERPL-MCNC: 130 MG/DL
TSH SERPL DL<=0.005 MIU/L-ACNC: 2.52 UIU/ML (ref 0.3–4.2)

## 2023-10-04 PROCEDURE — 77067 SCR MAMMO BI INCL CAD: CPT

## 2023-10-04 NOTE — RESULT ENCOUNTER NOTE
Jessica Miller    This is to inform you regarding your test result.    The testing of your blood sugar, kidney function, liver function and electrolytes was satisfactory   Your total cholesterol is normal.  HDL which is called good cholesterol is normal.  Your LDL cholesterol is normal.  This is often call bad cholesterol and high levels increase the risk for heart attacks and strokes.  Your triglycerides are normal.  TSH which is thyroid hormone is normal.        Sincerely,      Dr.Nasima Yimi MD,FACP

## 2023-10-05 ENCOUNTER — TELEPHONE (OUTPATIENT)
Dept: FAMILY MEDICINE | Facility: CLINIC | Age: 73
End: 2023-10-05
Payer: COMMERCIAL

## 2023-10-05 DIAGNOSIS — Z87.898 H/O MOTION SICKNESS: ICD-10-CM

## 2023-10-05 RX ORDER — SCOLOPAMINE TRANSDERMAL SYSTEM 1 MG/1
PATCH, EXTENDED RELEASE TRANSDERMAL
Qty: 10 PATCH | Refills: 3 | Status: SHIPPED | OUTPATIENT
Start: 2023-10-05

## 2023-10-05 NOTE — TELEPHONE ENCOUNTER
Disp Refills Start End RACHEL   scopolamine (TRANSDERM) 1 MG/3DAYS 72 hr patch 4 patch 11 10/3/2023  No     Pharmacy seeking clarification on qty for scopolamine patches, pharmacy notes the patches come in pack size of 10

## 2023-10-13 ENCOUNTER — TELEPHONE (OUTPATIENT)
Dept: GASTROENTEROLOGY | Facility: CLINIC | Age: 73
End: 2023-10-13
Payer: COMMERCIAL

## 2023-10-13 DIAGNOSIS — Z12.11 SPECIAL SCREENING FOR MALIGNANT NEOPLASMS, COLON: Primary | ICD-10-CM

## 2023-10-13 NOTE — TELEPHONE ENCOUNTER
"Endoscopy Scheduling Screen    Have you had a positive Covid test in the last 14 days?  No    Are you active on MyChart?   Yes    What insurance is in the chart?  Other:  BCBS/MEDICARE    Ordering/Referring Provider: Merary Geller MD     (If ordering provider performs procedure, schedule with ordering provider unless otherwise instructed. )    BMI: Estimated body mass index is 35.99 kg/m  as calculated from the following:    Height as of 10/3/23: 1.549 m (5' 1\").    Weight as of 10/3/23: 86.4 kg (190 lb 8 oz).     Sedation Ordered  moderate sedation.   If patient BMI > 50 do not schedule in [facility].    If patient BMI > 45 do not schedule at [facility].    Are you taking methadone or Suboxone?  No    Are you taking any prescription medications for pain 3 or more times per week?   No    Do you have a history of malignant hyperthermia or adverse reaction to anesthesia?  No    (Females) Are you currently pregnant?   No     Have you been diagnosed or told you have pulmonary hypertension?   No    Do you have an LVAD?  No    Have you been told you have moderate to severe sleep apnea?  No    Have you been told you have COPD, asthma, or any other lung disease?  No    Do you have any heart conditions?  No     Have you ever had an organ transplant?   No    Have you ever had or are you awaiting a heart or lung transplant?   No    Have you had a stroke or transient ischemic attack (TIA aka \"mini stroke\" in the last 6 months?   No    Have you been diagnosed with or been told you have cirrhosis of the liver?   No    Are you currently on dialysis?   No    Do you need assistance transferring?   No    BMI: Estimated body mass index is 35.99 kg/m  as calculated from the following:    Height as of 10/3/23: 1.549 m (5' 1\").    Weight as of 10/3/23: 86.4 kg (190 lb 8 oz).     Is patients BMI > 40 and scheduling location [facility]?  No    Do you take an injectable medication for weight loss or diabetes (excluding insulin)?  No    Do you take the " medication Naltrexone?  No    Do you take blood thinners?  No       Prep   Are you currently on dialysis or do you have chronic kidney disease?  Yes (Golytely Prep)    Do you have a diagnosis of diabetes?  No    Do you have a diagnosis of cystic fibrosis (CF)?  No    On a regular basis do you go 3 -5 days between bowel movements?  No    BMI > 40?  No    Preferred Pharmacy:      [id number] - [place], [place] - [address] AT [facility] [address] [place] [address]  Phone: [phone] Fax: [phone]      Final Scheduling Details   Colonoscopy prep sent?  Standard Golytely    Procedure scheduled  Colonoscopy    Surgeon:  EMMA     Date of procedure:  12/13     Pre-OP / PAC:   No - Not required for this site.    Location  [facility] - Patient preference.    Sedation   Moderate Sedation - Per order.      Patient Reminders:   You will receive a call from a Nurse to review instructions and health history.  This assessment must be completed prior to your procedure.  Failure to complete the Nurse assessment may result in the procedure being cancelled.      On the day of your procedure, please designate an adult(s) who can drive you home stay with you for the next 24 hours. The medicines used in the exam will make you sleepy. You will not be able to drive.      You cannot take public transportation, ride share services, or non-medical taxi service without a responsible caregiver.  Medical transport services are allowed with the requirement that a responsible caregiver will receive you at your destination.  We require that drivers and caregivers are confirmed prior to your procedure.

## 2023-12-01 RX ORDER — BISACODYL 5 MG/1
TABLET, DELAYED RELEASE ORAL
Qty: 4 TABLET | Refills: 0 | Status: ON HOLD | OUTPATIENT
Start: 2023-12-01 | End: 2023-12-13

## 2023-12-07 RX ORDER — RIVAROXABAN 10 MG/1
10 TABLET, FILM COATED ORAL SEE ADMIN INSTRUCTIONS
Status: ON HOLD | COMMUNITY
Start: 2023-05-15 | End: 2023-12-13

## 2023-12-07 RX ORDER — ALBUTEROL SULFATE 90 UG/1
1-2 AEROSOL, METERED RESPIRATORY (INHALATION) EVERY 4 HOURS PRN
COMMUNITY
Start: 2022-11-10 | End: 2024-03-19

## 2023-12-07 RX ORDER — FLUCONAZOLE 150 MG/1
150 TABLET ORAL DAILY
COMMUNITY
Start: 2023-05-09 | End: 2024-03-19

## 2023-12-07 RX ORDER — HYDROXYZINE HYDROCHLORIDE 25 MG/1
25 TABLET, FILM COATED ORAL EVERY 6 HOURS PRN
COMMUNITY
Start: 2023-07-05 | End: 2024-03-19

## 2023-12-07 RX ORDER — CLINDAMYCIN HCL 300 MG
300 CAPSULE ORAL SEE ADMIN INSTRUCTIONS
COMMUNITY
Start: 2023-01-18

## 2023-12-07 RX ORDER — OXYCODONE HYDROCHLORIDE 5 MG/1
5 TABLET ORAL SEE ADMIN INSTRUCTIONS
Status: ON HOLD | COMMUNITY
Start: 2023-05-15 | End: 2023-12-13

## 2023-12-07 RX ORDER — ASPIRIN 81 MG/1
81 TABLET, COATED ORAL DAILY
COMMUNITY
Start: 2023-06-01 | End: 2024-03-19

## 2023-12-12 RX ORDER — ONDANSETRON 2 MG/ML
4 INJECTION INTRAMUSCULAR; INTRAVENOUS
Status: CANCELLED | OUTPATIENT
Start: 2023-12-12

## 2023-12-12 RX ORDER — LIDOCAINE 40 MG/G
CREAM TOPICAL
Status: CANCELLED | OUTPATIENT
Start: 2023-12-12

## 2023-12-13 ENCOUNTER — HOSPITAL ENCOUNTER (OUTPATIENT)
Facility: CLINIC | Age: 73
Discharge: HOME | End: 2023-12-13
Attending: COLON & RECTAL SURGERY | Admitting: COLON & RECTAL SURGERY
Payer: COMMERCIAL

## 2023-12-13 VITALS
OXYGEN SATURATION: 100 % | RESPIRATION RATE: 82 BRPM | SYSTOLIC BLOOD PRESSURE: 115 MMHG | HEIGHT: 61 IN | DIASTOLIC BLOOD PRESSURE: 65 MMHG | BODY MASS INDEX: 32.47 KG/M2 | HEART RATE: 57 BPM | WEIGHT: 172 LBS

## 2023-12-13 LAB — COLONOSCOPY: NORMAL

## 2023-12-13 PROCEDURE — G0121 COLON CA SCRN NOT HI RSK IND: HCPCS | Performed by: COLON & RECTAL SURGERY

## 2023-12-13 PROCEDURE — 250N000011 HC RX IP 250 OP 636: Performed by: COLON & RECTAL SURGERY

## 2023-12-13 PROCEDURE — G0500 MOD SEDAT ENDO SERVICE >5YRS: HCPCS | Performed by: COLON & RECTAL SURGERY

## 2023-12-13 PROCEDURE — 45378 DIAGNOSTIC COLONOSCOPY: CPT | Performed by: COLON & RECTAL SURGERY

## 2023-12-13 RX ORDER — FENTANYL CITRATE 50 UG/ML
INJECTION, SOLUTION INTRAMUSCULAR; INTRAVENOUS PRN
Status: DISCONTINUED | OUTPATIENT
Start: 2023-12-13 | End: 2023-12-13 | Stop reason: HOSPADM

## 2023-12-13 ASSESSMENT — ACTIVITIES OF DAILY LIVING (ADL): ADLS_ACUITY_SCORE: 35

## 2023-12-13 NOTE — H&P
Colon & Rectal Surgery History and Physical  Pre-Endoscopy Procedure Note    History of Present Illness   I have been asked by Dr. Geller to evaluate this 73 year old female for colorectal cancer screening. She currently denies any abdominal pain, weight loss, bleeding per rectum, or recent change in bowel habits.    Past Medical History  Diagnosis Date    Bilateral bunions 2014    CKD (chronic kidney disease) stage 3, GFR 30-59 ml/min     Gastro-oesophageal reflux disease     Hyperlipidemia LDL 2010    Hypertension 2013    IFG (impaired fasting glucose) 2016    Osteoarthritis of acromioclavicular joint     PONV (postoperative nausea and vomiting)     Thyroid nodule 2015       Past Surgical History  Procedure Laterality Date    BIOPSY      Breast lump    BUNIONECTOMY  2014    Procedure: BUNIONECTOMY;  Surgeon: Kevin Rodas DPM;  Location: [facility]    COMBINED REPAIR PTOSIS WITH BLEPHAROPLASTY Bilateral 2021    Procedure: Bilateral upper eyelid blepharoplasty wtih ptosis repair;  Surgeon: Gregor Harris MD;  Location: [facility] OR    DILATION AND CURETTAGE, OPERATIVE HYSTEROSCOPY WITH MORCELLATOR, COMBINED N/A 2023    Procedure: HYSTEROSCOPY, WITH DILATION AND CURETTAGE OF UTERUS USING MYOSURE MORCELLATOR;  Surgeon: Puja Mills DO;  Location: [facility]    EXCISE MASS BACK  2014    Procedure: EXCISE MASS BACK;  Surgeon: Chaim Lacy MD;  Location: [facility] OR    DILATION/CURETTAGE      D & C    REPLACEMENT TOTAL KNEE      surgery for ptosis of both eyelids      TONSILLECTOMY  1966    tonsillectomy     DELIVERY ONLY      , Low Cervical    ARTHROTOMY W/OPEN MENISCUS REPAIR      L knee        Medications  Medications Prior to Admission   Medication Sig    acetaminophen (TYLENOL) 500 MG tablet Take 2 tablets (1,000 mg) by mouth every 6 hours as needed for pain or fever    albuterol (PROAIR HFA/PROVENTIL HFA/VENTOLIN HFA) 108 (90  Base) MCG/ACT inhaler Inhale 1-2 puffs into the lungs every 4 hours as needed for wheezing    amLODIPine (NORVASC) 2.5 MG tablet Take 1 tablet (2.5 mg) by mouth 2 times daily for Blood Pressure    ASPIRIN LOW DOSE 81 MG EC tablet Take 81 mg by mouth daily    cholecalciferol 25 MCG (1000 UT) TABS Take 1,000 Units by mouth    citalopram (CELEXA) 20 MG tablet Take 1 tablet (20 mg) by mouth daily    clindamycin (CLEOCIN) 300 MG capsule Take 300 mg by mouth See Admin Instructions  TAKE 2 CAPSULES BY MOUTH 1 HOUR BEFORE DENTAL APPOINTMENT    fluconazole (DIFLUCAN) 150 MG tablet Take 150 mg by mouth daily    hydrOXYzine HCl (ATARAX) 25 MG tablet Take 25 mg by mouth every 6 hours as needed for other (pain or muscle spasm)    levothyroxine (SYNTHROID/LEVOTHROID) 25 MCG tablet Take 1.5 tablets (37.5 mcg) by mouth daily for thyroid    losartan (COZAAR) 50 MG tablet Take 1 tablet (50 mg) by mouth 2 times daily for Blood Pressure    NYSTOP [id number] UNIT/GM powder APPLY TOPICALLY 2 TIMES DAILY    omeprazole (PRILOSEC) 20 MG DR capsule Take 1 capsule (20 mg) by mouth daily as needed    pravastatin (PRAVACHOL) 20 MG tablet Take 1 tablet (20 mg) by mouth daily for cholestrol    scopolamine (TRANSDERM) 1 MG/3DAYS 72 hr patch Apply 1 patch to hairless area behind one ear at least 4 hours before travel.  Remove old patch and change every 3 days (72 hours).    Selenium 200 MCG TABS tablet Take 200 mcg by mouth daily    vitamin D3 (CHOLECALCIFEROL) 50 mcg (2000 units) tablet Take 1 tablet (50 mcg) by mouth daily       Allergies  Allergen Reactions    Norco [Hydrocodone-Acetaminophen] Dizziness and Nausea and Vomiting    Eggs Nausea and Vomiting and Diarrhea    Guaifenesin Hives    Lisinopril Cough    Penicillins Rash    Sulfa Antibiotics Hives    Sulfites Nausea and Vomiting and Diarrhea    Sulfametrole Rash        Family History   Family history includes Alcohol/Drug use in her father; Anesthesia Reaction in her sister; Cancer in her  "father; Cerebrovascular Disease in her paternal grandfather and paternal grandmother; Coronary Artery Disease in her maternal grandfather; Depression in her father; Heart Disease in her maternal grandfather; Hyperlipidemia in her brother, mother, sister, and sister; Hypertension in her brother, mother, sister, and sister;  Mental Illness in her sister; Neurologic Disorder in her sister and sister; Obesity in her brother and sister; Other Cancer in her father; Psychotic Disorder in her sister; Substance Abuse in her father; Thyroid Disease in her sister.     Social History   She reports that she has never smoked. She has never used smokeless tobacco. She reports current alcohol use. She reports that she does not use drugs.    Review of Systems   Constitutional:  No fever, weight change or fatigue.    Eyes:     No dry eyes or vision changes.   Ears/Nose/Throat/Neck:  No oral ulcers, sore throat or voice change.    Cardiovascular:   No palpitations, syncope, angina or edema.   Respiratory:    No chest pain, excessive sleepiness, shortness of breath or hemoptysis.    Gastrointestinal:   No abdominal pain, nausea, vomiting, diarrhea or heartburn.    Genitourinary:   No dysuria, hematuria, urinary retention or urinary frequency.   Musculoskeletal:  No joint swelling or arthralgias.    Dermatologic:  No skin rash or other skin changes.   Neurologic:    No focal weakness or numbness. No neuropathy.   Psychiatric:    No depression, anxiety, suicidal ideation, or paranoid ideation.   Endocrine:   No cold or heat intolerance, polydipsia, hirsutism, change in libido, or flushing.   Hematology/Lymphatic:  No bleeding or lymphadenopathy.    Allergy/Immunology:  No rhinitis or hives.     Physical Exam   Vitals:  Blood pressure 130/85, resp. rate 16, height 1.549 m (5' 1\"), weight 78 kg (172 lb), last menstrual period 07/23/2004, SpO2 99%, not currently breastfeeding.    General:  Alert and oriented to person, place and " time   Airway: Normal oropharyngeal airway and neck mobility   Lungs:  Clear bilaterally   Heart:  Regular rate and rhythm   Abdomen: Soft, NT, ND, no masses   Extremities: Warm, good capillary refill    ASA Grade: II (mild systemic disease)    Impression: Cleared for use of conscious sedation for colorectal cancer screening    Plan: Proceed with colonoscopy     Sierra Owens MD  [facility]  [phone]

## 2024-01-08 DIAGNOSIS — I10 BENIGN HYPERTENSION: ICD-10-CM

## 2024-01-09 RX ORDER — LOSARTAN POTASSIUM 50 MG/1
50 TABLET ORAL 2 TIMES DAILY
Qty: 180 TABLET | Refills: 3 | Status: SHIPPED | OUTPATIENT
Start: 2024-01-09

## 2024-03-18 ENCOUNTER — APPOINTMENT (OUTPATIENT)
Dept: CT IMAGING | Facility: CLINIC | Age: 74
End: 2024-03-18
Attending: BEHAVIOR TECHNICIAN
Payer: COMMERCIAL

## 2024-03-18 ENCOUNTER — APPOINTMENT (OUTPATIENT)
Dept: MRI IMAGING | Facility: CLINIC | Age: 74
End: 2024-03-18
Attending: BEHAVIOR TECHNICIAN
Payer: COMMERCIAL

## 2024-03-18 ENCOUNTER — NURSE TRIAGE (OUTPATIENT)
Dept: FAMILY MEDICINE | Facility: CLINIC | Age: 74
End: 2024-03-18

## 2024-03-18 ENCOUNTER — HOSPITAL ENCOUNTER (EMERGENCY)
Facility: CLINIC | Age: 74
Discharge: HOME | End: 2024-03-18
Attending: EMERGENCY MEDICINE | Admitting: EMERGENCY MEDICINE
Payer: COMMERCIAL

## 2024-03-18 VITALS
OXYGEN SATURATION: 99 % | HEIGHT: 61 IN | SYSTOLIC BLOOD PRESSURE: 172 MMHG | BODY MASS INDEX: 29.45 KG/M2 | WEIGHT: 156 LBS | RESPIRATION RATE: 18 BRPM | TEMPERATURE: 97.7 F | DIASTOLIC BLOOD PRESSURE: 82 MMHG | HEART RATE: 60 BPM

## 2024-03-18 DIAGNOSIS — R42 DIZZINESS: Primary | ICD-10-CM

## 2024-03-18 LAB
ALBUMIN UR-MCNC: NEGATIVE MG/DL
ANION GAP SERPL CALCULATED.3IONS-SCNC: 14 MMOL/L (ref 7–15)
APPEARANCE UR: CLEAR
BASOPHILS # BLD AUTO: 0.1 10E3/UL (ref 0–0.2)
BASOPHILS NFR BLD AUTO: 1 %
BILIRUB UR QL STRIP: NEGATIVE
BUN SERPL-MCNC: 9.6 MG/DL (ref 8–23)
CALCIUM SERPL-MCNC: 10.1 MG/DL (ref 8.8–10.2)
CHLORIDE SERPL-SCNC: 105 MMOL/L (ref 98–107)
COLOR UR AUTO: NORMAL
CREAT SERPL-MCNC: 0.86 MG/DL (ref 0.51–0.95)
DEPRECATED HCO3 PLAS-SCNC: 22 MMOL/L (ref 22–29)
EGFRCR SERPLBLD CKD-EPI 2021: 72 ML/MIN/1.73M2
EOSINOPHIL # BLD AUTO: 0 10E3/UL (ref 0–0.7)
EOSINOPHIL NFR BLD AUTO: 0 %
ERYTHROCYTE [DISTWIDTH] IN BLOOD BY AUTOMATED COUNT: 12.9 % (ref 10–15)
FLUAV RNA SPEC QL NAA+PROBE: NEGATIVE
FLUBV RNA RESP QL NAA+PROBE: NEGATIVE
GLUCOSE SERPL-MCNC: 114 MG/DL (ref 70–99)
GLUCOSE UR STRIP-MCNC: NEGATIVE MG/DL
HCT VFR BLD AUTO: 42.1 % (ref 35–47)
HGB BLD-MCNC: 14.1 G/DL (ref 11.7–15.7)
HGB UR QL STRIP: NEGATIVE
IMM GRANULOCYTES # BLD: 0.1 10E3/UL
IMM GRANULOCYTES NFR BLD: 1 %
KETONES UR STRIP-MCNC: NEGATIVE MG/DL
LEUKOCYTE ESTERASE UR QL STRIP: NEGATIVE
LYMPHOCYTES # BLD AUTO: 1.4 10E3/UL (ref 0.8–5.3)
LYMPHOCYTES NFR BLD AUTO: 15 %
MCH RBC QN AUTO: 29.8 PG (ref 26.5–33)
MCHC RBC AUTO-ENTMCNC: 33.5 G/DL (ref 31.5–36.5)
MCV RBC AUTO: 89 FL (ref 78–100)
MONOCYTES # BLD AUTO: 0.3 10E3/UL (ref 0–1.3)
MONOCYTES NFR BLD AUTO: 3 %
NEUTROPHILS # BLD AUTO: 7.7 10E3/UL (ref 1.6–8.3)
NEUTROPHILS NFR BLD AUTO: 80 %
NITRATE UR QL: NEGATIVE
NRBC # BLD AUTO: 0 10E3/UL
NRBC BLD AUTO-RTO: 0 /100
PH UR STRIP: 5 [PH] (ref 5–7)
PLATELET # BLD AUTO: 186 10E3/UL (ref 150–450)
POTASSIUM SERPL-SCNC: 4.1 MMOL/L (ref 3.4–5.3)
RBC # BLD AUTO: 4.73 10E6/UL (ref 3.8–5.2)
RBC URINE: <1 /HPF
RSV RNA SPEC NAA+PROBE: NEGATIVE
SARS-COV-2 RNA RESP QL NAA+PROBE: NEGATIVE
SODIUM SERPL-SCNC: 141 MMOL/L (ref 135–145)
SP GR UR STRIP: 1 (ref 1–1.03)
SQUAMOUS EPITHELIAL: <1 /HPF
TROPONIN T SERPL HS-MCNC: <6 NG/L
UROBILINOGEN UR STRIP-MCNC: NORMAL MG/DL
WBC # BLD AUTO: 9.4 10E3/UL (ref 4–11)
WBC URINE: <1 /HPF

## 2024-03-18 PROCEDURE — 84484 ASSAY OF TROPONIN QUANT: CPT | Performed by: BEHAVIOR TECHNICIAN

## 2024-03-18 PROCEDURE — 70450 CT HEAD/BRAIN W/O DYE: CPT | Mod: XU

## 2024-03-18 PROCEDURE — 70496 CT ANGIOGRAPHY HEAD: CPT

## 2024-03-18 PROCEDURE — 70553 MRI BRAIN STEM W/O & W/DYE: CPT

## 2024-03-18 PROCEDURE — 255N000002 HC RX 255 OP 636: Performed by: EMERGENCY MEDICINE

## 2024-03-18 PROCEDURE — 250N000011 HC RX IP 250 OP 636: Performed by: EMERGENCY MEDICINE

## 2024-03-18 PROCEDURE — 250N000013 HC RX MED GY IP 250 OP 250 PS 637: Performed by: EMERGENCY MEDICINE

## 2024-03-18 PROCEDURE — 93005 ELECTROCARDIOGRAM TRACING: CPT

## 2024-03-18 PROCEDURE — 36415 COLL VENOUS BLD VENIPUNCTURE: CPT | Performed by: BEHAVIOR TECHNICIAN

## 2024-03-18 PROCEDURE — 81003 URINALYSIS AUTO W/O SCOPE: CPT | Performed by: BEHAVIOR TECHNICIAN

## 2024-03-18 PROCEDURE — 99285 EMERGENCY DEPT VISIT HI MDM: CPT | Mod: 25

## 2024-03-18 PROCEDURE — 87637 SARSCOV2&INF A&B&RSV AMP PRB: CPT | Performed by: EMERGENCY MEDICINE

## 2024-03-18 PROCEDURE — 250N000009 HC RX 250: Performed by: EMERGENCY MEDICINE

## 2024-03-18 PROCEDURE — 85025 COMPLETE CBC W/AUTO DIFF WBC: CPT | Performed by: BEHAVIOR TECHNICIAN

## 2024-03-18 PROCEDURE — 96374 THER/PROPH/DIAG INJ IV PUSH: CPT | Mod: 59

## 2024-03-18 PROCEDURE — 80048 BASIC METABOLIC PNL TOTAL CA: CPT | Performed by: BEHAVIOR TECHNICIAN

## 2024-03-18 PROCEDURE — A9585 GADOBUTROL INJECTION: HCPCS | Performed by: EMERGENCY MEDICINE

## 2024-03-18 RX ORDER — ONDANSETRON 4 MG/1
4 TABLET, ORALLY DISINTEGRATING ORAL ONCE
Status: COMPLETED | OUTPATIENT
Start: 2024-03-18 | End: 2024-03-18

## 2024-03-18 RX ORDER — IOPAMIDOL 755 MG/ML
67 INJECTION, SOLUTION INTRAVASCULAR ONCE
Status: COMPLETED | OUTPATIENT
Start: 2024-03-18 | End: 2024-03-18

## 2024-03-18 RX ORDER — MECLIZINE HYDROCHLORIDE 25 MG/1
25 TABLET ORAL 3 TIMES DAILY PRN
Qty: 21 TABLET | Refills: 0 | Status: SHIPPED | OUTPATIENT
Start: 2024-03-18 | End: 2024-03-25

## 2024-03-18 RX ORDER — GADOBUTROL 604.72 MG/ML
7 INJECTION INTRAVENOUS ONCE
Status: COMPLETED | OUTPATIENT
Start: 2024-03-18 | End: 2024-03-18

## 2024-03-18 RX ORDER — MECLIZINE HYDROCHLORIDE 25 MG/1
25 TABLET ORAL ONCE
Status: COMPLETED | OUTPATIENT
Start: 2024-03-18 | End: 2024-03-18

## 2024-03-18 RX ORDER — ONDANSETRON 2 MG/ML
4 INJECTION INTRAMUSCULAR; INTRAVENOUS ONCE
Status: COMPLETED | OUTPATIENT
Start: 2024-03-18 | End: 2024-03-18

## 2024-03-18 RX ADMIN — ONDANSETRON 4 MG: 2 INJECTION INTRAMUSCULAR; INTRAVENOUS at 14:50

## 2024-03-18 RX ADMIN — IOPAMIDOL 67 ML: 755 INJECTION, SOLUTION INTRAVENOUS at 14:24

## 2024-03-18 RX ADMIN — GADOBUTROL 7 ML: 604.72 INJECTION INTRAVENOUS at 15:26

## 2024-03-18 RX ADMIN — MECLIZINE HYDROCHLORIDE 25 MG: 25 TABLET ORAL at 16:27

## 2024-03-18 RX ADMIN — SODIUM CHLORIDE 100 ML: 9 INJECTION, SOLUTION INTRAVENOUS at 14:24

## 2024-03-18 RX ADMIN — ONDANSETRON 4 MG: 4 TABLET, ORALLY DISINTEGRATING ORAL at 16:24

## 2024-03-18 ASSESSMENT — ACTIVITIES OF DAILY LIVING (ADL)
ADLS_ACUITY_SCORE: 35

## 2024-03-18 NOTE — ED NOTES
Patient dressed in clothes and writer helped with shoes, patient was being wheeled out to the lobby and started to throw up, RN notified.

## 2024-03-18 NOTE — ED PROVIDER NOTES
History     Chief Complaint:  Dizziness       HPI   Angela Story is a 73 year old female with history of hypothyroidism, CKD stage III, hypertension who presents to the ED for dizziness.  Patient states that she has had episodic dizziness that started 3 weeks ago.  She describes this as a room spinning dizziness. She notes that the episodes usually last for day and then resolve. She was not seen for this in the past.  This morning she woke up with room spinning dizziness.  She states that it gets worse with certain positional changes.  She denies any fever, cough, shortness of breath, nausea, vomiting, abdominal pain, diarrhea.  She denies any new medication changes.  She denies any syncope or presyncopal episodes.  No recent falls or injuries. She denies ringing in her ears or hearing loss. No history of stroke.       Independent Historian:    Patient only.     Review of External Notes:  None      Medications:    meclizine (ANTIVERT) 25 MG tablet  acetaminophen (TYLENOL) 500 MG tablet  albuterol (PROAIR HFA/PROVENTIL HFA/VENTOLIN HFA) 108 (90 Base) MCG/ACT inhaler  amLODIPine (NORVASC) 2.5 MG tablet  ASPIRIN LOW DOSE 81 MG EC tablet  Blood Pressure Monitoring KIT  cholecalciferol 25 MCG (1000 UT) TABS  citalopram (CELEXA) 20 MG tablet  clindamycin (CLEOCIN) 300 MG capsule  fluconazole (DIFLUCAN) 150 MG tablet  hydrOXYzine HCl (ATARAX) 25 MG tablet  levothyroxine (SYNTHROID/LEVOTHROID) 25 MCG tablet  losartan (COZAAR) 50 MG tablet  NYSTOP [id number] UNIT/GM powder  omeprazole (PRILOSEC) 20 MG DR capsule  pravastatin (PRAVACHOL) 20 MG tablet  scopolamine (TRANSDERM) 1 MG/3DAYS 72 hr patch  Selenium 200 MCG TABS tablet  vitamin D3 (CHOLECALCIFEROL) 50 mcg (2000 units) tablet        Past Medical History:    Past Medical History:   Diagnosis Date    Bilateral bunions 05/12/2014    CKD (chronic kidney disease) stage 3, GFR 30-59 ml/min (H)     Gastro-oesophageal reflux disease     Hyperlipidemia LDL goal <100  2010    Hypertension goal BP (blood pressure) < 140/90 2013    IFG (impaired fasting glucose) 2016    Leiomyoma of large intestine     Osteoarthritis of acromioclavicular joint     PONV (postoperative nausea and vomiting)     Thyroid nodule 2015       Past Surgical History:    Past Surgical History:   Procedure Laterality Date    BIOPSY      Breast lump    BREAST SURGERY      See above    BUNIONECTOMY  2014    Procedure: BUNIONECTOMY;  Surgeon: Kevin Rodas DPM;  Location: [facility] OR    C/SECTION, LOW TRANSVERSE      COLONOSCOPY  ; 2018    COLONOSCOPY N/A 2023    Procedure: Colonoscopy;  Surgeon: Sierra Owens MD;  Location:  GI    COMBINED REPAIR PTOSIS WITH BLEPHAROPLASTY Bilateral 2021    Procedure: Bilateral upper eyelid blepharoplasty wtih ptosis repair;  Surgeon: Gregor Harris MD;  Location: [facility] OR    DILATION AND CURETTAGE, OPERATIVE HYSTEROSCOPY WITH MORCELLATOR, COMBINED N/A 2023    Procedure: HYSTEROSCOPY, WITH DILATION AND CURETTAGE OF UTERUS USING MYOSURE MORCELLATOR;  Surgeon: Puja Mills DO;  Location: [facility] OR    EXCISE MASS BACK  2014    Procedure: EXCISE MASS BACK;  Surgeon: Chaim Lacy MD;  Location: [facility] OR    EYE SURGERY      ?    HC DILATION/CURETTAGE DIAG/THER NON OB  1982    D & C    REPLACEMENT TOTAL KNEE      surgery for ptosis of both eyelids      TONSILLECTOMY  1966    tonsillectomy    ZZC  DELIVERY ONLY      , Low Cervical    ZZC NONSPECIFIC PROCEDURE  1986    lumpectomy, lt    ZZC NONSPECIFIC PROCEDURE      ob     ZZC NONSPECIFIC PROCEDURE  1983    Vaginal delivery x 1    ZZHC ARTHROTOMY W/OPEN MENISCUS REPAIR  2007    lt knee          Physical Exam   Patient Vitals for the past 24 hrs:   BP Temp Temp src Pulse Resp SpO2 Height Weight   24 1550 (!) 172/82 -- -- -- -- 99 % -- --   24 1400 137/65 -- -- 60 -- 98 % -- --   24 1330 137/61 -- -- 57 -- 99 % -- --  "  03/18/24 1313 (!) 151/69 -- -- -- -- 99 % -- --   03/18/24 1017 (!) 174/77 97.7  F (36.5  C) Oral 69 18 100 % 1.549 m (5' 1\") 70.8 kg (156 lb)        Physical Exam  Physical Exam:  General: Well appearing, no acute distress  Head: normocephalic, atraumatic  Eyes: PERRLA, EOMI, no nystagmus   Ears: bilateral TMs appear normal, no erythema, bulging or drainage of ear canals.   Nose: no signs of bleeding, or drainage  Throat: moist mucous membranes, no erythema, exudate, or drainage of the posterior oropharynx.   CV: RRR, no murmur/gallop/rubs  Pulm: lungs clear to ausculation bilaterally, normal respiratory effort, normal chest expansion with breathing   Abdomen: soft, non-tender, non-distended, no rigidity or guarding, normal BS  MSK: No cervical tenderness, no midline tenderness  Ext: normal range of motion of all extremities. No tenderness to palpation, erythema, or deformity.   Skin: Warm, dry, no rashes  Neuro: A&O x3, normal speech. No focal neurologic deficits. Muscle strength 5/5 bilaterally. Sensation is intact  Psych: Appropriate mood. Cooperative      Emergency Department Course   ECG  Normal sinus rhythm. Rate of 66. Normal SC and QRS. Normal QTc. No acute ST elevation or depression as compared with 6/13/2023 EKG.     Interpreted by Dr. Eric MD.        Imaging:  MR Brain w/o & w Contrast   Final Result   IMPRESSION:  No acute intracranial abnormality.         DEBRA LYNN MD            SYSTEM ID:  [id number]      CTA Head Neck with Contrast   Final Result   IMPRESSION:   1. No large vessel occlusion throughout the [place] arteries.   2. No significant stenosis throughout the major neck arteries.         DEBRA LYNN MD            SYSTEM ID:  [id number]      CT Head w/o Contrast   Final Result   IMPRESSION:   No evidence of acute intracranial hemorrhage, mass, or   herniation.         DEBRA LYNN MD            SYSTEM ID:  [id number]          Laboratory:  Labs Ordered and Resulted from Time " of ED Arrival to Time of ED Departure   BASIC METABOLIC PANEL - Abnormal       Result Value    Sodium 141      Potassium 4.1      Chloride 105      Carbon Dioxide (CO2) 22      Anion Gap 14      Urea Nitrogen 9.6      Creatinine 0.86      GFR Estimate 72      Calcium 10.1      Glucose 114 (*)    TROPONIN T, HIGH SENSITIVITY - Normal    Troponin T, High Sensitivity <6     ROUTINE UA WITH MICROSCOPIC REFLEX TO CULTURE - Normal    Color Urine Straw      Appearance Urine Clear      Glucose Urine Negative      Bilirubin Urine Negative      Ketones Urine Negative      Specific Gravity Urine 1.004      Blood Urine Negative      pH Urine 5.0      Protein Albumin Urine Negative      Urobilinogen Urine Normal      Nitrite Urine Negative      Leukocyte Esterase Urine Negative      RBC Urine <1      WBC Urine <1      Squamous Epithelials Urine <1     INFLUENZA A/B, RSV, & SARS-COV2 PCR - Normal    Influenza A PCR Negative      Influenza B PCR Negative      RSV PCR Negative      SARS CoV2 PCR Negative     CBC WITH PLATELETS AND DIFFERENTIAL    WBC Count 9.4      RBC Count 4.73      Hemoglobin 14.1      Hematocrit 42.1      MCV 89      MCH 29.8      MCHC 33.5      RDW 12.9      Platelet Count 186      % Neutrophils 80      % Lymphocytes 15      % Monocytes 3      % Eosinophils 0      % Basophils 1      % Immature Granulocytes 1      NRBCs per 100 WBC 0      Absolute Neutrophils 7.7      Absolute Lymphocytes 1.4      Absolute Monocytes 0.3      Absolute Eosinophils 0.0      Absolute Basophils 0.1      Absolute Immature Granulocytes 0.1      Absolute NRBCs 0.0          Procedures   None    Emergency Department Course & Assessments:    Interventions:  Medications   iopamidol (ISOVUE-370) solution 67 mL (67 mLs Intravenous $Given 3/18/24 142)   sodium chloride 0.9 % bag 500mL for CT scan flush use (100 mLs Intravenous $Given 3/18/24 1424)   ondansetron (ZOFRAN) injection 4 mg (4 mg Intravenous $Given 3/18/24 1450)   gadobutrol  (GADAVIST) injection 7 mL (7 mLs Intravenous $Given 3/18/24 1526)   sodium chloride (PF) 0.9% PF flush 10 mL (10 mLs Intravenous $Given 3/18/24 1526)   ondansetron (ZOFRAN ODT) ODT tab 4 mg (4 mg Oral $Given 3/18/24 1624)   meclizine (ANTIVERT) tablet 25 mg (25 mg Oral $Given 3/18/24 1627)        Assessments:  See ED course.    Independent Interpretation (X-rays, CTs, rhythm strip):  None    Consultations/Discussion of Management or Tests:  ED Course as of 03/18/24 1653   Mon Mar 18, 2024   1206 Evaluated patient and obtained history.    1300 EKG 12 lead  Normal sinus rhythm.  Rate of 66.  Normal PA and QRS.  Normal QTc.  No acute ST elevation or depression as compared with 6/13/2023 EKG.   1443 Patient endorsing nausea and MRI.  Zofran ordered.   1539 Updated patient on lab and image findings.  No evidence of CVA.  Symptoms most consistent with BPPV especially given rapid head movement does exacerbate her dizziness.  At this time, patient sitting in bed in no acute distress.  She has ambulated independently without difficulty.  Dizziness have improved.  Patient did have some nausea earlier during transportation from bed to bed after CT scan, but this has improved after Zofran.  Patient inquired about flying to [place] in 2 days.  Given patient is still having motion sensitivity still and baseline already has history of significant motion sickness from flights.  I advised the patient that the flight may potentially exacerbate her symptoms especially with the altitude change in addition to potential turbulence.  Patient voiced agreement and will likely postpone her trip.  Patient will follow-up in dizzy clinic outpatient.  Will send prescription for meclizine in addition to dizzy clinic referral information on discharge paperwork.  Discussed return precautions.  Answered all questions.  Patient voiced understanding and agreement with plan.       Social Determinants of Health affecting care:  None     Disposition:  The  patient was discharged.    Impression & Plan        Medical Decision Making:  Patient presents as stated above.  Vitals are stable in the ED.  Broad differential was considered including posterior stroke, BPPV, Ménière's disease, vertebral dissection, UTI, viral illness.  Patient endorses having 3 weeks of intermittent positional dizziness.  She describes it as a room spinning dizziness.  She denies other symptoms of cough, shortness of breath, chest pain, weakness, confusion.  Labs are reassuring.  Viral panel was negative.  UA was normal.  Troponin was negative.  EKG showed sinus rhythm.  Do not suspect ACS.  Obtained imaging given ongoing dizziness.  CT head was negative for hemorrhage, mass, infarction.  CTA head and neck showed no evidence of large vessel occlusion or stenosis of neck arteries.  MRI of brain with and without contrast showed no acute intracranial abnormality.  Suspect episodic vertigo is due to BPPV.  Discussed negative workup with patient.  She was given meclizine and Zofran in the ED with improvement of symptoms.  She was given referral for dizzy and balance clinic for further evaluation.  She was given prescription for meclizine.  Discussed follow-up with primary care for reassessment.  Patient understands plan of care and is okay to discharge.        Diagnosis:    ICD-10-CM    1. Dizziness  R42            Discharge Medications:  New Prescriptions    MECLIZINE (ANTIVERT) 25 MG TABLET    Take 1 tablet (25 mg) by mouth 3 times daily as needed for dizziness          Chava Sandoval PA-C  3/18/2024              Chava Sandoval PA-C  03/18/24 [id number]

## 2024-03-18 NOTE — TELEPHONE ENCOUNTER
Nursing team: left message if/when patient returns call. Left voicemail to discuss patient should be seen in urgent care.    Patient calling in and reports has had some intermittent dizziness throughout the last couple weeks. Usually is not lasting, and resolves within a minute or two. Last night around 10 pm, patient reported a dizzy/tilting/spinning type sensation. Patient went to bed and symptoms did not resolve. Upon waking up, patient continues to feel spinning/dizzy/tilting type sensation. Changing head position makes symptoms worse.Patient reports feeling slightly nauseous at times, and has looser stool with dizzy episodes. Patient denies feeling lightheaded, difficulty breathing, or chest pain.     While talking with patient, requested patient to take blood pressure and pulse. Patient reports blood pressure 167/70, and heart rate 65 bpm.    Disposition: GO TO ED/UCC NOW (OR TO OFFICE WITH PCP APPROVAL): After speaking with provider from [facility], provider advises urgent care.     Reason for Disposition   Spinning or tilting sensation (vertigo) present now and one or more stroke risk factors (i.e., hypertension, diabetes mellitus, prior stroke/TIA, heart attack, age over 60) (Exception: Prior physician evaluation for this AND no different/worse than usual.)    Additional Information   Negative: Fainted, and still feels dizzy afterwards   Negative: Overdose (accidental or intentional) of medications   Negative: SEVERE difficulty breathing (e.g., struggling for each breath, speaks in single words)   Negative: Shock suspected (e.g., cold/pale/clammy skin, too weak to stand, low BP, rapid pulse)   Negative: Difficult to awaken or acting confused (e.g., disoriented, slurred speech)   Negative: New neurologic deficit that is present now: * Weakness of the face, arm, or leg on one side of the body * Numbness of the face, arm, or leg on one side of the body * Loss of speech or garbled speech   Negative: Heart beating < 50  "beats per minute OR > 140 beats per minute   Negative: Sounds like a life-threatening emergency to the triager   Negative: Chest pain   Negative: Rectal bleeding, bloody stool, or tarry-black stool   Negative: Vomiting is main symptom   Negative: Diarrhea is main symptom   Negative: Headache is main symptom   Negative: Heat exhaustion suspected (i.e., dehydration from heat exposure)   Negative: Patient states that they are having an anxiety or panic attack   Negative: Dizziness from low blood sugar (i.e., < 60 mg/dl or 3.5 mmol/l)   Negative: SEVERE dizziness (e.g., unable to stand, requires support to walk, feels like passing out now)   Negative: SEVERE headache or neck pain    Answer Assessment - Initial Assessment Questions  1. DESCRIPTION: \"Describe your dizziness.\"      Dizziness is intermittent, and feels more tilting spinning type sensation  2. LIGHTHEADED: \"Do you feel lightheaded?\" (e.g., somewhat faint, woozy, weak upon standing)      No  3. VERTIGO: \"Do you feel like either you or the room is spinning or tilting?\" (i.e. vertigo)      Spinning  4. SEVERITY: \"How bad is it?\"  \"Do you feel like you are going to faint?\" \"Can you stand and walk?\"    - MILD: Feels slightly dizzy, but walking normally.    - MODERATE: Feels unsteady when walking, but not falling; interferes with normal activities (e.g., school, work).    - SEVERE: Unable to walk without falling, or requires assistance to walk without falling; feels like passing out now.       Moderate, feeling unsteady but able to walk  5. ONSET:  \"When did the dizziness begin?\"      Last night around 10 pm  6. AGGRAVATING FACTORS: \"Does anything make it worse?\" (e.g., standing, change in head position)      Changing in head position  7. HEART RATE: \"Can you tell me your heart rate?\" \"How many beats in 15 seconds?\"  (Note: not all patients can do this)        167/70, heart rate is 65 bpm.  8. CAUSE: \"What do you think is causing the dizziness?\"      Patient " "initially thought it could be related to blood pressure. Patient has taken blood pressure at home, but normal.  9. RECURRENT SYMPTOM: \"Have you had dizziness before?\" If Yes, ask: \"When was the last time?\" \"What happened that time?\"      No  10. OTHER SYMPTOMS: \"Do you have any other symptoms?\" (e.g., fever, chest pain, vomiting, diarrhea, bleeding)        Loose bowels, dry heaves last week with intermittent dizziness.  11. PREGNANCY: \"Is there any chance you are pregnant?\" \"When was your last menstrual period?\"        NA    Protocols used: Dizziness-A-OH      Referral to Acute and Diagnostic Services    [phone] ([place]) [facility] [address] [facility], [address], [place] [address]    Transition to Acute & Diagnostic Services Clinic has been discussed with patient, and she agrees with next level of care.   Patient understands that evaluation/treatment at [facility] typically takes significantly longer than in clinic/urgent care (>2 hours).  The [facility] has been contacted by provider/staff to confirm patient acceptance.     Lauren Sims RN                                  "

## 2024-03-18 NOTE — ED NOTES
Patient feeling much better after medications, has slight dizziness with some movement. Patient discharged home with .

## 2024-03-18 NOTE — ED NOTES
18-Aug-2023 08:02 Patient feeling an increase in nausea and dizziness with movement, given medications, waiting for  to come and pick her up.

## 2024-03-18 NOTE — TELEPHONE ENCOUNTER
Pt returned triage call. She was advised to see UC or schedule next available appt with a provider. Pt opts to go to ER now. She will have her  take her to ER.

## 2024-03-18 NOTE — ED PROVIDER NOTES
ED ATTENDING PHYSICIAN NOTE:   I evaluated this patient in conjunction with Chava Sandoval PA-C  I have participated in the care of the patient and personally performed key elements of the history, exam, and medical decision making.      HPI:   Angela Story is a 73 year old female presents to the emergency department for dizziness.  Patient reports that 1 week ago, she had some dizziness that was room spinning and associated nausea, vomiting, and diarrhea.  Patient states that that episode lasted roughly 1 day and improved and throughout the week, she has some lingering dizziness, but not as bad as when it first started.  However, last night, as patient was laying down to go to bed roughly around 10 PM, she had recurrence of her dizziness and describes this as room spinning as windows would be shifting when she looks at it.  When she woke up this morning, room spinning dizziness became worse.  Patient reports needing to hold onto things in order to walk, which brought her to the ER for further evaluation.  Patient however states that after she arrived to the ER, her symptoms have significantly improved.  No similar prior history.  No headache component.  No numbness component.  No weakness.  No ear pain.  No chest pain or shortness of breath.    Independent Historian:   None - Patient Only    Review of External Notes:   Nursing telephone triage note from today.     EXAM:   Constitutional:       General: Not in acute distress.     Appearance: Normal appearance  HENT:      Head: Normocephalic and atraumatic.   Eyes:     Extraocular Movements: Extraocular movements intact.      Conjunctiva/sclera: Conjunctivae normal.      Pupils: Pupils are equal, round, and reactive to light.   Cardiovascular:     Rate and Rhythm: Normal rate and regular rhythm.   Pulmonary:      Effort: Pulmonary effort is normal.      Breath sounds: Normal breath sounds.   Abdominal:      General: Abdomen is flat. There is no distension.       Palpations: Abdomen is soft.      Tenderness: There is no abdominal tenderness.   Musculoskeletal:      Cervical back: Normal range of motion and neck supple. No rigidity.      General: No swelling or deformity.   Skin:     General: Skin is warm and dry.   Neurological:      General: No focal deficit present.     NIHSS: Patient alert and keenly responsive. Able to answer month and age. Able and blink eyes and squeeze hands. No gaze palsy. No visual field deficits. No facial palsy. No arm drift bilaterally. No leg drift bilaterally. No limb ataxia. Able to complete finger nose finger and heel to shin. No sensory deficits. No language deficits/aphasia. No dysarthria. No extinction/inattention.     NIHSS score of 0.       Mental Status: Alert and oriented to person, place, and time.   Psychiatric:         Mood and Affect: Mood normal.         Behavior: Behavior normal.     Independent Interpretation (X-rays, CTs, rhythm strip):  None    Consultations/Discussion of Management or Tests:  ED Course as of 03/18/24 1606   Mon Mar 18, 2024   1300 EKG 12 lead  Normal sinus rhythm.  Rate of 66.  Normal CA and QRS.  Normal QTc.  No acute ST elevation or depression as compared with 6/13/2023 EKG.   1443 Patient endorsing nausea and MRI.  Zofran ordered.   1539 Updated patient on lab and image findings.  No evidence of CVA.  Symptoms most consistent with BPPV especially given rapid head movement does exacerbate her dizziness.  At this time, patient sitting in bed in no acute distress.  She has ambulated independently without difficulty.  Dizziness have improved.  Patient did have some nausea earlier during transportation from bed to bed after CT scan, but this has improved after Zofran.  Patient inquired about flying to [employer] in 2 days.  Given patient is still having motion sensitivity still and baseline already has history of significant motion sickness from flights.  I advised the patient that the flight may potentially  exacerbate her symptoms especially with the altitude change in addition to potential turbulence.  Patient voiced agreement and will likely postpone her trip.  Patient will follow-up in dizzy clinic outpatient.  Will send prescription for meclizine in addition to dizzy clinic referral information on discharge paperwork.  Discussed return precautions.  Answered all questions.  Patient voiced understanding and agreement with plan.     Social Determinants of Health affecting care:   None     MEDICAL DECISION MAKING/ASSESSMENT AND PLAN:   73-year-old female as described above presents to the emergency department for room spinning dizziness that is since resolved upon arrival to the ER.  Second occurrence this week.  Patient hemodynamically stable at time of evaluation.  Afebrile.  On my examination, patient is nonfocal.  NIHSS score of 0.  Dizziness seem to be triggered last night after laying down flat in a certain position.  Suspect BPPV.  Nonetheless, additional differential diagnosis considered includes, but not limited to, CVA/TIA/vestibular neuritis/labyrinthitis.  Will obtain CT head, CTA head and neck, and MRI brain with and without contrast for evaluation for above discussed differentials.  Unlikely TIA.  Send symptoms have been ongoing for a week, suspect if CVA, we will be able to observe on MRI imaging or CT imaging.  If workup negative and patient is symptom-free, likely discharge to outpatient follow-up and dizzy clinic.  Supportive care.  Discussed care plan with patient who voiced understanding and agreement with plan.  Answered all questions.  Additional workup and orders as listed in chart.    Please refer to ED course above as part of continuation of MDM for details on the patient's treatment course and any changes or updates in care plan beyond my initial evaluation and MDM creation.     DIAGNOSIS:     ICD-10-CM    1. Dizziness  R42            DISPOSITION:   Discharge     TATIANA AVILA DO  3/18/2024  M  [facility] EMERGENCY DEPT     Shashank Reyes DO  03/18/24 [id number]

## 2024-03-18 NOTE — ED TRIAGE NOTES
Last night dizziness when laying, happened 3 weeks ago, but had GI sx at that time, since then has had a couple episodes lasting short time, BP has been normal, today dizzy, dry heaves, had a bm, doesn't feel flu-keily, bp was 166/72 at home HR 65 , 30 pound weight loss

## 2024-03-19 ENCOUNTER — TELEPHONE (OUTPATIENT)
Dept: FAMILY MEDICINE | Facility: CLINIC | Age: 74
End: 2024-03-19

## 2024-03-19 ENCOUNTER — OFFICE VISIT (OUTPATIENT)
Dept: FAMILY MEDICINE | Facility: CLINIC | Age: 74
End: 2024-03-19
Payer: COMMERCIAL

## 2024-03-19 VITALS
DIASTOLIC BLOOD PRESSURE: 68 MMHG | BODY MASS INDEX: 29.64 KG/M2 | HEIGHT: 61 IN | WEIGHT: 157 LBS | OXYGEN SATURATION: 99 % | RESPIRATION RATE: 16 BRPM | TEMPERATURE: 98.2 F | SYSTOLIC BLOOD PRESSURE: 112 MMHG

## 2024-03-19 DIAGNOSIS — R42 VERTIGO: Primary | ICD-10-CM

## 2024-03-19 DIAGNOSIS — H81.10 BENIGN PAROXYSMAL POSITIONAL VERTIGO, UNSPECIFIED LATERALITY: ICD-10-CM

## 2024-03-19 LAB
ATRIAL RATE - MUSE: 66 BPM
DIASTOLIC BLOOD PRESSURE - MUSE: NORMAL MMHG
INTERPRETATION ECG - MUSE: NORMAL
P AXIS - MUSE: 61 DEGREES
PR INTERVAL - MUSE: 156 MS
QRS DURATION - MUSE: 74 MS
QT - MUSE: 412 MS
QTC - MUSE: 431 MS
R AXIS - MUSE: 49 DEGREES
SYSTOLIC BLOOD PRESSURE - MUSE: NORMAL MMHG
T AXIS - MUSE: 30 DEGREES
VENTRICULAR RATE- MUSE: 66 BPM

## 2024-03-19 PROCEDURE — 99213 OFFICE O/P EST LOW 20 MIN: CPT | Performed by: PHYSICIAN ASSISTANT

## 2024-03-19 NOTE — PATIENT INSTRUCTIONS
Decrease amlodipine from 2.5mg twice daily to just one at bedtime, check blood pressure 3-4 times per week and record, bring readings to next visit in 3 weeks     Vestibular therapy    Continue meclizine

## 2024-03-19 NOTE — TELEPHONE ENCOUNTER
Pt called following up on dizziness ER visit from yesterday     States ER did multiple tests and determined was probably inner ear related but was advised to follow up with PCP for this/HTN     137/63 HR 70 last night     134/69 HR 65 this AM     This was the 2nd bout of dizziness, had vomiting and dizziness/diarrhea 2 weeks prior to that, thought it was related to the flu     Feels much better today than yesterday, but somewhat dizzy and unbalanced     ER provider gave her Meclizine & phone number for dizzy and balance center     Pt was to follow up with PCP office for dizziness. Appt scheduled with team today:     Appointments in Next Year      Mar 19, 2024  2:30 PM  (Arrive by 2:10 PM)  Provider Visit with Carmen Pan PA-C  [facility] ([facility] ) [phone]     Oct 18, 2024  9:30 AM  (Arrive by 9:10 AM)  MEDICARE ANNUAL WELLNESS VISIT with Merary Geller MD  [facility] ([facility] ) [phone]            Alex Cintron RN  [facility]

## 2024-03-19 NOTE — LETTER
April 17, 2024      Angela Story  [address]   [place] [id number]        To Whom It May Concern:    Angela Story  was seen on 3/19/24 for severe vertigo.  At that time, it was advised that she not travel due to worsening of symptoms (dizziness, vomiting) with any movement.  Please excuse the patient and her  (Shane Story) from travel on 3/20/24.  The patient's  stayed home with her during that time due to her unsteadiness and increased fall risk.  If you have any questions, please feel free to reach out to me at above #.       Sincerely,        Cramen Pan PA-C

## 2024-03-19 NOTE — PROGRESS NOTES
Assessment and Plan:     (R42) Vertigo  (primary encounter diagnosis)  Comment: intermittent x 3 weeks, worsens with position change, neuroimaging negative in ED yesterday and neuro exam normal today, has meclizine which is helping   Plan: Physical Therapy  Referral        Will have her reduce amlodipine as BP has been running a bit low at home, take 2.5mg once daily instead of twice daily  Follow-up with me in 3 weeks for recheck      Carmen Pan PA-C      Choco Miller is a 73 year old, presenting for the following health issues:  No chief complaint on file.    BERTRAM Miller is here for ED follow-up  The following is the MDM from her ED visit on 3/18/24:    Patient presents as stated above.  Vitals are stable in the ED.  Broad differential was considered including posterior stroke, BPPV, Ménière's disease, vertebral dissection, UTI, viral illness.  Patient endorses having 3 weeks of intermittent positional dizziness.  She describes it as a room spinning dizziness.  She denies other symptoms of cough, shortness of breath, chest pain, weakness, confusion.  Labs are reassuring.  Viral panel was negative.  UA was normal.  Troponin was negative.  EKG showed sinus rhythm.  Do not suspect ACS.  Obtained imaging given ongoing dizziness.  CT head was negative for hemorrhage, mass, infarction.  CTA head and neck showed no evidence of large vessel occlusion or stenosis of neck arteries.  MRI of brain with and without contrast showed no acute intracranial abnormality.  Suspect episodic vertigo is due to BPPV.  Discussed negative workup with patient.  She was given meclizine and Zofran in the ED with improvement of symptoms.  She was given referral for dizzy and balance clinic for further evaluation.  She was given prescription for meclizine.  Discussed follow-up with primary care for reassessment.  Patient understands plan of care and is okay to discharge.     She notes her symptoms started three  "weeks ago with dizziness which she describes as room spinning accompanied by nausea/vomiting and diarrrhea  Symptoms resolved after a day   She has since been having brief episodes of vertigo with position change that last seconds for the last three weeks  She notices symptoms most when going from lying flat to sitting up  Symptoms worsened on Sunday night two days ago which prompted above ED visit   She denies any associated chest pain, focal weakness, sob, palpitations  She has been watching bp at home and is running 89//75  She has had about a 40lb intentional weight loss over ht elsat         Objective    LMP 07/23/2004   There is no height or weight on file to calculate BMI.    /68   Temp 98.2  F (36.8  C) (Oral)   Resp 16   Ht 1.549 m (5' 1\")   Wt 71.2 kg (157 lb)   LMP 07/23/2004   SpO2 99%   Breastfeeding No   BMI 29.66 kg/m      Physical Exam     EXAM:  GENERAL APPEARANCE: healthy, alert and no distress  EYES: Eyes grossly normal to inspection  HENT: ear canals and TM's normaal, mouth without ulcers or lesions  RESP: lungs clear to auscultation - no rales, rhonchi or wheezes  CV: regular rates and rhythm, normal S1 S2, no S3 or S4 and no murmur, click or rub  EXT: no edema bilat lower ext  NEUROLOGICAL EXAM:  Normal speech, tongue is midline, KARLEE, EOMI   strength equal/intact  Elbow flexion and extension is equal/intact  Negative rapid alternating movements  Gait is steady, no ataxia          Signed Electronically by: Carmen Pan PA-C    "

## 2024-03-26 ENCOUNTER — THERAPY VISIT (OUTPATIENT)
Dept: PHYSICAL THERAPY | Facility: CLINIC | Age: 74
End: 2024-03-26
Attending: PHYSICIAN ASSISTANT
Payer: COMMERCIAL

## 2024-03-26 DIAGNOSIS — R42 VERTIGO: ICD-10-CM

## 2024-03-26 PROCEDURE — 97161 PT EVAL LOW COMPLEX 20 MIN: CPT | Mod: GP | Performed by: PHYSICAL THERAPIST

## 2024-03-26 PROCEDURE — 95992 CANALITH REPOSITIONING PROC: CPT | Mod: GP | Performed by: PHYSICAL THERAPIST

## 2024-03-26 NOTE — PROGRESS NOTES
03/26/24 0800   Signing Clinician's Name / Credentials   Signing clinician's name / credentials Maria CHAVEZ   Functional Gait Assessment (MANNIE Clements., Elli GMay F., et al. (2004))   1. GAIT LEVEL SURFACE 2   2. CHANGE IN GAIT SPEED 3   3. GAIT WITH HORIZONTAL HEAD TURNS 2   4. GAIT WITH VERTICAL HEAD TURNS 1   5. GAIT AND PIVOT TURN 3   6. STEP OVER OBSTACLE 3   7. GAIT WITH NARROW BASE OF SUPPORT 3   8. GAIT WITH EYES CLOSED 1   9. AMBULATING BACKWARDS 2   10. STEPS 3   Total Functional Gait Assessment Score   TOTAL SCORE: (MAXIMUM SCORE 30) 23

## 2024-03-26 NOTE — PROGRESS NOTES
PHYSICAL THERAPY EVALUATION  Type of Visit: Evaluation  See electronic medical record for Abuse and Falls Screening details.    Subjective   73 y.o.with intermittant vertigo with position changes.  Admitted to ED on 3/18/24.  Head CT and MRI were negative.  Cardiac work up negative.  Suspect BPPV.  Reports vertigo is better as the day goes on.   Occurs with laying down and lasts for seconds.  Vertigo is present when she rolls on either side, but a little worse with rolling to the right.  Reports balance is a bit off too.   Bending forward to  shoes can trigger it.  Reports trying to avoid movements that trigger it and moving en bloc.  Followed up with MD revealed BP running low to they are trying to reduce BP meds.       Presenting condition or subjective complaint: Dizziness  Date of onset: 03/18/24    Relevant medical history: Dizziness; High blood pressure; Kidney disease; Overweight; Severe dizziness; Thyroid problems   Dates & types of surgery: 12/2022 & 5/2023 knee replacements    Prior diagnostic imaging/testing results: MRI; CT scan; Other EKG   Prior therapy history for the same diagnosis, illness or injury: No      Prior Level of Function  Transfers: Independent  Ambulation: Independent  ADL: Independent  IADL:     Living Environment  Social support: With a significant other or spouse   Type of home: Apartment/condo   Stairs to enter the home: No       Ramp: No   Stairs inside the home: No       Help at home: Home and Yard maintenance tasks  Equipment owned: Straight Cane; Walker     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 Impaired balance noted. UEs in low guard position, decreased step length and speed.     BALANCE:     SPECIAL TESTS  Functional Gait Assessment (FGA) TOTAL SCORE: (MAXIMUM SCORE 30): 23  (<22/30 indicates fall risk)     Dynamic Gait Index (DGI)    (<19/24 indicates fall risk)   Modified CTSIB Conditions (sec) Cond 1:   Cond 2: 30  Cond 4: 30  Cond 5 : 30           VESTIBULAR  Cervicogenic Screen    Neck ROM Normal     Oculomotor Screen    Ocular ROM Normal   Smooth Pursuit Normal   Saccades Normal   VOR Normal   VOR Cancellation Normal   Head Impulse Test Normal   Convergence Testing Not tested        Infrared Goggle Exam Vestibular Suppressant in Last 24 Hours? No  Exam Completed With: Infrared goggles   Spontaneous Nystagmus Negative   Gaze Evoked Nystagmus Negative   Head Shake Horizontal Nystagmus Negative   Supine Head-Hanging Test     Left Right   McLain-Hallpike Downbeating L torsional nystagmus, lasted > 1 min. negative   [facility] Supine Roll Test Downbeating L torsional, lasted for > 1 min. Nystagmus appeared stronger on this side Upbeating R torsional , lasted for > 1 min.   [facility] Forward Roll Test        BPPV Canal(s): L Anterior, R Posterior  BPPV Type: Cupulolithasis    Dynamic Visual Acuity (DVA)    Static Acuity (LogMar)    Horizontal Head Movement at 1 Hz (LogMar)    Horizontal Head Movement at 2 Hz (LogMar)            Assessment & Plan   CLINICAL IMPRESSIONS  Medical Diagnosis: Vertigo, (Requested BPPV order)    Treatment Diagnosis: BPPV   Impression/Assessment: Patient is a 73 year old female with positional vertigo complaints.  The following significant findings have been identified: Impaired balance, Dizziness, and Disequilibrium . These impairments interfere with their ability to perform self care tasks, recreational activities, household chores, driving , household  mobility, and community mobility as compared to previous level of function.     Clinical Decision Making (Complexity):  Clinical Presentation: Stable/Uncomplicated  Clinical Presentation Rationale: based on medical and personal factors listed in PT evaluation  Clinical Decision Making (Complexity): Low complexity    PLAN OF CARE  Treatment Interventions:  Interventions: Gait Training, Neuromuscular Re-education, Standardized Testing    Long Term Goals     PT Goal 1  Goal Identifier: BPPV  Goal Description: Client will demonstrate no further nystagmus indicating that positional vertigo has resolved in all canals.  Target Date: 05/24/24  PT Goal 2  Goal Identifier: FGA  Goal Description: Client will demonstrate improved standing balance on the FGA by scoring a 26/30 on the  FGA.  Goal Progress: baseline: 23/30  Target Date: 05/24/24      Frequency of Treatment: 1x/week  Duration of Treatment: 60 days    Recommended Referrals to Other Professionals:   Education Assessment:   Education Comments: Libertory Semont maneuver for anterior canal.    Risks and benefits of evaluation/treatment have been explained.   Patient/Family/caregiver agrees with Plan of Care.     Evaluation Time:     PT Eval, Low Complexity Minutes (20983): 25       Signing Clinician: Maria Buckner, PT      [facility]                                                                                   OUTPATIENT PHYSICAL THERAPY      PLAN OF TREATMENT FOR OUTPATIENT REHABILITATION   Patient's Last Name, First Name, Angela Rodriges YOB: 1950   Provider's Name   [facility]   Medical Record No.  [id number]     Onset Date: 03/18/24  Start of Care Date: 03/26/24     Medical Diagnosis:  Vertigo, (Requested BPPV order)      PT Treatment Diagnosis:  BPPV Plan of Treatment  Frequency/Duration: 1x/week/ 60 days    Certification date from 03/26/24 to 05/24/24         See note for  plan of treatment details and functional goals     Maria Buckner, PT                         I CERTIFY THE NEED FOR THESE SERVICES FURNISHED UNDER        THIS PLAN OF TREATMENT AND WHILE UNDER MY CARE     (Physician attestation of this document indicates review and certification of the therapy plan).              Referring Provider:  Carmen Pan    Initial Assessment  See Epic Evaluation- Start of Care Date: 03/26/24

## 2024-03-27 ENCOUNTER — TELEPHONE (OUTPATIENT)
Dept: FAMILY MEDICINE | Facility: CLINIC | Age: 74
End: 2024-03-27
Payer: COMMERCIAL

## 2024-03-27 DIAGNOSIS — H81.10 BENIGN PAROXYSMAL POSITIONAL VERTIGO, UNSPECIFIED LATERALITY: Primary | ICD-10-CM

## 2024-03-27 NOTE — TELEPHONE ENCOUNTER
----- Message from Maria Diaz PT sent at 3/27/2024  8:05 AM CDT -----  Regarding: RE: BPPV order  Good morning,  I am sorry but I am not seeing the new order with the BPPV diagnosis in the other orders tab.  Did you add it somewhere else that I should look?  Thank you,  Maria Diaz PT  ----- Message -----  From: Cristiano Pan, Carmen Peck PA-C  Sent: 3/26/2024   9:09 AM CDT  To: Maria Diaz PT  Subject: RE: BPPV order                                   Sure, thanks for seeing her.

## 2024-04-02 ENCOUNTER — THERAPY VISIT (OUTPATIENT)
Dept: PHYSICAL THERAPY | Facility: CLINIC | Age: 74
End: 2024-04-02
Attending: PHYSICIAN ASSISTANT
Payer: COMMERCIAL

## 2024-04-02 DIAGNOSIS — H81.10 BENIGN PAROXYSMAL POSITIONAL VERTIGO, UNSPECIFIED LATERALITY: ICD-10-CM

## 2024-04-02 DIAGNOSIS — R42 VERTIGO: Primary | ICD-10-CM

## 2024-04-02 PROCEDURE — 95992 CANALITH REPOSITIONING PROC: CPT | Mod: GP | Performed by: PHYSICAL THERAPIST

## 2024-04-02 PROCEDURE — 97535 SELF CARE MNGMENT TRAINING: CPT | Mod: GP | Performed by: PHYSICAL THERAPIST

## 2024-04-02 PROCEDURE — 97112 NEUROMUSCULAR REEDUCATION: CPT | Mod: GP | Performed by: PHYSICAL THERAPIST

## 2024-04-03 ENCOUNTER — MYC REFILL (OUTPATIENT)
Dept: FAMILY MEDICINE | Facility: CLINIC | Age: 74
End: 2024-04-03
Payer: COMMERCIAL

## 2024-04-03 DIAGNOSIS — E06.3 HYPOTHYROIDISM DUE TO HASHIMOTO'S THYROIDITIS: ICD-10-CM

## 2024-04-03 DIAGNOSIS — R76.8 ANTI-TPO ANTIBODIES PRESENT: ICD-10-CM

## 2024-04-03 RX ORDER — LEVOTHYROXINE SODIUM 25 UG/1
37.5 TABLET ORAL DAILY
Qty: 135 TABLET | Refills: 1 | Status: SHIPPED | OUTPATIENT
Start: 2024-04-03 | End: 2024-09-14

## 2024-04-03 NOTE — TELEPHONE ENCOUNTER
Prescription approved per [facility] Refill Protocol.  Jennifer Montgomery, RN  [facility] Triage Nurse

## 2024-04-09 ENCOUNTER — OFFICE VISIT (OUTPATIENT)
Dept: FAMILY MEDICINE | Facility: CLINIC | Age: 74
End: 2024-04-09
Payer: COMMERCIAL

## 2024-04-09 VITALS
BODY MASS INDEX: 29.83 KG/M2 | DIASTOLIC BLOOD PRESSURE: 72 MMHG | TEMPERATURE: 97.7 F | HEART RATE: 58 BPM | SYSTOLIC BLOOD PRESSURE: 138 MMHG | WEIGHT: 158 LBS | OXYGEN SATURATION: 98 % | HEIGHT: 61 IN | RESPIRATION RATE: 16 BRPM

## 2024-04-09 DIAGNOSIS — H81.10 BENIGN PAROXYSMAL POSITIONAL VERTIGO, UNSPECIFIED LATERALITY: Primary | ICD-10-CM

## 2024-04-09 DIAGNOSIS — I10 BENIGN HYPERTENSION: ICD-10-CM

## 2024-04-09 PROCEDURE — 99213 OFFICE O/P EST LOW 20 MIN: CPT | Performed by: PHYSICIAN ASSISTANT

## 2024-04-09 ASSESSMENT — PAIN SCALES - GENERAL: PAINLEVEL: NO PAIN (0)

## 2024-04-09 NOTE — PROGRESS NOTES
Assessment and Plan:     (H81.10) Benign paroxysmal positional vertigo, unspecified laterality  (primary encounter diagnosis)  Comment: resolved with vestibular therapy  Plan: cont prn exercises     (I10) Benign hypertension  Comment: decreased amlodipine from 2.5mg bid to 2.5mg daily at last visit, also on losartan 50mg bid, has had significant weight loss and had some lightheadedness with sitting to standing which has resolved with medication decrease, watches Bp at home and readings have been great  Plan: cont above, monitor BP at home, if continues to dip lower, can stop amlodipine altogether diomedes of symptomatic, sees pcp in the fall    KARYNA Melchor Same Day Provider         Subjective   Angela is a 73 year old, presenting for the following health issues:  No chief complaint on file.    History of Present Illness       Hypertension: She presents for follow up of hypertension.  She does check blood pressure  regularly outside of the clinic. Outpatient blood pressures have not been over 140/90. She follows a low salt diet.     She eats 2-3 servings of fruits and vegetables daily.She consumes 0 sweetened beverage(s) daily.She exercises with enough effort to increase her heart rate 30 to 60 minutes per day.  She exercises with enough effort to increase her heart rate 6 days per week.   She is taking medications regularly.     Angela is here for follow-up  She saw me about a month ago and was having some dizziness which she decribed as room spinning  She also had some lightheadedness when going from sitting to standing  BP was noted to be a bit low normal  She has had an intentional 30+lb weight loss over last year, currently adhering to a vegan diet    At last visit we decreased amlodipine from 2.5mg bid to 2.5mg daily  She did vestibular therapy  Her vertigo has resolved with therapy  Her lightheadedness has resolved   Bp running 112-126/57-72, she checks it a few times per week at home        "  Objective    LMP 07/23/2004   There is no height or weight on file to calculate BMI.    /72   Pulse 58   Temp 97.7  F (36.5  C) (Temporal)   Resp 16   Ht 1.549 m (5' 1\")   Wt 71.7 kg (158 lb)   LMP 07/23/2004   SpO2 98%   BMI 29.85 kg/m      Physical Exam     GENERAL: healthy, alert and no distress  RESP: lungs clear to auscultation - no rales, no rhonchi, no wheezes  CV: regular rates and rhythm, normal S1 S2, no S3 or S4 and no murmur, no click or rub   MS: extremities- no gross deformities noted, no edema      Signed Electronically by: Carmen Pan PA-C    "

## 2024-04-15 ENCOUNTER — MYC MEDICAL ADVICE (OUTPATIENT)
Dept: FAMILY MEDICINE | Facility: CLINIC | Age: 74
End: 2024-04-15
Payer: COMMERCIAL

## 2024-05-16 ENCOUNTER — TRANSFERRED RECORDS (OUTPATIENT)
Dept: HEALTH INFORMATION MANAGEMENT | Facility: CLINIC | Age: 74
End: 2024-05-16
Payer: COMMERCIAL

## 2024-08-29 DIAGNOSIS — F41.1 GENERALIZED ANXIETY DISORDER: ICD-10-CM

## 2024-08-29 DIAGNOSIS — I10 BENIGN HYPERTENSION: ICD-10-CM

## 2024-08-29 DIAGNOSIS — E78.5 HYPERLIPIDEMIA, UNSPECIFIED HYPERLIPIDEMIA TYPE: ICD-10-CM

## 2024-08-29 RX ORDER — AMLODIPINE BESYLATE 2.5 MG/1
2.5 TABLET ORAL 2 TIMES DAILY
Qty: 180 TABLET | Refills: 1 | Status: SHIPPED | OUTPATIENT
Start: 2024-08-29

## 2024-08-29 RX ORDER — CITALOPRAM HYDROBROMIDE 20 MG/1
20 TABLET ORAL DAILY
Qty: 90 TABLET | Refills: 3 | Status: SHIPPED | OUTPATIENT
Start: 2024-08-29

## 2024-08-29 RX ORDER — PRAVASTATIN SODIUM 20 MG
20 TABLET ORAL DAILY
Qty: 90 TABLET | Refills: 1 | Status: SHIPPED | OUTPATIENT
Start: 2024-08-29

## 2024-09-04 ENCOUNTER — PATIENT OUTREACH (OUTPATIENT)
Dept: CARE COORDINATION | Facility: CLINIC | Age: 74
End: 2024-09-04
Payer: COMMERCIAL

## 2024-09-14 DIAGNOSIS — E06.3 HYPOTHYROIDISM DUE TO HASHIMOTO'S THYROIDITIS: ICD-10-CM

## 2024-09-14 DIAGNOSIS — R76.8 ANTI-TPO ANTIBODIES PRESENT: ICD-10-CM

## 2024-09-16 RX ORDER — LEVOTHYROXINE SODIUM 25 UG/1
37.5 TABLET ORAL DAILY
Qty: 135 TABLET | Refills: 0 | Status: SHIPPED | OUTPATIENT
Start: 2024-09-16

## 2024-10-02 ENCOUNTER — PATIENT OUTREACH (OUTPATIENT)
Dept: CARE COORDINATION | Facility: CLINIC | Age: 74
End: 2024-10-02
Payer: COMMERCIAL

## 2024-10-10 ENCOUNTER — HOSPITAL ENCOUNTER (OUTPATIENT)
Dept: MAMMOGRAPHY | Facility: CLINIC | Age: 74
Discharge: HOME | End: 2024-10-10
Attending: INTERNAL MEDICINE | Admitting: INTERNAL MEDICINE
Payer: COMMERCIAL

## 2024-10-10 DIAGNOSIS — Z12.31 VISIT FOR SCREENING MAMMOGRAM: ICD-10-CM

## 2024-10-10 PROCEDURE — 77063 BREAST TOMOSYNTHESIS BI: CPT

## 2024-10-15 SDOH — HEALTH STABILITY: PHYSICAL HEALTH: ON AVERAGE, HOW MANY MINUTES DO YOU ENGAGE IN EXERCISE AT THIS LEVEL?: 50 MIN

## 2024-10-15 SDOH — HEALTH STABILITY: PHYSICAL HEALTH: ON AVERAGE, HOW MANY DAYS PER WEEK DO YOU ENGAGE IN MODERATE TO STRENUOUS EXERCISE (LIKE A BRISK WALK)?: 6 DAYS

## 2024-10-15 ASSESSMENT — SOCIAL DETERMINANTS OF HEALTH (SDOH): HOW OFTEN DO YOU GET TOGETHER WITH FRIENDS OR RELATIVES?: THREE TIMES A WEEK

## 2024-10-18 ENCOUNTER — OFFICE VISIT (OUTPATIENT)
Dept: FAMILY MEDICINE | Facility: CLINIC | Age: 74
End: 2024-10-18
Attending: INTERNAL MEDICINE
Payer: COMMERCIAL

## 2024-10-18 VITALS
HEART RATE: 64 BPM | DIASTOLIC BLOOD PRESSURE: 80 MMHG | BODY MASS INDEX: 29.13 KG/M2 | HEIGHT: 61 IN | OXYGEN SATURATION: 99 % | SYSTOLIC BLOOD PRESSURE: 139 MMHG | RESPIRATION RATE: 16 BRPM | TEMPERATURE: 98.3 F | WEIGHT: 154.3 LBS

## 2024-10-18 DIAGNOSIS — Z79.899 MEDICATION MANAGEMENT: ICD-10-CM

## 2024-10-18 DIAGNOSIS — I10 BENIGN HYPERTENSION: ICD-10-CM

## 2024-10-18 DIAGNOSIS — R10.31 RLQ ABDOMINAL PAIN: ICD-10-CM

## 2024-10-18 DIAGNOSIS — E06.3 HYPOTHYROIDISM DUE TO HASHIMOTO'S THYROIDITIS: ICD-10-CM

## 2024-10-18 DIAGNOSIS — Z23 NEED FOR PROPHYLACTIC VACCINATION AND INOCULATION AGAINST INFLUENZA: ICD-10-CM

## 2024-10-18 DIAGNOSIS — E66.3 OVERWEIGHT (BMI 25.0-29.9): ICD-10-CM

## 2024-10-18 DIAGNOSIS — N18.31 STAGE 3A CHRONIC KIDNEY DISEASE (H): ICD-10-CM

## 2024-10-18 DIAGNOSIS — Z00.00 ENCOUNTER FOR MEDICARE ANNUAL WELLNESS EXAM: Primary | ICD-10-CM

## 2024-10-18 DIAGNOSIS — E78.5 HYPERLIPIDEMIA, UNSPECIFIED HYPERLIPIDEMIA TYPE: ICD-10-CM

## 2024-10-18 DIAGNOSIS — Z23 HIGH PRIORITY FOR 2019-NCOV VACCINE: ICD-10-CM

## 2024-10-18 DIAGNOSIS — Z96.653 S/P TOTAL KNEE REPLACEMENT, BILATERAL: ICD-10-CM

## 2024-10-18 DIAGNOSIS — H61.22 IMPACTED CERUMEN OF LEFT EAR: ICD-10-CM

## 2024-10-18 LAB
ALBUMIN SERPL BCG-MCNC: 4.4 G/DL (ref 3.5–5.2)
ALBUMIN UR-MCNC: NEGATIVE MG/DL
ALP SERPL-CCNC: 84 U/L (ref 40–150)
ALT SERPL W P-5'-P-CCNC: 12 U/L (ref 0–50)
ANION GAP SERPL CALCULATED.3IONS-SCNC: 10 MMOL/L (ref 7–15)
APPEARANCE UR: CLEAR
AST SERPL W P-5'-P-CCNC: 25 U/L (ref 0–45)
BILIRUB SERPL-MCNC: 0.7 MG/DL
BILIRUB UR QL STRIP: NEGATIVE
BUN SERPL-MCNC: 8.4 MG/DL (ref 8–23)
CALCIUM SERPL-MCNC: 10.1 MG/DL (ref 8.8–10.4)
CHLORIDE SERPL-SCNC: 108 MMOL/L (ref 98–107)
CHOLEST SERPL-MCNC: 164 MG/DL
COLOR UR AUTO: YELLOW
CREAT SERPL-MCNC: 0.9 MG/DL (ref 0.51–0.95)
CREAT UR-MCNC: 57.8 MG/DL
CRP SERPL-MCNC: <3 MG/L
EGFRCR SERPLBLD CKD-EPI 2021: 68 ML/MIN/1.73M2
ERYTHROCYTE [DISTWIDTH] IN BLOOD BY AUTOMATED COUNT: 12.3 % (ref 10–15)
FASTING STATUS PATIENT QL REPORTED: YES
FASTING STATUS PATIENT QL REPORTED: YES
GLUCOSE SERPL-MCNC: 101 MG/DL (ref 70–99)
GLUCOSE UR STRIP-MCNC: NEGATIVE MG/DL
HCO3 SERPL-SCNC: 26 MMOL/L (ref 22–29)
HCT VFR BLD AUTO: 39.2 % (ref 35–47)
HDLC SERPL-MCNC: 66 MG/DL
HGB BLD-MCNC: 12.9 G/DL (ref 11.7–15.7)
HGB UR QL STRIP: ABNORMAL
KETONES UR STRIP-MCNC: NEGATIVE MG/DL
LDLC SERPL CALC-MCNC: 76 MG/DL
LEUKOCYTE ESTERASE UR QL STRIP: NEGATIVE
MCH RBC QN AUTO: 30.2 PG (ref 26.5–33)
MCHC RBC AUTO-ENTMCNC: 32.9 G/DL (ref 31.5–36.5)
MCV RBC AUTO: 92 FL (ref 78–100)
MICROALBUMIN UR-MCNC: <12 MG/L
MICROALBUMIN/CREAT UR: NORMAL MG/G{CREAT}
NITRATE UR QL: NEGATIVE
NONHDLC SERPL-MCNC: 98 MG/DL
PH UR STRIP: 7 [PH] (ref 5–7)
PLATELET # BLD AUTO: 165 10E3/UL (ref 150–450)
POTASSIUM SERPL-SCNC: 5 MMOL/L (ref 3.4–5.3)
PROT SERPL-MCNC: 6.6 G/DL (ref 6.4–8.3)
RBC # BLD AUTO: 4.27 10E6/UL (ref 3.8–5.2)
RBC #/AREA URNS AUTO: NORMAL /HPF
SODIUM SERPL-SCNC: 144 MMOL/L (ref 135–145)
SP GR UR STRIP: 1.01 (ref 1–1.03)
TRIGL SERPL-MCNC: 109 MG/DL
UROBILINOGEN UR STRIP-ACNC: 0.2 E.U./DL
WBC # BLD AUTO: 5.4 10E3/UL (ref 4–11)
WBC #/AREA URNS AUTO: NORMAL /HPF

## 2024-10-18 PROCEDURE — 82043 UR ALBUMIN QUANTITATIVE: CPT | Performed by: INTERNAL MEDICINE

## 2024-10-18 PROCEDURE — 82570 ASSAY OF URINE CREATININE: CPT | Performed by: INTERNAL MEDICINE

## 2024-10-18 PROCEDURE — 85027 COMPLETE CBC AUTOMATED: CPT | Performed by: INTERNAL MEDICINE

## 2024-10-18 PROCEDURE — 86140 C-REACTIVE PROTEIN: CPT | Performed by: INTERNAL MEDICINE

## 2024-10-18 PROCEDURE — G0008 ADMIN INFLUENZA VIRUS VAC: HCPCS | Performed by: INTERNAL MEDICINE

## 2024-10-18 PROCEDURE — G0439 PPPS, SUBSEQ VISIT: HCPCS | Performed by: INTERNAL MEDICINE

## 2024-10-18 PROCEDURE — 91320 SARSCV2 VAC 30MCG TRS-SUC IM: CPT | Performed by: INTERNAL MEDICINE

## 2024-10-18 PROCEDURE — 81001 URINALYSIS AUTO W/SCOPE: CPT | Performed by: INTERNAL MEDICINE

## 2024-10-18 PROCEDURE — 80053 COMPREHEN METABOLIC PANEL: CPT | Performed by: INTERNAL MEDICINE

## 2024-10-18 PROCEDURE — 99214 OFFICE O/P EST MOD 30 MIN: CPT | Mod: 25 | Performed by: INTERNAL MEDICINE

## 2024-10-18 PROCEDURE — 90662 IIV NO PRSV INCREASED AG IM: CPT | Performed by: INTERNAL MEDICINE

## 2024-10-18 PROCEDURE — 69209 REMOVE IMPACTED EAR WAX UNI: CPT | Mod: LT | Performed by: INTERNAL MEDICINE

## 2024-10-18 PROCEDURE — 80061 LIPID PANEL: CPT | Performed by: INTERNAL MEDICINE

## 2024-10-18 PROCEDURE — 36415 COLL VENOUS BLD VENIPUNCTURE: CPT | Performed by: INTERNAL MEDICINE

## 2024-10-18 PROCEDURE — 90480 ADMN SARSCOV2 VAC 1/ONLY CMP: CPT | Performed by: INTERNAL MEDICINE

## 2024-10-18 RX ORDER — PRAVASTATIN SODIUM 20 MG
20 TABLET ORAL DAILY
Qty: 90 TABLET | Refills: 3 | Status: SHIPPED | OUTPATIENT
Start: 2024-10-18

## 2024-10-18 ASSESSMENT — PAIN SCALES - GENERAL: PAINLEVEL: NO PAIN (0)

## 2024-10-18 NOTE — PATIENT INSTRUCTIONS
Monitor your blood pressure once a week  at home.  Bring those readings on your next visit.  Notify us if your blood pressure readings consistently stays greater than 140/90.      Labs today    Follow up in 6 months for Blood Pressure   Seek sooner medical attention if there is any worsening of symptoms or problems.     Follow up in one year for physical           Patient Education   Preventive Care Advice   This is general advice given by our system to help you stay healthy. However, your care team may have specific advice just for you. Please talk to your care team about your preventive care needs.  Nutrition  Eat 5 or more servings of fruits and vegetables each day.  Try wheat bread, brown rice and whole grain pasta (instead of white bread, rice, and pasta).  Get enough calcium and vitamin D. Check the label on foods and aim for 100% of the RDA (recommended daily allowance).  Lifestyle  Exercise at least 150 minutes each week  (30 minutes a day, 5 days a week).  Do muscle strengthening activities 2 days a week. These help control your weight and prevent disease.  No smoking.  Wear sunscreen to prevent skin cancer.  Have a dental exam and cleaning every 6 months.  Yearly exams  See your health care team every year to talk about:  Any changes in your health.  Any medicines your care team has prescribed.  Preventive care, family planning, and ways to prevent chronic diseases.  Shots (vaccines)   HPV shots (up to age 26), if you've never had them before.  Hepatitis B shots (up to age 59), if you've never had them before.  COVID-19 shot: Get this shot when it's due.  Flu shot: Get a flu shot every year.  Tetanus shot: Get a tetanus shot every 10 years.  Pneumococcal, hepatitis A, and RSV shots: Ask your care team if you need these based on your risk.  Shingles shot (for age 50 and up)  General health tests  Diabetes screening:  Starting at age 35, Get screened for diabetes at least every 3 years.  If you are younger  than age 35, ask your care team if you should be screened for diabetes.  Cholesterol test: At age 39, start having a cholesterol test every 5 years, or more often if advised.  Bone density scan (DEXA): At age 50, ask your care team if you should have this scan for osteoporosis (brittle bones).  Hepatitis C: Get tested at least once in your life.  STIs (sexually transmitted infections)  Before age 24: Ask your care team if you should be screened for STIs.  After age 24: Get screened for STIs if you're at risk. You are at risk for STIs (including HIV) if:  You are sexually active with more than one person.  You don't use condoms every time.  You or a partner was diagnosed with a sexually transmitted infection.  If you are at risk for HIV, ask about PrEP medicine to prevent HIV.  Get tested for HIV at least once in your life, whether you are at risk for HIV or not.  Cancer screening tests  Cervical cancer screening: If you have a cervix, begin getting regular cervical cancer screening tests starting at age 21.  Breast cancer scan (mammogram): If you've ever had breasts, begin having regular mammograms starting at age 40. This is a scan to check for breast cancer.  Colon cancer screening: It is important to start screening for colon cancer at age 45.  Have a colonoscopy test every 10 years (or more often if you're at risk) Or, ask your provider about stool tests like a FIT test every year or Cologuard test every 3 years.  To learn more about your testing options, visit:   .  For help making a decision, visit:   https://bit.ly/pr67966.  Prostate cancer screening test: If you have a prostate, ask your care team if a prostate cancer screening test (PSA) at age 55 is right for you.  Lung cancer screening: If you are a current or former smoker ages 50 to 80, ask your care team if ongoing lung cancer screenings are right for you.  For informational purposes only. Not to replace the advice of your health care provider.  Copyright   2023 [facility]. All rights reserved. Clinically reviewed by the [facility] Transitions Program. [employer] [id number] - REV 01/24.  Hearing Loss: Care Instructions  Overview     Hearing loss is a sudden or slow decrease in how well you hear. It can range from slight to profound. Permanent hearing loss can occur with aging. It also can happen when you are exposed long-term to loud noise. Examples include listening to loud music, riding motorcycles, or being around other loud machines.  Hearing loss can affect your work and home life. It can make you feel lonely or depressed. You may feel that you have lost your independence. But hearing aids and other devices can help you hear better and feel connected to others.  Follow-up care is a key part of your treatment and safety. Be sure to make and go to all appointments, and call your doctor if you are having problems. It's also a good idea to know your test results and keep a list of the medicines you take.  How can you care for yourself at home?  Avoid loud noises whenever possible. This helps keep your hearing from getting worse.  Always wear hearing protection around loud noises.  Wear a hearing aid as directed.  A professional can help you pick a hearing aid that will work best for you.  You can also get hearing aids over the counter for mild to moderate hearing loss.  Have hearing tests as your doctor suggests. They can show whether your hearing has changed. Your hearing aid may need to be adjusted.  Use other devices as needed. These may include:  Telephone amplifiers and hearing aids that can connect to a television, stereo, radio, or microphone.  Devices that use lights or vibrations. These alert you to the doorbell, a ringing telephone, or a baby monitor.  Television closed-captioning. This shows the words at the bottom of the screen. Most new TVs can do this.  TTY (text telephone). This lets you type messages back and forth on the  "telephone instead of talking or listening. These devices are also called TDD. When messages are typed on the keyboard, they are sent over the phone line to a receiving TTY. The message is shown on a monitor.  Use text messaging, social media, and email if it is hard for you to communicate by telephone.  Try to learn a listening technique called speechreading. It is not lipreading. You pay attention to people's gestures, expressions, posture, and tone of voice. These clues can help you understand what a person is saying. Face the person you are talking to, and have them face you. Make sure the lighting is good. You need to see the other person's face clearly.  Think about counseling if you need help to adjust to your hearing loss.  When should you call for help?  Watch closely for changes in your health, and be sure to contact your doctor if:    You think your hearing is getting worse.     You have new symptoms, such as dizziness or nausea.   Where can you learn more?  Go to https://www.[employer].net/patiented  Enter R798 in the search box to learn more about \"Hearing Loss: Care Instructions.\"  Current as of: September 27, 2023  Content Version: 14.2 2024 [facility] [employer].   Care instructions adapted under license by your healthcare professional. If you have questions about a medical condition or this instruction, always ask your healthcare professional. [employer] disclaims any warranty or liability for your use of this information.       "

## 2024-10-18 NOTE — PROGRESS NOTES
Preventive Care Visit  [facility]  Merary Geller MD, Internal Medicine  Oct 18, 2024      Angela was seen today for physical.    Diagnoses and all orders for this visit:    Encounter for Medicare annual wellness exam  Preventive health counseling was also done.  Patient had mammogram done on October 10, 2024  She had colonoscopy done on December 13, 2023    Stage 3a chronic kidney disease (H)  -     Albumin Random Urine Quantitative with Creat Ratio; Future    Hypothyroidism due to Hashimoto's thyroiditis  Comments:  follows endocrinologist  She had TSH done recently    Hyperlipidemia, unspecified hyperlipidemia type  -     Lipid panel reflex to direct LDL Non-fasting; Future  -     pravastatin (PRAVACHOL) 20 MG tablet; Take 1 tablet (20 mg) by mouth daily. for cholestrol    Medication management  -     Comprehensive metabolic panel; Future    Reported maintenance with vitamin D and Calcium supplementation    S/p total knee replacement, bilateral  Past history doing well    Overweight (BMI 25.0-29.9)  Working on healthy eating and regular physical activity  Reported losing starting moderately strict vegan/low fat diet with weight watchers.   RLQ abdominal pain  -     CRP, inflammation; Future  -     CBC with platelets; Future  -     UA with Microscopic reflex to Culture - lab collect; Future  This is intermittent and not all the time  It is not severe  Denied any constipation no urinary symptoms  On exam her abdomen was soft and there was no guarding or rigidity  We will do lab work and if it shows any abnormality will consider imaging  Advised the patient if the pain continued to happen and does not get better or get worse then she needs to seek medical attention    Impacted cerumen of left ear  Ear wash was  done by nursing staff    Other orders  -     PRIMARY CARE FOLLOW-UP SCHEDULING  -     REVIEW OF HEALTH MAINTENANCE PROTOCOL ORDERS    Hypertension:  Reported inconsistency in checking  BP at home.  Inquired about levothyroxine with relation to BP. Informed that there is no correlation.  Patient says blood pressure is high because she is taking amlodipine only once a day    Taking amlodipine 1x daily and will increase to 2x daily if BP does not stabilize  BP elevated at 150/84, rechecked at 139/80  Instructed to take Amlodipine 2x daily      Positive status regarding bilateral knee replacement  Patient confirmed having living will  MA executed left ear wash for crumen impaction  Instructed to maintain high fiber diet.  Will see again in 6 months d/t BP  Patient agreed to flu and covid shot in clinic today      Labs ordered.  Up to date with RSV vaccine  Mammo done 10/10/24  Bone density 2022  Colonoscopy done 12/23    Choco Miller is a 74 year old, presenting for the following:  Physical          Health Care Directive  Patient has a Health Care Directive on file  Advance care planning document is on file and is current.    HPI  Angela is a 74 year old, presenting for the following:  Physical          10/15/2024   General Health   How would you rate your overall physical health? Excellent   Feel stress (tense, anxious, or unable to sleep) Not at all            10/15/2024   Nutrition   Diet: Vegetarian/vegan    Gluten-free/reduced       Multiple values from one day are sorted in reverse-chronological order         10/15/2024   Exercise   Days per week of moderate/strenous exercise 6 days   Average minutes spent exercising at this level 50 min            10/15/2024   Social Factors   Frequency of gathering with friends or relatives Three times a week   Worry food won't last until get money to buy more No   Food not last or not have enough money for food? No   Do you have housing? (Housing is defined as stable permanent housing and does not include staying ouside in a car, in a tent, in an abandoned building, in an overnight shelter, or couch-surfing.) Yes   Are you worried about losing your  housing? No   Lack of transportation? No   Unable to get utilities (heat,electricity)? No            10/15/2024   Fall Risk   Fallen 2 or more times in the past year? No   Trouble with walking or balance? No             10/15/2024   Activities of Daily Living- Home Safety   Needs help with the following daily activites None of the above   Safety concerns in the home None of the above            10/15/2024   Dental   Dentist two times every year? Yes            10/15/2024   Hearing Screening   Hearing concerns? (!) I NEED TO ASK PEOPLE TO SPEAK UP OR REPEAT THEMSELVES.            10/15/2024   Driving Risk Screening   Patient/family members have concerns about driving No            10/15/2024   General Alertness/Fatigue Screening   Have you been more tired than usual lately? No            10/15/2024   Urinary Incontinence Screening   Bothered by leaking urine in past 6 months No            10/15/2024   TB Screening   Were you born outside of the [place]? No            Today's PHQ-2 Score:       10/17/2024    10:51 AM   PHQ-2 ( 1999 Pfizer)   Q1: Little interest or pleasure in doing things 0   Q2: Feeling down, depressed or hopeless 0   PHQ-2 Score 0   Q1: Little interest or pleasure in doing things Not at all   Q2: Feeling down, depressed or hopeless Not at all   PHQ-2 Score 0           10/15/2024   Substance Use   Alcohol more than 3/day or more than 7/wk No   Do you have a current opioid prescription? No   How severe/bad is pain from 1 to 10? 1/10   Do you use any other substances recreationally? No        Social History     Tobacco Use    Smoking status: Never    Smokeless tobacco: Never   Vaping Use    Vaping status: Never Used   Substance Use Topics    Alcohol use: Yes     Comment: 1 per week    Drug use: Never           10/10/2024   LAST FHS-7 RESULTS   1st degree relative breast or ovarian cancer No   Any relative bilateral breast cancer No   Any male have breast cancer No   Any ONE woman have BOTH breast AND  ovarian cancer No   Any woman with breast cancer before 50yrs No   2 or more relatives with breast AND/OR ovarian cancer No   2 or more relatives with breast AND/OR bowel cancer No           Mammogram Screening - Mammogram every 1-2 years updated in Health Maintenance based on mutual decision making    ASCVD Risk   The 10-year ASCVD risk score (Cedrick PABLO, et al., 2019) is: 20.1%    Values used to calculate the score:      Age: 74 years      Sex: Female      Is Non- : No      Diabetic: No      Tobacco smoker: No      Systolic Blood Pressure: 150 mmHg      Is BP treated: Yes      HDL Cholesterol: 66 mg/dL      Total Cholesterol: 186 mg/dL          Reviewed and updated as needed this visit by Provider                      Current providers sharing in care for this patient include:  Patient Care Team:  Merary Geller MD as PCP - General (Internal Medicine)  Satish Scott LMFT as Other (see comments) (Therapist)  Raphael Aguero MD as MD (Ophthalmology)  Merary Geller MD as Assigned PCP  Frandy Estes MD as MD (Dermatology)  Masters, Puja Banks DO as Assigned OBGYN Provider    The following health maintenance items are reviewed in Epic and correct as of today:  Health Maintenance   Topic Date Due    INFLUENZA VACCINE (1) 09/01/2024    COVID-19 Vaccine (9 - 2024-25 season) 09/01/2024    LIPID  10/03/2024    MICROALBUMIN  10/03/2024    TSH W/FREE T4 REFLEX  10/03/2024    BMP  03/18/2025    HEMOGLOBIN  03/18/2025    MAMMO SCREENING  10/10/2025    MEDICARE ANNUAL WELLNESS VISIT  10/18/2025    ANNUAL REVIEW OF HM ORDERS  10/18/2025    FALL RISK ASSESSMENT  10/18/2025    GLUCOSE  03/18/2027    DTAP/TDAP/TD IMMUNIZATION (3 - Td or Tdap) 07/24/2027    ADVANCE CARE PLANNING  10/18/2029    DEXA  09/22/2037    HEPATITIS C SCREENING  Completed    PHQ-2 (once per calendar year)  Completed    Pneumococcal Vaccine: 65+ Years  Completed    URINALYSIS  Completed    ZOSTER IMMUNIZATION  " Completed    RSV VACCINE  Completed    HPV IMMUNIZATION  Aged Out    MENINGITIS IMMUNIZATION  Aged Out    RSV MONOCLONAL ANTIBODY  Aged Out    COLORECTAL CANCER SCREENING  Discontinued       Review of Systems  Constitutional, HEENT, cardiovascular, pulmonary, GI, , musculoskeletal, neuro, skin, endocrine and psych systems are negative, except as otherwise noted.     Objective    Exam  BP (!) 150/84 (BP Location: Right arm, Patient Position: Sitting, Cuff Size: Adult Regular)   Pulse 64   Temp 98.3  F (36.8  C) (Temporal)   Resp 16   Ht 1.54 m (5' 0.63\")   Wt 70 kg (154 lb 4.8 oz)   LMP 07/23/2004   SpO2 99%   BMI 29.51 kg/m     Estimated body mass index is 29.51 kg/m  as calculated from the following:    Height as of this encounter: 1.54 m (5' 0.63\").    Weight as of this encounter: 70 kg (154 lb 4.8 oz).    Physical Exam  GENERAL: alert and no distress  EYES: Eyes grossly normal to inspection, PERRL and conjunctivae and sclerae normal  HENT: ear canals and TM's normal, nose and mouth without ulcers or lesions Left ear crumen impaction  NECK: no adenopathy, no asymmetry, masses, or scars  RESP: lungs clear to auscultation - no rales, rhonchi or wheezes  BREAST: normal without masses, tenderness or nipple discharge and no palpable axillary masses or adenopathy   CV: regular rate and rhythm, normal S1 S2, no S3 or S4, no murmur, click or rub, no peripheral edema  ABDOMEN: soft, nontender, no hepatosplenomegaly, no masses and bowel sounds normal  MS: no gross musculoskeletal defects noted, no edema Slight leg edema  SKIN: no suspicious lesions or rashes Spider veins, Surgical Scars on knees  NEURO: Normal strength and tone, mentation intact and speech normal  PSYCH: mentation appears normal, affect normal/bright  LYMPH: no cervical, supraclavicular, axillary, or inguinal adenopathy    Labs pending           10/18/2024   Mini Cog   Clock Draw Score 2 Normal   3 Item Recall 3 objects recalled   Mini Cog Total " Score 5                 Signed Electronically by: Merary Geller MD    This document serves as a record of the services and decisions personally performed and made by Dr. Geller. It was created on her behalf by Earnestine Carmona, a trained medical scribe. The creation of this document is based the provider's statements to the medical scribe.

## 2024-10-20 NOTE — RESULT ENCOUNTER NOTE
Jessica Miller    This is to inform you regarding your test result.    The testing of your kidney function, liver function and electrolytes was satisfactory   Glucose which is your blood sugar is slightly elevated.  Avoid high sugar containing food.  Urine test is negative for infection  Urine for Microalbumin is normal.  Your total cholesterol is normal.  HDL which is called good cholesterol is normal.  Your LDL cholesterol is normal.  This is often call bad cholesterol and high levels increase the risk for heart attacks and strokes.  Your triglycerides are normal.  C-reactive protein which is test to check for inflammation is normal  CBC result which includes white count Hemoglobin and  Platelet Counts is normal.             Sincerely,      Dr.Nasima Yimi MD,FACP

## 2024-11-05 ENCOUNTER — TRANSFERRED RECORDS (OUTPATIENT)
Dept: HEALTH INFORMATION MANAGEMENT | Facility: CLINIC | Age: 74
End: 2024-11-05
Payer: COMMERCIAL

## 2025-02-07 DIAGNOSIS — I10 BENIGN HYPERTENSION: ICD-10-CM

## 2025-02-10 RX ORDER — AMLODIPINE BESYLATE 2.5 MG/1
2.5 TABLET ORAL 2 TIMES DAILY
Qty: 180 TABLET | Refills: 0 | Status: SHIPPED | OUTPATIENT
Start: 2025-02-10

## 2025-04-13 ENCOUNTER — HEALTH MAINTENANCE LETTER (OUTPATIENT)
Age: 75
End: 2025-04-13

## 2025-04-21 ENCOUNTER — OFFICE VISIT (OUTPATIENT)
Dept: FAMILY MEDICINE | Facility: CLINIC | Age: 75
End: 2025-04-21
Payer: COMMERCIAL

## 2025-04-21 VITALS
WEIGHT: 158.4 LBS | BODY MASS INDEX: 29.91 KG/M2 | HEIGHT: 61 IN | DIASTOLIC BLOOD PRESSURE: 84 MMHG | HEART RATE: 64 BPM | OXYGEN SATURATION: 100 % | RESPIRATION RATE: 14 BRPM | SYSTOLIC BLOOD PRESSURE: 133 MMHG | TEMPERATURE: 97 F

## 2025-04-21 DIAGNOSIS — I10 BENIGN HYPERTENSION: ICD-10-CM

## 2025-04-21 DIAGNOSIS — N18.31 STAGE 3A CHRONIC KIDNEY DISEASE (H): ICD-10-CM

## 2025-04-21 DIAGNOSIS — E78.5 HYPERLIPIDEMIA, UNSPECIFIED HYPERLIPIDEMIA TYPE: ICD-10-CM

## 2025-04-21 DIAGNOSIS — Z23 HIGH PRIORITY FOR 2019-NCOV VACCINE: ICD-10-CM

## 2025-04-21 DIAGNOSIS — F41.1 GENERALIZED ANXIETY DISORDER: Primary | ICD-10-CM

## 2025-04-21 DIAGNOSIS — Z79.899 MEDICATION MANAGEMENT: ICD-10-CM

## 2025-04-21 DIAGNOSIS — E66.3 OVERWEIGHT (BMI 25.0-29.9): ICD-10-CM

## 2025-04-21 DIAGNOSIS — Z13.0 SCREENING FOR DEFICIENCY ANEMIA: ICD-10-CM

## 2025-04-21 DIAGNOSIS — E06.3 HYPOTHYROIDISM DUE TO HASHIMOTO'S THYROIDITIS: ICD-10-CM

## 2025-04-21 PROCEDURE — 3079F DIAST BP 80-89 MM HG: CPT | Performed by: INTERNAL MEDICINE

## 2025-04-21 PROCEDURE — 91320 SARSCV2 VAC 30MCG TRS-SUC IM: CPT | Performed by: INTERNAL MEDICINE

## 2025-04-21 PROCEDURE — 3075F SYST BP GE 130 - 139MM HG: CPT | Performed by: INTERNAL MEDICINE

## 2025-04-21 PROCEDURE — 1126F AMNT PAIN NOTED NONE PRSNT: CPT | Performed by: INTERNAL MEDICINE

## 2025-04-21 PROCEDURE — 90480 ADMN SARSCOV2 VAC 1/ONLY CMP: CPT | Performed by: INTERNAL MEDICINE

## 2025-04-21 PROCEDURE — 99214 OFFICE O/P EST MOD 30 MIN: CPT | Performed by: INTERNAL MEDICINE

## 2025-04-21 RX ORDER — LOSARTAN POTASSIUM 50 MG/1
50 TABLET ORAL 2 TIMES DAILY
Qty: 180 TABLET | Refills: 3 | Status: SHIPPED | OUTPATIENT
Start: 2025-04-21

## 2025-04-21 RX ORDER — LEVOTHYROXINE SODIUM 25 UG/1
37.5 TABLET ORAL DAILY
COMMUNITY
Start: 2025-04-21

## 2025-04-21 RX ORDER — AMLODIPINE BESYLATE 2.5 MG/1
2.5 TABLET ORAL 2 TIMES DAILY
Qty: 180 TABLET | Refills: 3 | Status: SHIPPED | OUTPATIENT
Start: 2025-04-21

## 2025-04-21 RX ORDER — PRAVASTATIN SODIUM 20 MG
20 TABLET ORAL DAILY
Qty: 90 TABLET | Refills: 3 | Status: SHIPPED | OUTPATIENT
Start: 2025-04-21

## 2025-04-21 ASSESSMENT — PAIN SCALES - GENERAL: PAINLEVEL_OUTOF10: NO PAIN (0)

## 2025-04-21 NOTE — PROGRESS NOTES
"Assessment & Plan     Angela was seen today for hypertension and imm/inj.    Diagnoses and all orders for this visit:    Generalized anxiety disorder  Takes Celexa and tolerates well    Hypothyroidism due to Hashimoto's thyroiditis  Comments:  follows endocrinologist  Pt follow endocrinologist for thyroid.   Pt advised to have pharmacist send refill request for levothyroxine to her endocrinologist.   Last TSH checked on 7/29/24 and it was 3.27     Stage 3a chronic kidney disease (H)  -     Parathyroid Hormone Intact; Future  Stable    Overweight (BMI 25.0-29.9)  Working on healthy eating and regular physical activity  Reported losing starting moderately strict vegan/low fat diet with weight watchers.     Benign hypertension  -     amLODIPine (NORVASC) 2.5 MG tablet; Take 1 tablet (2.5 mg) by mouth 2 times daily. for Blood Pressure  -     losartan (COZAAR) 50 MG tablet; Take 1 tablet (50 mg) by mouth 2 times daily. for Blood Pressure  Takes amlodipine and losartan  BP today was 133/84  Pt advised to monitor at home  When she monitors she states it is normally under control.     Hyperlipidemia, unspecified hyperlipidemia type  -     pravastatin (PRAVACHOL) 20 MG tablet; Take 1 tablet (20 mg) by mouth daily. for cholestrol  -     Lipid panel reflex to direct LDL Fasting; Future  Takes pravastatin    Medication management  -     Comprehensive metabolic panel; Future    Screening for deficiency anemia  -     CBC with platelets; Future    High priority for 2019-nCoV vaccine  Pt receive Covid booster today.     Other orders  -     REVIEW OF HEALTH MAINTENANCE PROTOCOL ORDERS  -     COVID-19 12+ (PFIZER)    Other  Orders were placed for pt to complete labs before she sees me on 10/21/25    BMI  Estimated body mass index is 30.3 kg/m  as calculated from the following:    Height as of this encounter: 1.54 m (5' 0.63\").    Weight as of this encounter: 71.8 kg (158 lb 6.4 oz).   Weight management plan: Discussed healthy diet " "and exercise guidelines      Subjective   Angela is a 74 year old, presenting for the following health issues:  Hypertension and Imm/Inj (COVID-19 VACCINE)    History of Present Illness       Hypertension: She presents for follow up of hypertension.  She does not check blood pressure  regularly outside of the clinic. Outpatient blood pressures have not been over 140/90. She follows a low salt diet.     She eats 4 or more servings of fruits and vegetables daily.She consumes 0 sweetened beverage(s) daily.She exercises with enough effort to increase her heart rate 30 to 60 minutes per day.  She exercises with enough effort to increase her heart rate 5 days per week.   She is taking medications regularly.      Review of Systems  Constitutional, HEENT, cardiovascular, pulmonary, GI, , musculoskeletal, neuro, skin, endocrine and psych systems are negative, except as otherwise noted.      Objective    /84 (BP Location: Right arm, Patient Position: Sitting, Cuff Size: Adult Regular)   Pulse 64   Temp 97  F (36.1  C) (Temporal)   Resp 14   Ht 1.54 m (5' 0.63\")   Wt 71.8 kg (158 lb 6.4 oz)   LMP 07/23/2004   SpO2 100%   BMI 30.30 kg/m    Body mass index is 30.3 kg/m .  Physical Exam   GENERAL APPEARANCE: healthy, alert and no distress  EYES: Eyes grossly normal to inspection, PERRL and conjunctivae and sclerae normal  HENT: ear canals and TM's normal and nose and mouth without ulcers or lesions  NECK: no adenopathy  RESP: lungs clear to auscultation - no rales, rhonchi or wheezes  CV: regular rates and rhythm, normal S1 S2, no S3          Signed Electronically by: Merary Geller MD  Scribe Disclosure:   Emy ESCOBAR, am serving as a scribe; to document services personally performed by Merary Geller MD -based on data collection and the provider's statements to me.     "

## 2025-04-21 NOTE — PATIENT INSTRUCTIONS
Continue present management   Covid booster   Follow up in 6 months.  Seek sooner medical attention if there is any worsening of symptoms or problems.

## 2025-05-05 DIAGNOSIS — I10 BENIGN HYPERTENSION: ICD-10-CM

## 2025-05-05 RX ORDER — AMLODIPINE BESYLATE 2.5 MG/1
2.5 TABLET ORAL 2 TIMES DAILY
Qty: 180 TABLET | Refills: 3 | OUTPATIENT
Start: 2025-05-05

## 2025-05-15 ENCOUNTER — OFFICE VISIT (OUTPATIENT)
Dept: FAMILY MEDICINE | Facility: CLINIC | Age: 75
End: 2025-05-15
Payer: COMMERCIAL

## 2025-05-15 ENCOUNTER — RESULTS FOLLOW-UP (OUTPATIENT)
Dept: FAMILY MEDICINE | Facility: CLINIC | Age: 75
End: 2025-05-15

## 2025-05-15 VITALS
RESPIRATION RATE: 16 BRPM | HEIGHT: 61 IN | BODY MASS INDEX: 29.64 KG/M2 | OXYGEN SATURATION: 97 % | WEIGHT: 157 LBS | HEART RATE: 66 BPM | SYSTOLIC BLOOD PRESSURE: 123 MMHG | TEMPERATURE: 97.7 F | DIASTOLIC BLOOD PRESSURE: 83 MMHG

## 2025-05-15 DIAGNOSIS — Z01.810 PRE-OPERATIVE CARDIOVASCULAR EXAMINATION: Primary | ICD-10-CM

## 2025-05-15 DIAGNOSIS — G89.29 CHRONIC PAIN OF LEFT KNEE: ICD-10-CM

## 2025-05-15 DIAGNOSIS — M25.562 CHRONIC PAIN OF LEFT KNEE: ICD-10-CM

## 2025-05-15 DIAGNOSIS — R94.31 ABNORMAL ELECTROCARDIOGRAM: ICD-10-CM

## 2025-05-15 DIAGNOSIS — E06.3 HYPOTHYROIDISM DUE TO HASHIMOTO'S THYROIDITIS: Primary | ICD-10-CM

## 2025-05-15 LAB
ABO + RH BLD: NORMAL
ANION GAP SERPL CALCULATED.3IONS-SCNC: 12 MMOL/L (ref 7–15)
BASOPHILS # BLD AUTO: 0 10E3/UL (ref 0–0.2)
BASOPHILS NFR BLD AUTO: 1 %
BUN SERPL-MCNC: 11.6 MG/DL (ref 8–23)
CALCIUM SERPL-MCNC: 9.8 MG/DL (ref 8.8–10.4)
CHLORIDE SERPL-SCNC: 104 MMOL/L (ref 98–107)
CREAT SERPL-MCNC: 0.97 MG/DL (ref 0.51–0.95)
EGFRCR SERPLBLD CKD-EPI 2021: 62 ML/MIN/1.73M2
EOSINOPHIL # BLD AUTO: 0.2 10E3/UL (ref 0–0.7)
EOSINOPHIL NFR BLD AUTO: 3 %
ERYTHROCYTE [DISTWIDTH] IN BLOOD BY AUTOMATED COUNT: 12.4 % (ref 10–15)
GLUCOSE SERPL-MCNC: 104 MG/DL (ref 70–99)
HCO3 SERPL-SCNC: 25 MMOL/L (ref 22–29)
HCT VFR BLD AUTO: 40.8 % (ref 35–47)
HGB BLD-MCNC: 13.4 G/DL (ref 11.7–15.7)
IMM GRANULOCYTES # BLD: 0 10E3/UL
IMM GRANULOCYTES NFR BLD: 0 %
LYMPHOCYTES # BLD AUTO: 2 10E3/UL (ref 0.8–5.3)
LYMPHOCYTES NFR BLD AUTO: 35 %
MCH RBC QN AUTO: 30.2 PG (ref 26.5–33)
MCHC RBC AUTO-ENTMCNC: 32.8 G/DL (ref 31.5–36.5)
MCV RBC AUTO: 92 FL (ref 78–100)
MONOCYTES # BLD AUTO: 0.3 10E3/UL (ref 0–1.3)
MONOCYTES NFR BLD AUTO: 6 %
NEUTROPHILS # BLD AUTO: 3.2 10E3/UL (ref 1.6–8.3)
NEUTROPHILS NFR BLD AUTO: 56 %
PLATELET # BLD AUTO: 183 10E3/UL (ref 150–450)
POTASSIUM SERPL-SCNC: 4.1 MMOL/L (ref 3.4–5.3)
RBC # BLD AUTO: 4.43 10E6/UL (ref 3.8–5.2)
SODIUM SERPL-SCNC: 141 MMOL/L (ref 135–145)
SPECIMEN EXP DATE BLD: NORMAL
WBC # BLD AUTO: 5.8 10E3/UL (ref 4–11)

## 2025-05-15 ASSESSMENT — PAIN SCALES - GENERAL: PAINLEVEL_OUTOF10: NO PAIN (0)

## 2025-05-15 NOTE — PROGRESS NOTES
Preoperative Evaluation  [facility]  [address] [place] [address], [address]  [facility] [address]  Phone: [phone]  Primary Provider: Merary Geller MD  Pre-op Performing Provider: Stacia Carbajal MD  May 15, 2025           5/12/2025   Surgical Information   What procedure is being done? Removal of scar tissue left knee   Facility or Hospital where procedure/surgery will be performed: [facility] [place]   Who is doing the procedure / surgery? Dr. Mikel Cid   Date of surgery / procedure: 05/28/2025   Time of surgery / procedure: TBD   Where do you plan to recover after surgery? at home with family     Fax number for surgical facility: [phone]    Assessment & Plan     The proposed surgical procedure is considered INTERMEDIATE risk.  Abnormal EKG  Chronic pain of left knee  Pre-operative cardiovascular examination  - CBC with platelets and differential  - Basic metabolic panel  (Ca, Cl, CO2, Creat, Gluc, K, Na, BUN)  - EKG 12-lead complete w/read - Clinics  - ABO and Rh              - No identified additional risk factors other than previously addressed    Antiplatelet or Anticoagulation Medication Instructions   - We reviewed the medication list and the patient is not on an antiplatelet or anticoagulation medications.    Additional Medication Instructions  Take all scheduled medications on the day of surgery    Recommendation  Approval pending further diagnostic evaluation by cardiology.      Follow-up in 3 months      Choco Miller is a 74 year old, presenting for the following:  Pre-Op Exam        HPI: patient presents to internal medicine clinic for pre op cardiac evaluation for upcoming ortho left knee surgery.          5/12/2025   Pre-Op Questionnaire   Have you ever had a heart attack or stroke? No   Have you ever had surgery on your heart or blood vessels, such as a stent placement, a coronary artery bypass, or surgery on an artery in your head, neck, heart, or legs? No   Do  you have chest pain with activity? No   Do you have a history of heart failure? No   Do you currently have a cold, bronchitis or symptoms of other infection? No   Do you have a cough, shortness of breath, or wheezing? No   Do you or anyone in your family have previous history of blood clots? No   Do you or does anyone in your family have a serious bleeding problem such as prolonged bleeding following surgeries or cuts? No   Have you ever had problems with anemia or been told to take iron pills? No   Have you had any abnormal blood loss such as black, tarry or bloody stools, or abnormal vaginal bleeding? No   Have you ever had a blood transfusion? No   Are you willing to have a blood transfusion if it is medically needed before, during, or after your surgery? Yes   Have you or any of your relatives ever had problems with anesthesia? (!) YES    Do you have sleep apnea, excessive snoring or daytime drowsiness? No   Do you have any artifical heart valves or other implanted medical devices like a pacemaker, defibrillator, or continuous glucose monitor? No   Do you have artificial joints? (!) YES   Are you allergic to latex? No     Advance Care Planning    Discussed advance care planning with patient; informed AVS has link to [employer].    Preoperative Review of    reviewed - no record of controlled substances prescribed.        Patient Active Problem List    Diagnosis Date Noted    Overweight (BMI 25.0-29.9) 10/18/2024     Priority: Medium    S/p total knee replacement, bilateral 12/06/2022     Priority: Medium    Dermatochalasis of both upper eyelids 05/17/2021     Priority: Medium     Added automatically from request for surgery [id number]      Ptosis of both upper eyelids 05/17/2021     Priority: Medium     Added automatically from request for surgery [id number]      Hypothyroidism due to Hashimoto's thyroiditis 07/17/2020     Priority: Medium    Hypothyroidism, unspecified type 06/25/2019     Priority: Medium     Anti-TPO antibodies present 04/23/2019     Priority: Medium    Elevated TSH 01/23/2019     Priority: Medium    Non morbid obesity, unspecified obesity type 01/03/2017     Priority: Medium    IFG (impaired fasting glucose) 09/24/2016     Priority: Medium    Thyroid nodule 12/31/2015     Priority: Medium    Generalized anxiety disorder 05/19/2014     Priority: Medium    Bilateral bunions 05/12/2014     Priority: Medium    Right shoulder pain 04/22/2014     Priority: Medium    Osteoarthritis of acromioclavicular joint 04/22/2014     Priority: Medium    Esophageal reflux 04/21/2014     Priority: Medium    Benign hypertension 03/07/2013     Priority: Medium    CKD (chronic kidney disease) stage 3, GFR 30-59 ml/min (H)      Priority: Medium    Hyperlipidemia, unspecified hyperlipidemia type 05/09/2010     Priority: Medium      Past Medical History:   Diagnosis Date    Bilateral bunions 05/12/2014    CKD (chronic kidney disease) stage 3, GFR 30-59 ml/min (H)     Gastro-oesophageal reflux disease     Hyperlipidemia LDL goal <100 05/09/2010    Hypertension goal BP (blood pressure) < 140/90 03/07/2013    IFG (impaired fasting glucose) 09/24/2016    Leiomyoma of large intestine     transverse colon    Osteoarthritis of acromioclavicular joint     PONV (postoperative nausea and vomiting)     Thyroid nodule 12/31/2015     Past Surgical History:   Procedure Laterality Date    BIOPSY  1985    Breast lump    BREAST SURGERY      See above    BUNIONECTOMY  06/05/2014    Procedure: BUNIONECTOMY;  Surgeon: Kevin Rodas DPM;  Location: [facility] OR    C/SECTION, LOW TRANSVERSE      COLONOSCOPY  2008; 2018    COLONOSCOPY N/A 12/13/2023    Procedure: Colonoscopy;  Surgeon: Sierra Owens MD;  Location:  GI    COMBINED REPAIR PTOSIS WITH BLEPHAROPLASTY Bilateral 06/30/2021    Procedure: Bilateral upper eyelid blepharoplasty wtih ptosis repair;  Surgeon: Gregor Harris MD;  Location: [facility] OR    DILATION AND CURETTAGE,  OPERATIVE HYSTEROSCOPY WITH MORCELLATOR, COMBINED N/A 2023    Procedure: HYSTEROSCOPY, WITH DILATION AND CURETTAGE OF UTERUS USING MYOSURE MORCELLATOR;  Surgeon: Puja Mills DO;  Location: [facility] OR    EXCISE MASS BACK  2014    Procedure: EXCISE MASS BACK;  Surgeon: Chaim Lacy MD;  Location: [facility] OR    EYE SURGERY      ?    HC DILATION/CURETTAGE DIAG/THER NON OB      D & C    REPLACEMENT TOTAL KNEE      surgery for ptosis of both eyelids      TONSILLECTOMY      tonsillectomy    ZZC  DELIVERY ONLY      , Low Cervical    ZZC NONSPECIFIC PROCEDURE      lumpectomy, lt    ZZC NONSPECIFIC PROCEDURE      ob     ZZC NONSPECIFIC PROCEDURE  1983    Vaginal delivery x 1    ZZ ARTHROTOMY W/OPEN MENISCUS REPAIR      lt knee     Current Outpatient Medications   Medication Sig Dispense Refill    acetaminophen (TYLENOL) 500 MG tablet Take 2 tablets (1,000 mg) by mouth every 6 hours as needed for pain or fever 90 tablet 0    amLODIPine (NORVASC) 2.5 MG tablet Take 1 tablet (2.5 mg) by mouth 2 times daily. for Blood Pressure 180 tablet 3    Blood Pressure Monitoring KIT 1 kit daily 1 kit 0    cholecalciferol 25 MCG (1000 UT) TABS Take 1,000 Units by mouth      citalopram (CELEXA) 20 MG tablet Take 1 tablet (20 mg) by mouth daily. 90 tablet 3    clindamycin (CLEOCIN) 300 MG capsule Take 300 mg by mouth See Admin Instructions  TAKE 2 CAPSULES BY MOUTH 1 HOUR BEFORE DENTAL APPOINTMENT      Cyanocobalamin (B-12 PO) Take by mouth.      levothyroxine (SYNTHROID/LEVOTHROID) 25 MCG tablet Take 1.5 tablets (37.5 mcg) by mouth daily. for thyroid from endocinologist      losartan (COZAAR) 50 MG tablet Take 1 tablet (50 mg) by mouth 2 times daily. for Blood Pressure 180 tablet 3    NYSTOP 811629 UNIT/GM powder APPLY TOPICALLY 2 TIMES DAILY 60 g 1    omeprazole (PRILOSEC) 20 MG DR capsule Take 1 capsule (20 mg) by mouth daily as needed 90 capsule 3    pravastatin (PRAVACHOL) 20 MG  "tablet Take 1 tablet (20 mg) by mouth daily. for cholestrol 90 tablet 3    scopolamine (TRANSDERM) 1 MG/3DAYS 72 hr patch Apply 1 patch to hairless area behind one ear at least 4 hours before travel.  Remove old patch and change every 3 days (72 hours). 10 patch 3    Selenium 200 MCG TABS tablet Take 200 mcg by mouth daily      vitamin D3 (CHOLECALCIFEROL) 50 mcg (2000 units) tablet Take 1 tablet (50 mcg) by mouth daily         Allergies   Allergen Reactions    Norco [Hydrocodone-Acetaminophen] Dizziness and Nausea and Vomiting    Eggs Nausea and Vomiting and Diarrhea    Guaifenesin Hives    Guaifenesin Er Hives    Lisinopril Cough    Penicillins Rash    Sulfa Antibiotics Hives    Sulfites Nausea and Vomiting and Diarrhea    Sulfametrole Rash        Social History     Tobacco Use    Smoking status: Never    Smokeless tobacco: Never   Substance Use Topics    Alcohol use: Yes     Comment: 1 per week       History   Drug Use Unknown             Review of Systems  Constitutional, HEENT, cardiovascular, pulmonary, GI, , musculoskeletal, neuro, skin, endocrine and psych systems are negative, except as otherwise noted.    Objective    /83   Pulse 66   Temp 97.7  F (36.5  C) (Temporal)   Resp 16   Ht 1.54 m (5' 0.63\")   Wt 71.2 kg (157 lb)   LMP 07/23/2004   SpO2 97%   BMI 30.03 kg/m     Estimated body mass index is 30.3 kg/m  as calculated from the following:    Height as of 4/21/25: 1.54 m (5' 0.63\").    Weight as of 4/21/25: 71.8 kg (158 lb 6.4 oz).  Physical Exam  GENERAL: alert and no distress  EYES: Eyes grossly normal to inspection, conjunctivae and sclerae normal  HENT: normocephalic atraumatic  NECK: no asymmetry  RESP: lungs clear to auscultation - no rales, rhonchi or wheezes  CV: regular rate and rhythm, normal S1 S2  ABDOMEN: soft, nondistended  MS: mechanical left knee pains noted, no edema  SKIN: no suspicious lesions or rashes  NEURO: Normal strength and tone, mentation intact and speech " normal  PSYCH: mentation appears normal, affect normal/bright    Recent Labs   Lab Test 10/18/24  1027   HGB 12.9         POTASSIUM 5.0   CR 0.90        Diagnostics  Recent Results (from the past 24 hours)   ABO and Rh    Collection Time: 05/15/25  7:46 AM   Result Value Ref Range    ABO/RH(D) O POS     SPECIMEN EXPIRATION DATE [id number]    CBC with platelets and differential    Collection Time: 05/15/25  7:46 AM   Result Value Ref Range    WBC Count 5.8 4.0 - 11.0 10e3/uL    RBC Count 4.43 3.80 - 5.20 10e6/uL    Hemoglobin 13.4 11.7 - 15.7 g/dL    Hematocrit 40.8 35.0 - 47.0 %    MCV 92 78 - 100 fL    MCH 30.2 26.5 - 33.0 pg    MCHC 32.8 31.5 - 36.5 g/dL    RDW 12.4 10.0 - 15.0 %    Platelet Count 183 150 - 450 10e3/uL    % Neutrophils 56 %    % Lymphocytes 35 %    % Monocytes 6 %    % Eosinophils 3 %    % Basophils 1 %    % Immature Granulocytes 0 %    Absolute Neutrophils 3.2 1.6 - 8.3 10e3/uL    Absolute Lymphocytes 2.0 0.8 - 5.3 10e3/uL    Absolute Monocytes 0.3 0.0 - 1.3 10e3/uL    Absolute Eosinophils 0.2 0.0 - 0.7 10e3/uL    Absolute Basophils 0.0 0.0 - 0.2 10e3/uL    Absolute Immature Granulocytes 0.0 <=0.4 10e3/uL           The longitudinal plan of care for the diagnosis(es)/condition(s) as documented were addressed during this visit. Due to the added complexity in care, I will continue to support Angela in the subsequent management and with ongoing continuity of care.      Signed Electronically by: Stacia Carbajal MD  A copy of this evaluation report is provided to the requesting physician.

## 2025-05-15 NOTE — TELEPHONE ENCOUNTER
This encounter is being sent to inform the clinic that this patient has a referral from Dr. Stacia Carbajal for the diagnoses of abnormal EKG and has requested that this patient be seen within 3-5 days.  Based on the availability of our provider(s), we are unable to accommodate this request.    Were all sites offered this patient?  Yes  She is willing to go to [facility], [place], or [place].     Patient and Dr. Carbajal called - patient has an abnormal EKG & she is scheduled for knee surgery on May 28th. Dr. Carbajal placed an urgent referral; patient will be out of town starting 5/16 to 5/20, but is available after the 20th. Please call for scheduling - okay to leave a detailed voicemail.     Thank you!  [facility]

## 2025-05-26 RX ORDER — LEVOTHYROXINE SODIUM 25 UG/1
37.5 TABLET ORAL DAILY
Qty: 90 TABLET | Refills: 0 | Status: SHIPPED | OUTPATIENT
Start: 2025-05-26

## 2025-06-01 ENCOUNTER — TRANSFERRED RECORDS (OUTPATIENT)
Dept: HEALTH INFORMATION MANAGEMENT | Facility: CLINIC | Age: 75
End: 2025-06-01
Payer: COMMERCIAL

## 2025-06-12 ENCOUNTER — TRANSFERRED RECORDS (OUTPATIENT)
Dept: HEALTH INFORMATION MANAGEMENT | Facility: CLINIC | Age: 75
End: 2025-06-12
Payer: COMMERCIAL

## (undated) DEVICE — SYR 10ML FINGER CONTROL W/O NDL 309695

## (undated) DEVICE — GLOVE BIOGEL PI ULTRATOUCH SZ 6.5 41165

## (undated) DEVICE — KIT PROCEDURE FLUENT IN/OUT FLOWPAK TISS TRAP FLT-112S

## (undated) DEVICE — PACK TVT HYSTEROSCOPY SMA15HYFSE

## (undated) DEVICE — SOL NACL 0.9% IRRIG 1000ML BOTTLE 2F7124

## (undated) DEVICE — GLOVE PROTEXIS MICRO 7.5  2D73PM75

## (undated) DEVICE — GLOVE BIOGEL PI ULTRATOUCH SZ 7.0 41170

## (undated) DEVICE — GLOVE BIOGEL PI MICRO INDICATOR UNDERGLOVE SZ 7.0 48970

## (undated) DEVICE — ESU EYE HIGH TEMP 65410-183

## (undated) DEVICE — SEAL SET MYOSURE ROD LENS SCOPE SINGLE USE 40-902

## (undated) DEVICE — PACK MINOR EYE CUSTOM ASC

## (undated) DEVICE — LINEN TOWEL PACK X5 5464

## (undated) DEVICE — SOL WATER IRRIG 500ML BOTTLE 2F7113

## (undated) DEVICE — SWAB PROCTO 16" NON-STERILE

## (undated) DEVICE — CATH INTERMITTENT CLEAN-CATH FEMALE 14FR 6" VINYL LF 420614

## (undated) DEVICE — SOL NACL 0.9% IRRIG 3000ML BAG 2B7477

## (undated) DEVICE — EYE PREP BETADINE 5% SOLUTION 30ML 0065-0411-30

## (undated) DEVICE — NDL 25GA 1.5" 305127

## (undated) RX ORDER — FENTANYL CITRATE 50 UG/ML
INJECTION, SOLUTION INTRAMUSCULAR; INTRAVENOUS
Status: DISPENSED
Start: 2023-12-13

## (undated) RX ORDER — DEXAMETHASONE SODIUM PHOSPHATE 4 MG/ML
INJECTION, SOLUTION INTRA-ARTICULAR; INTRALESIONAL; INTRAMUSCULAR; INTRAVENOUS; SOFT TISSUE
Status: DISPENSED
Start: 2023-07-11

## (undated) RX ORDER — ONDANSETRON 2 MG/ML
INJECTION INTRAMUSCULAR; INTRAVENOUS
Status: DISPENSED
Start: 2018-03-27

## (undated) RX ORDER — PROPOFOL 10 MG/ML
INJECTION, EMULSION INTRAVENOUS
Status: DISPENSED
Start: 2023-07-11

## (undated) RX ORDER — FENTANYL CITRATE 50 UG/ML
INJECTION, SOLUTION INTRAMUSCULAR; INTRAVENOUS
Status: DISPENSED
Start: 2018-03-27

## (undated) RX ORDER — ONDANSETRON 2 MG/ML
INJECTION INTRAMUSCULAR; INTRAVENOUS
Status: DISPENSED
Start: 2023-12-13

## (undated) RX ORDER — FENTANYL CITRATE 50 UG/ML
INJECTION, SOLUTION INTRAMUSCULAR; INTRAVENOUS
Status: DISPENSED
Start: 2023-07-11

## (undated) RX ORDER — ACETAMINOPHEN 325 MG/1
TABLET ORAL
Status: DISPENSED
Start: 2021-06-30

## (undated) RX ORDER — CEFAZOLIN SODIUM/WATER 2 G/20 ML
SYRINGE (ML) INTRAVENOUS
Status: DISPENSED
Start: 2023-07-11

## (undated) RX ORDER — ONDANSETRON 2 MG/ML
INJECTION INTRAMUSCULAR; INTRAVENOUS
Status: DISPENSED
Start: 2023-07-11

## (undated) RX ORDER — FENTANYL CITRATE 50 UG/ML
INJECTION, SOLUTION INTRAMUSCULAR; INTRAVENOUS
Status: DISPENSED
Start: 2021-06-30

## (undated) RX ORDER — BUPIVACAINE HYDROCHLORIDE 2.5 MG/ML
INJECTION, SOLUTION EPIDURAL; INFILTRATION; INTRACAUDAL
Status: DISPENSED
Start: 2023-07-11

## (undated) RX ORDER — PROPOFOL 10 MG/ML
INJECTION, EMULSION INTRAVENOUS
Status: DISPENSED
Start: 2021-06-30